# Patient Record
Sex: MALE | Race: WHITE | NOT HISPANIC OR LATINO
[De-identification: names, ages, dates, MRNs, and addresses within clinical notes are randomized per-mention and may not be internally consistent; named-entity substitution may affect disease eponyms.]

---

## 2018-12-27 ENCOUNTER — OTHER (OUTPATIENT)
Age: 68
End: 2018-12-27

## 2018-12-27 PROBLEM — Z00.00 ENCOUNTER FOR PREVENTIVE HEALTH EXAMINATION: Status: ACTIVE | Noted: 2018-12-27

## 2019-01-16 ENCOUNTER — APPOINTMENT (OUTPATIENT)
Dept: PULMONOLOGY | Facility: CLINIC | Age: 69
End: 2019-01-16
Payer: COMMERCIAL

## 2019-01-16 ENCOUNTER — LABORATORY RESULT (OUTPATIENT)
Age: 69
End: 2019-01-16

## 2019-01-16 VITALS
BODY MASS INDEX: 19.32 KG/M2 | HEIGHT: 71 IN | WEIGHT: 138 LBS | DIASTOLIC BLOOD PRESSURE: 90 MMHG | SYSTOLIC BLOOD PRESSURE: 140 MMHG | OXYGEN SATURATION: 96 % | HEART RATE: 94 BPM

## 2019-01-16 PROCEDURE — 99214 OFFICE O/P EST MOD 30 MIN: CPT | Mod: 25

## 2019-01-16 PROCEDURE — 94250: CPT

## 2019-01-16 PROCEDURE — 36415 COLL VENOUS BLD VENIPUNCTURE: CPT

## 2019-01-16 PROCEDURE — 88738 HGB QUANT TRANSCUTANEOUS: CPT

## 2019-01-16 PROCEDURE — 95012 NITRIC OXIDE EXP GAS DETER: CPT

## 2019-01-16 PROCEDURE — 94060 EVALUATION OF WHEEZING: CPT

## 2019-01-16 PROCEDURE — 99407 BEHAV CHNG SMOKING > 10 MIN: CPT | Mod: 25

## 2019-01-16 PROCEDURE — 94729 DIFFUSING CAPACITY: CPT

## 2019-01-16 PROCEDURE — 94727 GAS DIL/WSHOT DETER LNG VOL: CPT

## 2019-01-16 NOTE — PROCEDURE
[FreeTextEntry1] : Chest CT scan performed on January 7, 2019 showed a 2 cm x 1.5 cm x 1.1 cm irregular spiculated mass anteriorly within the left lower lobe. This is considered suspicious for neoplasia. Further evaluation with PET/CT scan is recommended. Severe emphysematous changes most extensive within the upper lung fields. Bullous changes at the lung apices. Atherosclerotic calcification within the thoracic aorta and coronary arteries.\par \par Pulmonary function test performed in my office today: Lung volume was within normal limits; spirometry showed severe obstructive airway disease with some improvement postbronchodilator; diffusion showed very severe impairment. SpO2 at rest on room air is 96%.\par \par Monoxide level was found to be elevated at 20 PPM.\par \par Exhaled nitric oxide level is 7 ppb.

## 2019-01-16 NOTE — PHYSICAL EXAM
[General Appearance - Well Developed] : well developed [Normal Appearance] : normal appearance [Well Groomed] : well groomed [General Appearance - Well Nourished] : well nourished [No Deformities] : no deformities [General Appearance - In No Acute Distress] : no acute distress [Heart Rate And Rhythm] : heart rate and rhythm were normal [Heart Sounds] : normal S1 and S2 [Murmurs] : no murmurs present [Respiration, Rhythm And Depth] : normal respiratory rhythm and effort [Exaggerated Use Of Accessory Muscles For Inspiration] : no accessory muscle use [Abdomen Soft] : soft [Abdomen Tenderness] : non-tender [Abdomen Mass (___ Cm)] : no abdominal mass palpated [Nail Clubbing] : no clubbing of the fingernails [Cyanosis, Localized] : no localized cyanosis [Petechial Hemorrhages (___cm)] : no petechial hemorrhages [] : no ischemic changes [FreeTextEntry1] : Decreased breath sounds bilaterally

## 2019-01-16 NOTE — DISCUSSION/SUMMARY
[FreeTextEntry1] : Chilo is a patient with a newly discovered left lower lobe spiculated lung nodule, concerning for lung cancer in this active smoker. Secondly, he is a patient with COPD from history of cigarette smoking. Thirdly, his vision when atherosclerotic calcifications in the coronary arteries, rule out CAD

## 2019-01-16 NOTE — REASON FOR VISIT
[Follow-Up] : a follow-up visit [Abnormal CT Scan] : abnormal CT Scan [COPD] : COPD [Shortness of Breath] : shortness of Breath

## 2019-01-16 NOTE — ASSESSMENT
[FreeTextEntry1] : 15 minutes were spent in smoking cessation counseling.  I advised Chilo to continue to use Trelegy for history of COPD. I discussed with him at length regarding the aforementioned chest CT scan findings, I will arrange for PET/CT scan for further evaluation. I collected serum Quantiferon, ACE, etc. for further evaluation. I advised him to seek cardiology evaluation for atherosclerotic changes in coronary arteries. I advised to return to my office for follow visit after the PET scan has been performed

## 2019-01-19 LAB
ACE BLD-CCNC: 62 U/L
ANA SER IF-ACNC: NEGATIVE
APTT BLD: 33.8 SEC
BASOPHILS # BLD AUTO: 0.03 K/UL
BASOPHILS NFR BLD AUTO: 0.4 %
CK SERPL-CCNC: 95 U/L
CRP SERPL-MCNC: 0.13 MG/DL
ENA JO1 AB SER IA-ACNC: <0.2 AL
ENA SCL70 IGG SER IA-ACNC: <0.2 AL
EOSINOPHIL # BLD AUTO: 0.11 K/UL
EOSINOPHIL NFR BLD AUTO: 1.5 %
ERYTHROCYTE [SEDIMENTATION RATE] IN BLOOD BY WESTERGREN METHOD: 27 MM/HR
HCT VFR BLD CALC: 42.9 %
HGB BLD-MCNC: 13.9 G/DL
HIV1+2 AB SPEC QL IA.RAPID: NONREACTIVE
IMM GRANULOCYTES NFR BLD AUTO: 0.1 %
INR PPP: 1.28 RATIO
LYMPHOCYTES # BLD AUTO: 1.96 K/UL
LYMPHOCYTES NFR BLD AUTO: 27.6 %
MAN DIFF?: NORMAL
MCHC RBC-ENTMCNC: 31.2 PG
MCHC RBC-ENTMCNC: 32.4 GM/DL
MCV RBC AUTO: 96.2 FL
MONOCYTES # BLD AUTO: 0.63 K/UL
MONOCYTES NFR BLD AUTO: 8.9 %
MPO AB + PR3 PNL SER: NORMAL
NEUTROPHILS # BLD AUTO: 4.37 K/UL
NEUTROPHILS NFR BLD AUTO: 61.5 %
PLATELET # BLD AUTO: 302 K/UL
PT BLD: 14.5 SEC
RBC # BLD: 4.46 M/UL
RBC # FLD: 13.3 %
RHEUMATOID FACT SER QL: 11 IU/ML
WBC # FLD AUTO: 7.11 K/UL

## 2019-01-21 LAB
M TB IFN-G BLD-IMP: NEGATIVE
QUANTIFERON TB PLUS MITOGEN MINUS NIL: 7.77 IU/ML
QUANTIFERON TB PLUS NIL: 0.02 IU/ML
QUANTIFERON TB PLUS TB1 MINUS NIL: 0 IU/ML
QUANTIFERON TB PLUS TB2 MINUS NIL: 0 IU/ML

## 2019-01-27 LAB — HP PNL SER: NORMAL

## 2019-01-30 ENCOUNTER — APPOINTMENT (OUTPATIENT)
Dept: PULMONOLOGY | Facility: CLINIC | Age: 69
End: 2019-01-30

## 2019-07-09 ENCOUNTER — APPOINTMENT (OUTPATIENT)
Dept: OTHER | Facility: CLINIC | Age: 69
End: 2019-07-09
Payer: COMMERCIAL

## 2019-07-09 VITALS
HEART RATE: 72 BPM | SYSTOLIC BLOOD PRESSURE: 143 MMHG | BODY MASS INDEX: 20.02 KG/M2 | HEIGHT: 71 IN | DIASTOLIC BLOOD PRESSURE: 91 MMHG | WEIGHT: 143 LBS | OXYGEN SATURATION: 97 % | RESPIRATION RATE: 18 BRPM

## 2019-07-09 DIAGNOSIS — Z03.89 ENCOUNTER FOR OBSERVATION FOR OTHER SUSPECTED DISEASES AND CONDITIONS RULED OUT: ICD-10-CM

## 2019-07-09 DIAGNOSIS — F17.200 NICOTINE DEPENDENCE, UNSPECIFIED, UNCOMPLICATED: ICD-10-CM

## 2019-07-09 PROCEDURE — 99386 PREV VISIT NEW AGE 40-64: CPT | Mod: 25

## 2019-07-09 PROCEDURE — 94060 EVALUATION OF WHEEZING: CPT

## 2019-07-10 LAB
ALBUMIN SERPL ELPH-MCNC: 4.2 G/DL
ALP BLD-CCNC: 86 U/L
ALT SERPL-CCNC: 8 U/L
ANION GAP SERPL CALC-SCNC: 10 MMOL/L
APPEARANCE: CLEAR
AST SERPL-CCNC: 15 U/L
BACTERIA: NEGATIVE
BASOPHILS # BLD AUTO: 0.05 K/UL
BASOPHILS NFR BLD AUTO: 0.7 %
BILIRUB SERPL-MCNC: 0.5 MG/DL
BILIRUBIN URINE: NEGATIVE
BLOOD URINE: NEGATIVE
BUN SERPL-MCNC: 13 MG/DL
CALCIUM SERPL-MCNC: 9.7 MG/DL
CHLORIDE SERPL-SCNC: 102 MMOL/L
CHOLEST SERPL-MCNC: 184 MG/DL
CHOLEST/HDLC SERPL: 4.8 RATIO
CO2 SERPL-SCNC: 25 MMOL/L
COLOR: YELLOW
CREAT SERPL-MCNC: 0.99 MG/DL
EOSINOPHIL # BLD AUTO: 0.06 K/UL
EOSINOPHIL NFR BLD AUTO: 0.8 %
GLUCOSE QUALITATIVE U: NEGATIVE
GLUCOSE SERPL-MCNC: 97 MG/DL
HCT VFR BLD CALC: 48.3 %
HDLC SERPL-MCNC: 38 MG/DL
HGB BLD-MCNC: 15 G/DL
HYALINE CASTS: 0 /LPF
IMM GRANULOCYTES NFR BLD AUTO: 0.7 %
KETONES URINE: NEGATIVE
LDLC SERPL CALC-MCNC: 124 MG/DL
LEUKOCYTE ESTERASE URINE: NEGATIVE
LYMPHOCYTES # BLD AUTO: 1.79 K/UL
LYMPHOCYTES NFR BLD AUTO: 24.7 %
MAN DIFF?: NORMAL
MCHC RBC-ENTMCNC: 30.1 PG
MCHC RBC-ENTMCNC: 31.1 GM/DL
MCV RBC AUTO: 96.8 FL
MICROSCOPIC-UA: NORMAL
MONOCYTES # BLD AUTO: 0.75 K/UL
MONOCYTES NFR BLD AUTO: 10.3 %
NEUTROPHILS # BLD AUTO: 4.56 K/UL
NEUTROPHILS NFR BLD AUTO: 62.8 %
NITRITE URINE: NEGATIVE
PH URINE: 7.5
PLATELET # BLD AUTO: 249 K/UL
POTASSIUM SERPL-SCNC: 5.7 MMOL/L
PROT SERPL-MCNC: 7 G/DL
PROTEIN URINE: NEGATIVE
RBC # BLD: 4.99 M/UL
RBC # FLD: 13.1 %
RED BLOOD CELLS URINE: 3 /HPF
SODIUM SERPL-SCNC: 137 MMOL/L
SPECIFIC GRAVITY URINE: 1.01
SQUAMOUS EPITHELIAL CELLS: 0 /HPF
TRIGL SERPL-MCNC: 108 MG/DL
UROBILINOGEN URINE: NORMAL
WBC # FLD AUTO: 7.26 K/UL
WHITE BLOOD CELLS URINE: 0 /HPF

## 2019-09-19 ENCOUNTER — APPOINTMENT (OUTPATIENT)
Dept: PULMONOLOGY | Facility: CLINIC | Age: 69
End: 2019-09-19

## 2019-09-24 ENCOUNTER — APPOINTMENT (OUTPATIENT)
Dept: PULMONOLOGY | Facility: CLINIC | Age: 69
End: 2019-09-24

## 2019-10-08 ENCOUNTER — APPOINTMENT (OUTPATIENT)
Dept: DERMATOLOGY | Facility: CLINIC | Age: 69
End: 2019-10-08

## 2019-12-13 ENCOUNTER — APPOINTMENT (OUTPATIENT)
Dept: GASTROENTEROLOGY | Facility: CLINIC | Age: 69
End: 2019-12-13

## 2020-02-21 ENCOUNTER — APPOINTMENT (OUTPATIENT)
Dept: PULMONOLOGY | Facility: CLINIC | Age: 70
End: 2020-02-21
Payer: COMMERCIAL

## 2020-02-21 ENCOUNTER — MED ADMIN CHARGE (OUTPATIENT)
Age: 70
End: 2020-02-21

## 2020-02-21 VITALS — BODY MASS INDEX: 19.61 KG/M2 | OXYGEN SATURATION: 96 % | HEIGHT: 70 IN | WEIGHT: 137 LBS | HEART RATE: 74 BPM

## 2020-02-21 VITALS
BODY MASS INDEX: 19.61 KG/M2 | HEART RATE: 66 BPM | WEIGHT: 137 LBS | RESPIRATION RATE: 15 BRPM | SYSTOLIC BLOOD PRESSURE: 150 MMHG | HEIGHT: 70 IN | TEMPERATURE: 97.9 F | DIASTOLIC BLOOD PRESSURE: 100 MMHG

## 2020-02-21 DIAGNOSIS — R06.2 WHEEZING: ICD-10-CM

## 2020-02-21 PROCEDURE — 90732 PPSV23 VACC 2 YRS+ SUBQ/IM: CPT

## 2020-02-21 PROCEDURE — 99204 OFFICE O/P NEW MOD 45 MIN: CPT | Mod: 25

## 2020-02-21 PROCEDURE — 94060 EVALUATION OF WHEEZING: CPT

## 2020-02-21 PROCEDURE — 94726 PLETHYSMOGRAPHY LUNG VOLUMES: CPT

## 2020-02-21 PROCEDURE — 94729 DIFFUSING CAPACITY: CPT

## 2020-02-21 PROCEDURE — ZZZZZ: CPT

## 2020-02-21 PROCEDURE — G0009: CPT

## 2020-02-21 PROCEDURE — G0296 VISIT TO DETERM LDCT ELIG: CPT

## 2020-02-21 NOTE — PHYSICAL EXAM
[Normal Oropharynx] : normal oropharynx [No Acute Distress] : no acute distress [Normal Appearance] : normal appearance [No Neck Mass] : no neck mass [Normal Rate/Rhythm] : normal rate/rhythm [No Murmurs] : no murmurs [No Resp Distress] : no resp distress [Normal S1, S2] : normal s1, s2 [Clear to Auscultation Bilaterally] : clear to auscultation bilaterally [Benign] : benign [No Abnormalities] : no abnormalities [Normal Gait] : normal gait [No Clubbing] : no clubbing [No Cyanosis] : no cyanosis [No Edema] : no edema [FROM] : FROM [Normal Color/ Pigmentation] : normal color/ pigmentation [No Focal Deficits] : no focal deficits [Oriented x3] : oriented x3 [Normal Affect] : normal affect

## 2020-02-27 NOTE — END OF VISIT
[FreeTextEntry3] : I directly supervised nurse practitioner Popeye Shafer and was present during key points of his history and physical. I agree with his history, physical and assessment.\par

## 2020-02-27 NOTE — ASSESSMENT
[FreeTextEntry1] : 1. COPD: Severe obstruction based upon PFT. Will start patient on Trelegy as maintenance medication for COPD. Patient educated and demonstration provided on administration. return to office in 6 months.\par \par 2. Lung mass: s/p resection, according to patient benign. Will repeat CT as it has been 1 year since surgery without further imaging. Will initiate lung cancer screening after discussion of risks/benefits with patient.\par \par 3. Former smoker: No longer smoking. Advised to contact office if any difficulty in maintaining nonsmoker status.\par \par 4. Health Maintenance: Discussed flu and pneumonia vaccinations with patient, patient has never had either. However, he agrees to have PNA vaccination today.

## 2020-02-27 NOTE — HISTORY OF PRESENT ILLNESS
[>= 30 pack years] : >= 30 pack years [Former] : former [Never] : never [TextBox_4] : 68yo M with PMH COPD/emphysema (dx soon after 9/11), lung mass (benign, s/p resection in 2/2019). \par \par Dyspnea, cough, wheeze started around 2008. Dyspnea on exertion after 1/2 block walk or going up stairs. Denies childhood asthma. Smoked about 45 years, 1.5 PPD maximum. Quit in February of 2019, using Chantix. Denies vaping. 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 [TextBox_11] : 1.5 [TextBox_13] : 47 [TextBox_15] : 2019

## 2020-03-11 ENCOUNTER — APPOINTMENT (OUTPATIENT)
Dept: DERMATOLOGY | Facility: CLINIC | Age: 70
End: 2020-03-11
Payer: COMMERCIAL

## 2020-03-11 ENCOUNTER — LABORATORY RESULT (OUTPATIENT)
Age: 70
End: 2020-03-11

## 2020-03-11 VITALS — HEIGHT: 70 IN | BODY MASS INDEX: 20.04 KG/M2 | WEIGHT: 140 LBS

## 2020-03-11 DIAGNOSIS — L57.0 ACTINIC KERATOSIS: ICD-10-CM

## 2020-03-11 DIAGNOSIS — D22.9 MELANOCYTIC NEVI, UNSPECIFIED: ICD-10-CM

## 2020-03-11 DIAGNOSIS — Z12.83 ENCOUNTER FOR SCREENING FOR MALIGNANT NEOPLASM OF SKIN: ICD-10-CM

## 2020-03-11 PROCEDURE — 17004 DESTROY PREMAL LESIONS 15/>: CPT | Mod: 59

## 2020-03-11 PROCEDURE — 99203 OFFICE O/P NEW LOW 30 MIN: CPT | Mod: 25

## 2020-03-11 PROCEDURE — 11102 TANGNTL BX SKIN SINGLE LES: CPT | Mod: 59

## 2020-04-24 ENCOUNTER — APPOINTMENT (OUTPATIENT)
Dept: GASTROENTEROLOGY | Facility: CLINIC | Age: 70
End: 2020-04-24

## 2020-05-13 VITALS — BODY MASS INDEX: 20.04 KG/M2 | HEIGHT: 70 IN | WEIGHT: 140 LBS

## 2020-05-13 DIAGNOSIS — Z12.2 ENCOUNTER FOR SCREENING FOR MALIGNANT NEOPLASM OF RESPIRATORY ORGANS: ICD-10-CM

## 2020-05-13 NOTE — REASON FOR VISIT
[Review of Eligibility] : review of eligibility [Virtual Visit] : virtual visit [Initial Evaluation] : an initial evaluation visit

## 2020-05-16 ENCOUNTER — OUTPATIENT (OUTPATIENT)
Dept: OUTPATIENT SERVICES | Facility: HOSPITAL | Age: 70
LOS: 1 days | End: 2020-05-16

## 2020-05-16 DIAGNOSIS — R91.1 SOLITARY PULMONARY NODULE: ICD-10-CM

## 2020-06-09 ENCOUNTER — APPOINTMENT (OUTPATIENT)
Dept: OTHER | Facility: CLINIC | Age: 70
End: 2020-06-09
Payer: COMMERCIAL

## 2020-06-09 ENCOUNTER — FORM ENCOUNTER (OUTPATIENT)
Age: 70
End: 2020-06-09

## 2020-06-09 PROCEDURE — 99442: CPT | Mod: 95

## 2020-06-09 PROCEDURE — 99396 PREV VISIT EST AGE 40-64: CPT | Mod: 95

## 2020-06-09 RX ORDER — FLUTICASONE FUROATE, UMECLIDINIUM BROMIDE AND VILANTEROL TRIFENATATE 100; 62.5; 25 UG/1; UG/1; UG/1
100-62.5-25 POWDER RESPIRATORY (INHALATION) DAILY
Qty: 3 | Refills: 2 | Status: COMPLETED | COMMUNITY
Start: 2019-01-16 | End: 2020-06-09

## 2020-08-12 NOTE — PLAN
[Age appropriate screenings] : Age appropriate screenings [Regular Exercise] : regular exercise [Regular follow-up with healthcare provider] : regular follow-up with healthcare provider [FreeTextEntry1] : His LDCT is scheduled for 8/15/20 at the [facility] location.  His eligibility is confirmed.

## 2020-08-12 NOTE — HISTORY OF PRESENT ILLNESS
[Former] : former smoker [_____ pack-years] : [unfilled] pack-years [TextBox_13] : He denies hemoptysis, denies new cough, denies unexplained weight loss.\par He is a former smoker with a 102 pack year smoking history (2PPD x 51 years), he quit 1.5 years ago after having lung surgery for what turned out to be a benign nodule.  He has no family h/o lung cancer.  Worked at the [employer] on [employer] for approx 4.5 months. He is referred by Dr. Suleman Pritchard. \par

## 2020-08-15 ENCOUNTER — APPOINTMENT (OUTPATIENT)
Dept: CT IMAGING | Facility: IMAGING CENTER | Age: 70
End: 2020-08-15
Payer: COMMERCIAL

## 2020-08-15 ENCOUNTER — OUTPATIENT (OUTPATIENT)
Dept: OUTPATIENT SERVICES | Facility: HOSPITAL | Age: 70
LOS: 1 days | End: 2020-08-15
Payer: COMMERCIAL

## 2020-08-15 DIAGNOSIS — R91.1 SOLITARY PULMONARY NODULE: ICD-10-CM

## 2020-08-15 DIAGNOSIS — Z00.8 ENCOUNTER FOR OTHER GENERAL EXAMINATION: ICD-10-CM

## 2020-08-15 PROCEDURE — G0297: CPT

## 2020-08-15 PROCEDURE — G0297: CPT | Mod: 26

## 2020-08-18 ENCOUNTER — APPOINTMENT (OUTPATIENT)
Dept: PULMONOLOGY | Facility: CLINIC | Age: 70
End: 2020-08-18
Payer: COMMERCIAL

## 2020-08-18 VITALS
SYSTOLIC BLOOD PRESSURE: 154 MMHG | WEIGHT: 140 LBS | DIASTOLIC BLOOD PRESSURE: 107 MMHG | HEIGHT: 70 IN | BODY MASS INDEX: 20.04 KG/M2 | TEMPERATURE: 97.8 F | HEART RATE: 88 BPM | RESPIRATION RATE: 17 BRPM | OXYGEN SATURATION: 96 %

## 2020-08-18 PROCEDURE — 99214 OFFICE O/P EST MOD 30 MIN: CPT

## 2020-08-18 NOTE — PHYSICAL EXAM
[No Acute Distress] : no acute distress [Normal Oropharynx] : normal oropharynx [Normal Appearance] : normal appearance [No Neck Mass] : no neck mass [Normal Rate/Rhythm] : normal rate/rhythm [Normal S1, S2] : normal s1, s2 [No Resp Distress] : no resp distress [No Murmurs] : no murmurs [Clear to Auscultation Bilaterally] : clear to auscultation bilaterally [No Abnormalities] : no abnormalities [Normal Gait] : normal gait [Benign] : benign [No Cyanosis] : no cyanosis [No Clubbing] : no clubbing [No Edema] : no edema [FROM] : FROM [Normal Color/ Pigmentation] : normal color/ pigmentation [No Focal Deficits] : no focal deficits [Oriented x3] : oriented x3 [Normal Affect] : normal affect

## 2020-08-18 NOTE — HISTORY OF PRESENT ILLNESS
[Former] : former [>= 30 pack years] : >= 30 pack years [Never] : never [TextBox_4] : 68yo M with PMH COPD/emphysema (dx soon after 9/11), lung mass (benign, s/p resection in 2/2019). \par \par ~~~~~\par CT Chest 8/15/2020 IMPRESSION: Right upper lobe 3.7 x 3.8 cm mass with interval increase in size since January 7, 2019 worrisome for neoplasm possibly a primary lung neoplasm. \par \par Multiple bilateral smaller nodular opacities are new since January 7, 2019 may represent metastatic disease. \par \par Enlarged anterior mediastinal lymph node worrisome for metastatic disease. \par \par Lung-RADS 4X. \par ~~~~~\par \par Dyspnea, cough, wheeze started around 2008. Dyspnea on exertion after 1/2 block walk or going up stairs. Denies childhood asthma. Smoked about 45 years, 1.5 PPD maximum. Quit in February of 2019, using Chantix. Denies vaping. 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 Continues to work as a .\par \par Today presents with stable symptoms. Uses Trelegy sporadically, feels it helps when he uses it. Still not smoking. No worsening of symptoms, denies recent weight loss and has in fact gained 2 pounds since 1/2019. [TextBox_11] : 1.5 [TextBox_13] : 45

## 2020-08-18 NOTE — REASON FOR VISIT
[Follow-Up] : a follow-up visit [Abnormal CXR/ Chest CT] : an abnormal CXR/ chest CT [COPD] : COPD [Pulmonary Nodules] : pulmonary nodules

## 2020-08-18 NOTE — ASSESSMENT
[FreeTextEntry1] : 1. Multiple pulmonary nodules: Will arrange for PET/CT ASAP, referral to Dr. Bárbara Valera for FNA following PET results. F/u in office in 2 weeks to discuss results and next steps.\par \par 2. COPD: Continue Trelegy, encouraged daily use.\par \par I, Popeye Shafer NP, am scribing for and in the presence of Dr. Miguel A Pritchard, the following sections HISTORY OF PRESENT ILLNESS, PAST MEDICAL/FAMILY/SOCIAL HISTORY; REVIEW OF SYSTEMS; VITAL SIGNS; PHYSICAL EXAM; DISPOSITION.

## 2020-08-19 ENCOUNTER — FORM ENCOUNTER (OUTPATIENT)
Age: 70
End: 2020-08-19

## 2020-08-21 ENCOUNTER — APPOINTMENT (OUTPATIENT)
Dept: GASTROENTEROLOGY | Facility: CLINIC | Age: 70
End: 2020-08-21

## 2020-08-24 ENCOUNTER — APPOINTMENT (OUTPATIENT)
Dept: PULMONOLOGY | Facility: CLINIC | Age: 70
End: 2020-08-24

## 2020-08-25 ENCOUNTER — RESULT REVIEW (OUTPATIENT)
Age: 70
End: 2020-08-25

## 2020-08-25 ENCOUNTER — APPOINTMENT (OUTPATIENT)
Dept: NUCLEAR MEDICINE | Facility: IMAGING CENTER | Age: 70
End: 2020-08-25

## 2020-08-25 ENCOUNTER — OUTPATIENT (OUTPATIENT)
Dept: OUTPATIENT SERVICES | Facility: HOSPITAL | Age: 70
LOS: 1 days | End: 2020-08-25
Payer: COMMERCIAL

## 2020-08-25 DIAGNOSIS — R91.1 SOLITARY PULMONARY NODULE: ICD-10-CM

## 2020-08-25 PROCEDURE — A9552: CPT

## 2020-08-25 PROCEDURE — 78815 PET IMAGE W/CT SKULL-THIGH: CPT | Mod: 26,PI

## 2020-08-25 PROCEDURE — 78815 PET IMAGE W/CT SKULL-THIGH: CPT

## 2020-09-02 ENCOUNTER — APPOINTMENT (OUTPATIENT)
Dept: PULMONOLOGY | Facility: CLINIC | Age: 70
End: 2020-09-02
Payer: COMMERCIAL

## 2020-09-02 VITALS
SYSTOLIC BLOOD PRESSURE: 160 MMHG | OXYGEN SATURATION: 98 % | TEMPERATURE: 97.8 F | HEART RATE: 82 BPM | WEIGHT: 138 LBS | BODY MASS INDEX: 19.76 KG/M2 | HEIGHT: 70 IN | DIASTOLIC BLOOD PRESSURE: 91 MMHG | RESPIRATION RATE: 16 BRPM

## 2020-09-02 PROCEDURE — 99214 OFFICE O/P EST MOD 30 MIN: CPT | Mod: 25

## 2020-09-02 PROCEDURE — 36415 COLL VENOUS BLD VENIPUNCTURE: CPT

## 2020-09-02 NOTE — HISTORY OF PRESENT ILLNESS
[Former] : former [>= 30 pack years] : >= 30 pack years [Never] : never [TextBox_4] : 70yo M with PMH COPD/emphysema (dx soon after 9/11), lung mass (benign, s/p resection in 2/2019). \par \par ~~~~~\par PET/CT 8/25/2020 IMPRESSION: \par Since outside PET/CT 1/30/2019: \par 1. Considerable interval increased size and FDG avidity of a now spiculated right upper lobe mass with central necrosis suspicious for primary lung malignancy. \par 2. New FDG avid enlarged mediastinal lymph nodes and bilateral pulmonary nodules probably metastatic disease. \par 3. Indeterminate FDG avid left intraparotid focus, not well evaluated secondary to misregistration artifact, however appears new since prior PET/CT, possibly metastatic. Consider further evaluation with ultrasound.\par ~~~~~\par \par Patient with biopsy pending with Dr. Barraza at [facility]. He is still doing well from a symptomatic standpoint. Denies fevers, chills, dyspnea, chest pain/tightness, cough, wheeze. Continues to work on a fulltime basis as a . [TextBox_11] : 1.5 [TextBox_13] : 47

## 2020-09-02 NOTE — ASSESSMENT
[FreeTextEntry1] : 1. Multiple pulmonary nodules: Will f/u and ensure biopsy scheduled asap. Referred to Dr. Ceja from oncology.\par \par 2. COPD: Continue Trelegy, daily use recommended.\par \par 3. HTN: Will start on Losartan. Check CMP for kidney function, potassium level.\par \par 4. Health Maintenance: Recommended influenza vaccine. Patient has never had and declines at this time.\par \par I, Popeye Shafer NP, am scribing for and in the presence of Dr. Miguel A Pritchard, the following sections HISTORY OF PRESENT ILLNESS, PAST MEDICAL/FAMILY/SOCIAL HISTORY; REVIEW OF SYSTEMS; VITAL SIGNS; PHYSICAL EXAM; DISPOSITION.

## 2020-09-02 NOTE — PHYSICAL EXAM
[No Acute Distress] : no acute distress [Normal Oropharynx] : normal oropharynx [Normal Appearance] : normal appearance [No Neck Mass] : no neck mass [Normal Rate/Rhythm] : normal rate/rhythm [Normal S1, S2] : normal s1, s2 [No Murmurs] : no murmurs [No Resp Distress] : no resp distress [Clear to Auscultation Bilaterally] : clear to auscultation bilaterally [No Abnormalities] : no abnormalities [Benign] : benign [Normal Gait] : normal gait [No Clubbing] : no clubbing [No Cyanosis] : no cyanosis [No Edema] : no edema [FROM] : FROM [No Focal Deficits] : no focal deficits [Normal Color/ Pigmentation] : normal color/ pigmentation [Oriented x3] : oriented x3 [Normal Affect] : normal affect

## 2020-09-03 LAB
BASOPHILS # BLD AUTO: 0.06 K/UL
BASOPHILS NFR BLD AUTO: 0.9 %
EOSINOPHIL # BLD AUTO: 0.16 K/UL
EOSINOPHIL NFR BLD AUTO: 2.5 %
HCT VFR BLD CALC: 48.8 %
HGB BLD-MCNC: 14.6 G/DL
IMM GRANULOCYTES NFR BLD AUTO: 0.2 %
LYMPHOCYTES # BLD AUTO: 1.46 K/UL
LYMPHOCYTES NFR BLD AUTO: 23 %
MAN DIFF?: NORMAL
MCHC RBC-ENTMCNC: 29.9 GM/DL
MCHC RBC-ENTMCNC: 30.6 PG
MCV RBC AUTO: 102.3 FL
MONOCYTES # BLD AUTO: 0.48 K/UL
MONOCYTES NFR BLD AUTO: 7.6 %
NEUTROPHILS # BLD AUTO: 4.18 K/UL
NEUTROPHILS NFR BLD AUTO: 65.8 %
PLATELET # BLD AUTO: 271 K/UL
RBC # BLD: 4.77 M/UL
RBC # FLD: 14.2 %
WBC # FLD AUTO: 6.35 K/UL

## 2020-09-04 LAB
ALBUMIN SERPL ELPH-MCNC: 4.2 G/DL
ALP BLD-CCNC: 82 U/L
ALT SERPL-CCNC: 7 U/L
ANION GAP SERPL CALC-SCNC: 11 MMOL/L
AST SERPL-CCNC: 17 U/L
BILIRUB SERPL-MCNC: 0.4 MG/DL
BUN SERPL-MCNC: 14 MG/DL
CALCIUM SERPL-MCNC: 9.7 MG/DL
CHLORIDE SERPL-SCNC: 104 MMOL/L
CO2 SERPL-SCNC: 24 MMOL/L
CREAT SERPL-MCNC: 0.96 MG/DL
GLUCOSE SERPL-MCNC: 90 MG/DL
INR PPP: 1.11 RATIO
POTASSIUM SERPL-SCNC: 5.2 MMOL/L
PROT SERPL-MCNC: 7 G/DL
PT BLD: 13.1 SEC
SODIUM SERPL-SCNC: 139 MMOL/L

## 2020-09-21 ENCOUNTER — OUTPATIENT (OUTPATIENT)
Dept: OUTPATIENT SERVICES | Facility: HOSPITAL | Age: 70
LOS: 1 days | End: 2020-09-21
Payer: COMMERCIAL

## 2020-09-21 DIAGNOSIS — Z11.59 ENCOUNTER FOR SCREENING FOR OTHER VIRAL DISEASES: ICD-10-CM

## 2020-09-21 LAB — SARS-COV-2 RNA SPEC QL NAA+PROBE: SIGNIFICANT CHANGE UP

## 2020-09-21 PROCEDURE — U0003: CPT

## 2020-09-24 ENCOUNTER — RESULT REVIEW (OUTPATIENT)
Age: 70
End: 2020-09-24

## 2020-09-24 ENCOUNTER — APPOINTMENT (OUTPATIENT)
Dept: CT IMAGING | Facility: HOSPITAL | Age: 70
End: 2020-09-24

## 2020-09-24 ENCOUNTER — OUTPATIENT (OUTPATIENT)
Dept: OUTPATIENT SERVICES | Facility: HOSPITAL | Age: 70
LOS: 1 days | End: 2020-09-24
Payer: COMMERCIAL

## 2020-09-24 DIAGNOSIS — R91.1 SOLITARY PULMONARY NODULE: ICD-10-CM

## 2020-09-24 PROCEDURE — 88305 TISSUE EXAM BY PATHOLOGIST: CPT | Mod: 26

## 2020-09-24 PROCEDURE — 88172 CYTP DX EVAL FNA 1ST EA SITE: CPT

## 2020-09-24 PROCEDURE — 88173 CYTOPATH EVAL FNA REPORT: CPT

## 2020-09-24 PROCEDURE — 77012 CT SCAN FOR NEEDLE BIOPSY: CPT | Mod: 26

## 2020-09-24 PROCEDURE — 32405: CPT

## 2020-09-24 PROCEDURE — 88173 CYTOPATH EVAL FNA REPORT: CPT | Mod: 26

## 2020-09-24 PROCEDURE — 77012 CT SCAN FOR NEEDLE BIOPSY: CPT

## 2020-09-24 PROCEDURE — 88305 TISSUE EXAM BY PATHOLOGIST: CPT

## 2020-09-30 ENCOUNTER — FORM ENCOUNTER (OUTPATIENT)
Age: 70
End: 2020-09-30

## 2020-10-03 DIAGNOSIS — Z01.818 ENCOUNTER FOR OTHER PREPROCEDURAL EXAMINATION: ICD-10-CM

## 2020-10-04 ENCOUNTER — APPOINTMENT (OUTPATIENT)
Dept: DISASTER EMERGENCY | Facility: CLINIC | Age: 70
End: 2020-10-04

## 2020-10-04 LAB — SARS-COV-2 N GENE NPH QL NAA+PROBE: NOT DETECTED

## 2020-10-05 ENCOUNTER — OUTPATIENT (OUTPATIENT)
Dept: OUTPATIENT SERVICES | Facility: HOSPITAL | Age: 70
LOS: 1 days | End: 2020-10-05
Payer: COMMERCIAL

## 2020-10-05 VITALS
OXYGEN SATURATION: 96 % | DIASTOLIC BLOOD PRESSURE: 92 MMHG | HEART RATE: 98 BPM | HEIGHT: 69 IN | TEMPERATURE: 98 F | RESPIRATION RATE: 15 BRPM | WEIGHT: 139.99 LBS | SYSTOLIC BLOOD PRESSURE: 148 MMHG

## 2020-10-05 DIAGNOSIS — Z98.890 OTHER SPECIFIED POSTPROCEDURAL STATES: Chronic | ICD-10-CM

## 2020-10-05 DIAGNOSIS — R91.1 SOLITARY PULMONARY NODULE: ICD-10-CM

## 2020-10-05 PROCEDURE — 93010 ELECTROCARDIOGRAM REPORT: CPT

## 2020-10-05 RX ORDER — SODIUM CHLORIDE 9 MG/ML
3 INJECTION INTRAMUSCULAR; INTRAVENOUS; SUBCUTANEOUS EVERY 8 HOURS
Refills: 0 | Status: DISCONTINUED | OUTPATIENT
Start: 2020-10-07 | End: 2020-10-21

## 2020-10-05 RX ORDER — SODIUM CHLORIDE 9 MG/ML
1000 INJECTION, SOLUTION INTRAVENOUS
Refills: 0 | Status: DISCONTINUED | OUTPATIENT
Start: 2020-10-07 | End: 2020-10-21

## 2020-10-05 NOTE — H&P PST ADULT - NSICDXPROBLEM_GEN_ALL_CORE_FT
PROBLEM DIAGNOSES  Problem: Solitary pulmonary nodule  Assessment and Plan: Scheduled for Endobronchial Ulktrasound bronchoscopy with cytology on 10/02/2020. Pre op instructions, famotidine given and explained. Pt verbalized unbderstanding.   Pt had Covid-19 swab done on 10/04/2020

## 2020-10-05 NOTE — H&P PST ADULT - HISTORY OF PRESENT ILLNESS
71 y/o  male with PMHx of HTN presents to PST 69 y/o  male with PMHx of HTN presents to PST for pre op evaluation with hx of solitary pulmonary nodule. Now scheduled for endobronchial ultrasound bronchoscopy with cytology on 10/07/2020

## 2020-10-05 NOTE — H&P PST ADULT - NEGATIVE CARDIOVASCULAR SYMPTOMS
no peripheral edema/no claudication/no orthopnea/no palpitations/no dyspnea on exertion/no paroxysmal nocturnal dyspnea/no chest pain

## 2020-10-05 NOTE — H&P PST ADULT - NEGATIVE OPHTHALMOLOGIC SYMPTOMS
General
no lacrimation L/no lacrimation R/no blurred vision L/no discharge L/no pain R/no irritation L/no irritation R/no diplopia/no photophobia/no blurred vision R/no discharge R/no loss of vision L/no loss of vision R/no pain L

## 2020-10-07 ENCOUNTER — RESULT REVIEW (OUTPATIENT)
Age: 70
End: 2020-10-07

## 2020-10-07 ENCOUNTER — OUTPATIENT (OUTPATIENT)
Dept: OUTPATIENT SERVICES | Facility: HOSPITAL | Age: 70
LOS: 1 days | Discharge: HOME | End: 2020-10-07
Payer: COMMERCIAL

## 2020-10-07 ENCOUNTER — APPOINTMENT (OUTPATIENT)
Dept: PULMONOLOGY | Facility: HOSPITAL | Age: 70
End: 2020-10-07

## 2020-10-07 VITALS
WEIGHT: 139.99 LBS | DIASTOLIC BLOOD PRESSURE: 79 MMHG | RESPIRATION RATE: 16 BRPM | HEART RATE: 74 BPM | SYSTOLIC BLOOD PRESSURE: 128 MMHG | HEIGHT: 69 IN | TEMPERATURE: 98 F | OXYGEN SATURATION: 97 %

## 2020-10-07 VITALS
SYSTOLIC BLOOD PRESSURE: 142 MMHG | HEART RATE: 69 BPM | RESPIRATION RATE: 16 BRPM | OXYGEN SATURATION: 95 % | DIASTOLIC BLOOD PRESSURE: 73 MMHG

## 2020-10-07 DIAGNOSIS — Z98.890 OTHER SPECIFIED POSTPROCEDURAL STATES: Chronic | ICD-10-CM

## 2020-10-07 DIAGNOSIS — R91.1 SOLITARY PULMONARY NODULE: ICD-10-CM

## 2020-10-07 PROCEDURE — 88342 IMHCHEM/IMCYTCHM 1ST ANTB: CPT | Mod: 26,59

## 2020-10-07 PROCEDURE — 88305 TISSUE EXAM BY PATHOLOGIST: CPT | Mod: 26

## 2020-10-07 PROCEDURE — 88342 IMHCHEM/IMCYTCHM 1ST ANTB: CPT | Mod: 26

## 2020-10-07 PROCEDURE — 31653 BRONCH EBUS SAMPLNG 3/> NODE: CPT | Mod: GC

## 2020-10-07 PROCEDURE — 88341 IMHCHEM/IMCYTCHM EA ADD ANTB: CPT | Mod: 26

## 2020-10-07 PROCEDURE — 88173 CYTOPATH EVAL FNA REPORT: CPT | Mod: 26

## 2020-10-07 RX ORDER — IPRATROPIUM/ALBUTEROL SULFATE 18-103MCG
3 AEROSOL WITH ADAPTER (GRAM) INHALATION ONCE
Refills: 0 | Status: COMPLETED | OUTPATIENT
Start: 2020-10-07 | End: 2020-10-07

## 2020-10-07 RX ORDER — ACETAMINOPHEN 500 MG
1000 TABLET ORAL ONCE
Refills: 0 | Status: DISCONTINUED | OUTPATIENT
Start: 2020-10-07 | End: 2020-10-21

## 2020-10-07 RX ORDER — FENTANYL CITRATE 50 UG/ML
25 INJECTION INTRAVENOUS
Refills: 0 | Status: DISCONTINUED | OUTPATIENT
Start: 2020-10-07 | End: 2020-10-07

## 2020-10-07 RX ORDER — ONDANSETRON 8 MG/1
4 TABLET, FILM COATED ORAL ONCE
Refills: 0 | Status: DISCONTINUED | OUTPATIENT
Start: 2020-10-07 | End: 2020-10-21

## 2020-10-07 RX ADMIN — SODIUM CHLORIDE 30 MILLILITER(S): 9 INJECTION, SOLUTION INTRAVENOUS at 07:02

## 2020-10-07 RX ADMIN — Medication 3 MILLILITER(S): at 09:52

## 2020-10-07 NOTE — ASU DISCHARGE PLAN (ADULT/PEDIATRIC) - CALL YOUR DOCTOR IF YOU HAVE ANY OF THE FOLLOWING:
Nausea and vomiting that does not stop/Pain not relieved by Medications/Fever greater than (need to indicate Fahrenheit or Celsius)/Bleeding that does not stop Inability to tolerate liquids or foods/Fever greater than (need to indicate Fahrenheit or Celsius)/Nausea and vomiting that does not stop/Pain not relieved by Medications/Bleeding that does not stop/Increased irritability or sluggishness

## 2020-10-07 NOTE — ASU DISCHARGE PLAN (ADULT/PEDIATRIC) - CARE PROVIDER_API CALL
Miguel A Pritchard  CRITICAL CARE MEDICINE  [address] [place] [address], [facility] [address]  [place], [place] [id number]  Phone: [phone]  Fax: [phone]  Follow Up Time:

## 2020-10-07 NOTE — ASU DISCHARGE PLAN (ADULT/PEDIATRIC) - NURSING INSTRUCTIONS
anesthesia recovery & what to do for pain, nausea, bleeding & any sign of infection @ home Cool and warm liquids that are not irritating to the throat should be given for the first day or two. Avoid hot liquids. Advance at your own pace starting with soft foods and advancing to a regular diet as tolerated

## 2020-10-09 LAB — NON-GYNECOLOGICAL CYTOLOGY STUDY: SIGNIFICANT CHANGE UP

## 2020-10-11 ENCOUNTER — FORM ENCOUNTER (OUTPATIENT)
Age: 70
End: 2020-10-11

## 2020-10-12 PROBLEM — R91.1 SOLITARY PULMONARY NODULE: Chronic | Status: ACTIVE | Noted: 2020-10-05

## 2020-10-12 PROBLEM — I10 ESSENTIAL (PRIMARY) HYPERTENSION: Chronic | Status: ACTIVE | Noted: 2020-10-05

## 2020-10-13 ENCOUNTER — OUTPATIENT (OUTPATIENT)
Dept: OUTPATIENT SERVICES | Facility: HOSPITAL | Age: 70
LOS: 1 days | Discharge: HOME | End: 2020-10-13

## 2020-10-13 DIAGNOSIS — Z98.890 OTHER SPECIFIED POSTPROCEDURAL STATES: Chronic | ICD-10-CM

## 2020-10-13 DIAGNOSIS — C46.50 KAPOSI'S SARCOMA OF UNSPECIFIED LUNG: ICD-10-CM

## 2020-10-15 ENCOUNTER — RESULT REVIEW (OUTPATIENT)
Age: 70
End: 2020-10-15

## 2020-10-15 ENCOUNTER — APPOINTMENT (OUTPATIENT)
Dept: HEMATOLOGY ONCOLOGY | Facility: CLINIC | Age: 70
End: 2020-10-15
Payer: COMMERCIAL

## 2020-10-15 ENCOUNTER — NON-APPOINTMENT (OUTPATIENT)
Age: 70
End: 2020-10-15

## 2020-10-15 VITALS
HEIGHT: 70 IN | DIASTOLIC BLOOD PRESSURE: 89 MMHG | OXYGEN SATURATION: 99 % | RESPIRATION RATE: 17 BRPM | TEMPERATURE: 97.7 F | BODY MASS INDEX: 19.69 KG/M2 | WEIGHT: 137.57 LBS | SYSTOLIC BLOOD PRESSURE: 145 MMHG | HEART RATE: 84 BPM

## 2020-10-15 DIAGNOSIS — Z82.5 FAMILY HISTORY OF ASTHMA AND OTHER CHRONIC LOWER RESPIRATORY DISEASES: ICD-10-CM

## 2020-10-15 DIAGNOSIS — Z87.891 PERSONAL HISTORY OF NICOTINE DEPENDENCE: ICD-10-CM

## 2020-10-15 LAB
ALBUMIN SERPL ELPH-MCNC: 4.4 G/DL
ALP BLD-CCNC: 104 U/L
ALT SERPL-CCNC: 9 U/L
ANION GAP SERPL CALC-SCNC: 14 MMOL/L
AST SERPL-CCNC: 18 U/L
BASOPHILS # BLD AUTO: 0.05 K/UL — SIGNIFICANT CHANGE UP (ref 0–0.2)
BASOPHILS NFR BLD AUTO: 0.6 % — SIGNIFICANT CHANGE UP (ref 0–2)
BILIRUB SERPL-MCNC: 0.5 MG/DL
BUN SERPL-MCNC: 16 MG/DL
CALCIUM SERPL-MCNC: 9.3 MG/DL
CEA SERPL-MCNC: 2.3 NG/ML
CHLORIDE SERPL-SCNC: 100 MMOL/L
CO2 SERPL-SCNC: 22 MMOL/L
CREAT SERPL-MCNC: 0.77 MG/DL
EOSINOPHIL # BLD AUTO: 0.12 K/UL — SIGNIFICANT CHANGE UP (ref 0–0.5)
EOSINOPHIL NFR BLD AUTO: 1.5 % — SIGNIFICANT CHANGE UP (ref 0–6)
GLUCOSE SERPL-MCNC: 89 MG/DL
HCT VFR BLD CALC: 45.2 % — SIGNIFICANT CHANGE UP (ref 39–50)
HGB BLD-MCNC: 14.7 G/DL — SIGNIFICANT CHANGE UP (ref 13–17)
IMM GRANULOCYTES NFR BLD AUTO: 0.5 % — SIGNIFICANT CHANGE UP (ref 0–1.5)
LYMPHOCYTES # BLD AUTO: 1.78 K/UL — SIGNIFICANT CHANGE UP (ref 1–3.3)
LYMPHOCYTES # BLD AUTO: 21.8 % — SIGNIFICANT CHANGE UP (ref 13–44)
MAGNESIUM SERPL-MCNC: 2.1 MG/DL
MCHC RBC-ENTMCNC: 30.8 PG — SIGNIFICANT CHANGE UP (ref 27–34)
MCHC RBC-ENTMCNC: 32.5 G/DL — SIGNIFICANT CHANGE UP (ref 32–36)
MCV RBC AUTO: 94.8 FL — SIGNIFICANT CHANGE UP (ref 80–100)
MONOCYTES # BLD AUTO: 0.78 K/UL — SIGNIFICANT CHANGE UP (ref 0–0.9)
MONOCYTES NFR BLD AUTO: 9.5 % — SIGNIFICANT CHANGE UP (ref 2–14)
NEUTROPHILS # BLD AUTO: 5.41 K/UL — SIGNIFICANT CHANGE UP (ref 1.8–7.4)
NEUTROPHILS NFR BLD AUTO: 66.1 % — SIGNIFICANT CHANGE UP (ref 43–77)
NRBC # BLD: 0 /100 WBCS — SIGNIFICANT CHANGE UP (ref 0–0)
PLATELET # BLD AUTO: 294 K/UL — SIGNIFICANT CHANGE UP (ref 150–400)
POTASSIUM SERPL-SCNC: 5.1 MMOL/L
PROT SERPL-MCNC: 7.4 G/DL
RBC # BLD: 4.77 M/UL — SIGNIFICANT CHANGE UP (ref 4.2–5.8)
RBC # FLD: 12.5 % — SIGNIFICANT CHANGE UP (ref 10.3–14.5)
SODIUM SERPL-SCNC: 135 MMOL/L
TSH SERPL-ACNC: 1.66 UIU/ML
WBC # BLD: 8.18 K/UL — SIGNIFICANT CHANGE UP (ref 3.8–10.5)
WBC # FLD AUTO: 8.18 K/UL — SIGNIFICANT CHANGE UP (ref 3.8–10.5)

## 2020-10-15 PROCEDURE — 99205 OFFICE O/P NEW HI 60 MIN: CPT

## 2020-10-15 NOTE — ASSESSMENT
[FreeTextEntry1] : 69 yo with newly diagnosed NSCLC, at least stage IIIB, maybe stage IV if contralateral nodules are malignant\par Will present at TB\par Brain MRI\par Will get new scans\par Will get labs including Guardant liquid NGS\par Will await PDL1 and NGS on tumor tissue\par Reviewed and discussed all images and bx results with pt and his wife\par Will refer for IR guided bx of LLL nodule- if this is pos, stage IV would be confirmed and tx would be systemic and palliative\par Discussed overview of tx\par Pt may seek additional opinions. \par Will see him on tele-med visit in 10 days.\par \par

## 2020-10-15 NOTE — CONSULT LETTER
[Dear  ___] : Dear  [unfilled], [Please see my note below.] : Please see my note below. [Consult Letter:] : I had the pleasure of evaluating your patient, [unfilled]. [Consult Closing:] : Thank you very much for allowing me to participate in the care of this patient.  If you have any questions, please do not hesitate to contact me. [Sincerely,] : Sincerely, [FreeTextEntry2] : Dr. Pritchard [FreeTextEntry3] : Dayne Bell MD\par \par

## 2020-10-15 NOTE — PHYSICAL EXAM
[Restricted in physically strenuous activity but ambulatory and able to carry out work of a light or sedentary nature] : Status 1- Restricted in physically strenuous activity but ambulatory and able to carry out work of a light or sedentary nature, e.g., light house work, office work [Thin] : thin [Normal] : grossly intact [de-identified] : distant BS

## 2020-10-15 NOTE — PHYSICAL EXAM
[Restricted in physically strenuous activity but ambulatory and able to carry out work of a light or sedentary nature] : Status 1- Restricted in physically strenuous activity but ambulatory and able to carry out work of a light or sedentary nature, e.g., light house work, office work [Thin] : thin [Normal] : grossly intact [de-identified] : distant BS

## 2020-10-15 NOTE — HISTORY OF PRESENT ILLNESS
[Disease: _____________________] : Disease: [unfilled] [de-identified] : adeno ca [de-identified] : 70 yo M with tobacco smoking hx and 911 exposure ( being followed at [facility],  was diagnosed with COPD/emphysema (dx soon after 9/11), had screening CT in 2019 which showed a lung mass. Work up at [employer]- solitary mass which was resected (benign-organizing PNA). He was referred to pulm grant a year ago by [facility] program but he made an appointment this year with Dr. Pritchard. He referred him for screening CT in Feb which was done in August and showed right upper lobe 3.7 x 3.8 cm mass with interval increase in size since January 7, 2019 worrisome for neoplasm possibly a primary lung neoplasm. Multiple bilateral smaller nodular opacities are new since January 7, 2019 may represent metastatic disease. Enlarged anterior mediastinal lymph node worrisome for metastatic disease. PET/CT 8/25/2020 IMPRESSION: Since outside PET/CT 1/30/2019: Considerable interval increased size and FDG avidity of a now spiculated right upper lobe mass with central necrosis suspicious for primary lung malignancy. New FDG avid enlarged mediastinal lymph nodes and bilateral pulmonary nodules probably metastatic disease. Indeterminate FDG avid left intraparotid focus, not well evaluated secondary to misregistration artifact, however appears new since prior PET/CT, possibly metastatic. He underwent CT guided bx by Dr. Barraza at [facility] which was suggestive of malignancy. He subsequently underwent bronch and EBUS/bx of level 4 LN which was c/w adeno ca, lung primary. He is still doing well from a symptomatic standpoint. He has lost some weight which he usually does every Summer. He denies fevers, chills, dyspnea, chest pain/tightness, cough, wheeze. Continues to work on a full-time basis as a . \par

## 2020-10-15 NOTE — HISTORY OF PRESENT ILLNESS
[Disease: _____________________] : Disease: [unfilled] [de-identified] : 70 yo M with tobacco smoking hx and 911 exposure ( being [facility],  was diagnosed with COPD/emphysema (dx soon after 9/11), had screening CT in 2019 which showed a lung mass. Work up at [employer] [facility]- solitary mass which was resected (benign-organizing PNA). He was referred to pulm grant a year ago by [facility] program but he made an appointment this year with Dr. Pritchard. He referred him for screening CT in Feb which was done in August and showed right upper lobe 3.7 x 3.8 cm mass with interval increase in size since January 7, 2019 worrisome for neoplasm possibly a primary lung neoplasm. Multiple bilateral smaller nodular opacities are new since January 7, 2019 may represent metastatic disease. Enlarged anterior mediastinal lymph node worrisome for metastatic disease. PET/CT 8/25/2020 IMPRESSION: Since outside PET/CT 1/30/2019: Considerable interval increased size and FDG avidity of a now spiculated right upper lobe mass with central necrosis suspicious for primary lung malignancy. New FDG avid enlarged mediastinal lymph nodes and bilateral pulmonary nodules probably metastatic disease. Indeterminate FDG avid left intraparotid focus, not well evaluated secondary to misregistration artifact, however appears new since prior PET/CT, possibly metastatic. He underwent CT guided bx by Dr. Barraza at [facility] which was suggestive of malignancy. He subsequently underwent bronch and EBUS/bx of level 4 LN which was c/w adeno ca, lung primary. He is still doing well from a symptomatic standpoint. He has lost some weight which he usually does every Summer. He denies fevers, chills, dyspnea, chest pain/tightness, cough, wheeze. Continues to work on a full-time basis as a . \par  [de-identified] : adeno ca

## 2020-10-15 NOTE — REVIEW OF SYSTEMS
[Fatigue] : fatigue [Recent Change In Weight] : ~T recent weight change [SOB on Exertion] : shortness of breath during exertion [Negative] : Heme/Lymph

## 2020-10-15 NOTE — CONSULT LETTER
[Dear  ___] : Dear  [unfilled], [Please see my note below.] : Please see my note below. [Consult Letter:] : I had the pleasure of evaluating your patient, [unfilled]. [Sincerely,] : Sincerely, [Consult Closing:] : Thank you very much for allowing me to participate in the care of this patient.  If you have any questions, please do not hesitate to contact me. [FreeTextEntry2] : Dr. Pritchard [FreeTextEntry3] : Dayne Bell MD\par \par

## 2020-10-15 NOTE — REVIEW OF SYSTEMS
[Fatigue] : fatigue [Recent Change In Weight] : ~T recent weight change [SOB on Exertion] : shortness of breath during exertion [Negative] : Allergic/Immunologic

## 2020-10-20 ENCOUNTER — FORM ENCOUNTER (OUTPATIENT)
Age: 70
End: 2020-10-20

## 2020-10-21 LAB
HAV IGM SER QL: NONREACTIVE
HBV CORE IGM SER QL: NONREACTIVE
HBV SURFACE AG SER QL: NONREACTIVE
HCV AB SER QL: NONREACTIVE
HCV S/CO RATIO: 0.19 S/CO

## 2020-10-24 ENCOUNTER — APPOINTMENT (OUTPATIENT)
Dept: CT IMAGING | Facility: CLINIC | Age: 70
End: 2020-10-24
Payer: COMMERCIAL

## 2020-10-24 ENCOUNTER — OUTPATIENT (OUTPATIENT)
Dept: OUTPATIENT SERVICES | Facility: HOSPITAL | Age: 70
LOS: 1 days | End: 2020-10-24
Payer: COMMERCIAL

## 2020-10-24 ENCOUNTER — APPOINTMENT (OUTPATIENT)
Dept: DISASTER EMERGENCY | Facility: CLINIC | Age: 70
End: 2020-10-24

## 2020-10-24 DIAGNOSIS — C34.90 MALIGNANT NEOPLASM OF UNSPECIFIED PART OF UNSPECIFIED BRONCHUS OR LUNG: ICD-10-CM

## 2020-10-24 DIAGNOSIS — Z98.890 OTHER SPECIFIED POSTPROCEDURAL STATES: Chronic | ICD-10-CM

## 2020-10-24 PROCEDURE — 74160 CT ABDOMEN W/CONTRAST: CPT | Mod: 26

## 2020-10-24 PROCEDURE — 74160 CT ABDOMEN W/CONTRAST: CPT

## 2020-10-24 PROCEDURE — 71260 CT THORAX DX C+: CPT | Mod: 26

## 2020-10-24 PROCEDURE — 71260 CT THORAX DX C+: CPT

## 2020-10-25 ENCOUNTER — OUTPATIENT (OUTPATIENT)
Dept: OUTPATIENT SERVICES | Facility: HOSPITAL | Age: 70
LOS: 1 days | End: 2020-10-25
Payer: COMMERCIAL

## 2020-10-25 ENCOUNTER — APPOINTMENT (OUTPATIENT)
Dept: MRI IMAGING | Facility: CLINIC | Age: 70
End: 2020-10-25
Payer: COMMERCIAL

## 2020-10-25 DIAGNOSIS — Z00.8 ENCOUNTER FOR OTHER GENERAL EXAMINATION: ICD-10-CM

## 2020-10-25 DIAGNOSIS — Z98.890 OTHER SPECIFIED POSTPROCEDURAL STATES: Chronic | ICD-10-CM

## 2020-10-25 PROCEDURE — A9585: CPT

## 2020-10-25 PROCEDURE — 70553 MRI BRAIN STEM W/O & W/DYE: CPT

## 2020-10-25 PROCEDURE — 70553 MRI BRAIN STEM W/O & W/DYE: CPT | Mod: 26

## 2020-10-26 ENCOUNTER — NON-APPOINTMENT (OUTPATIENT)
Age: 70
End: 2020-10-26

## 2020-10-27 ENCOUNTER — APPOINTMENT (OUTPATIENT)
Dept: INTERVENTIONAL RADIOLOGY/VASCULAR | Facility: CLINIC | Age: 70
End: 2020-10-27

## 2020-10-27 ENCOUNTER — APPOINTMENT (OUTPATIENT)
Dept: HEMATOLOGY ONCOLOGY | Facility: CLINIC | Age: 70
End: 2020-10-27

## 2020-10-27 ENCOUNTER — APPOINTMENT (OUTPATIENT)
Dept: HEMATOLOGY ONCOLOGY | Facility: CLINIC | Age: 70
End: 2020-10-27
Payer: COMMERCIAL

## 2020-10-27 PROCEDURE — 99215 OFFICE O/P EST HI 40 MIN: CPT | Mod: 95

## 2020-10-27 RX ORDER — METOCLOPRAMIDE 10 MG/1
10 TABLET ORAL EVERY 6 HOURS
Qty: 40 | Refills: 2 | Status: ACTIVE | COMMUNITY
Start: 2020-10-27 | End: 1900-01-01

## 2020-10-30 DIAGNOSIS — Z01.818 ENCOUNTER FOR OTHER PREPROCEDURAL EXAMINATION: ICD-10-CM

## 2020-10-30 NOTE — ASSESSMENT
[FreeTextEntry1] : 69 yo with newly diagnosed NSCLC, at least stage IIIB, maybe stage IV if contralateral nodules are malignant\par Reviewed brain MRI and CT scans- overall POD with clear progression of contralateral nodules confirming stage IV disease. MRI brain and CT abd reveal no mets\par Discussed at TB- no role for bx of contralateral nodule- PET/CT and recent CT scans are convincing for metastatic involvement of left lung. \par PDL1 was 80% but tumor NGS could not be performed because of QNS\par Blood NGS through [facility] revealed- HRAS and TP53 mutations. \par Reviewed and discussed all images and NGS results with pt and his wife\par recommended starting with carbo/alimta/pembro as front line option. \par Start folic acid \par B12 with cycle 1\par regimen, potential AEs discussed in detail. \par Will see pt prior to infusion cycle 1\par  [Palliative] : Goals of care discussed with patient: Palliative

## 2020-10-30 NOTE — PHYSICAL EXAM
[Restricted in physically strenuous activity but ambulatory and able to carry out work of a light or sedentary nature] : Status 1- Restricted in physically strenuous activity but ambulatory and able to carry out work of a light or sedentary nature, e.g., light house work, office work [Thin] : thin [Normal] : affect appropriate [de-identified] : breathing comfortably

## 2020-10-30 NOTE — CONSULT LETTER
[Dear  ___] : Dear  [unfilled], [Consult Letter:] : I had the pleasure of evaluating your patient, [unfilled]. [Please see my note below.] : Please see my note below. [Consult Closing:] : Thank you very much for allowing me to participate in the care of this patient.  If you have any questions, please do not hesitate to contact me. [Sincerely,] : Sincerely, [FreeTextEntry2] : Dr. Pritchard [FreeTextEntry3] : Dayne Bell MD\par \par

## 2020-10-30 NOTE — HISTORY OF PRESENT ILLNESS
[Disease: _____________________] : Disease: [unfilled] [Home] : at home, [unfilled] , at the time of the visit. [Spouse] : spouse [Verbal consent obtained from patient] : the patient, [unfilled] [FreeTextEntry4] : Nisreen Españawright [M: ___] : M[unfilled] [AJCC Stage: ____] : AJCC Stage: [unfilled] [de-identified] : 71 yo M with tobacco smoking hx and 911 exposure ( being followed [facility],  was diagnosed with COPD/emphysema (dx soon after 9/11), had screening CT in 2019 which showed a lung mass. Work up at [facility]- solitary mass which was resected (benign-organizing PNA). He was referred to pulm grant a year ago by [facility] program but he made an appointment this year with Dr. Pritchard. He referred him for screening CT in Feb which was done in August and showed right upper lobe 3.7 x 3.8 cm mass with interval increase in size since January 7, 2019 worrisome for neoplasm possibly a primary lung neoplasm. Multiple bilateral smaller nodular opacities are new since January 7, 2019 may represent metastatic disease. Enlarged anterior mediastinal lymph node worrisome for metastatic disease. PET/CT 8/25/2020 IMPRESSION: Since outside PET/CT 1/30/2019: Considerable interval increased size and FDG avidity of a now spiculated right upper lobe mass with central necrosis suspicious for primary lung malignancy. New FDG avid enlarged mediastinal lymph nodes and bilateral pulmonary nodules probably metastatic disease. Indeterminate FDG avid left intraparotid focus, not well evaluated secondary to misregistration artifact, however appears new since prior PET/CT, possibly metastatic. He underwent CT guided bx by Dr. Barraza at [facility] which was suggestive of malignancy. He subsequently underwent bronch and EBUS/bx of level 4 LN which was c/w adeno ca, lung primary. He is still doing well from a symptomatic standpoint. He has lost some weight which he usually does every Summer. He denies fevers, chills, dyspnea, chest pain/tightness, cough, wheeze. Continues to work on a full-time basis as a . \par 10/27/20: pt has no new complaints. He had Ct scans and MRi of brain interval enlargement of multiple bilateral pulmonary masses and mediastinal adenopathy consistent with progressive disease. CT abdomen and MRI brain revealed no mets.  [de-identified] : adeno ca [de-identified] : PDL1 80%

## 2020-11-01 ENCOUNTER — APPOINTMENT (OUTPATIENT)
Dept: DISASTER EMERGENCY | Facility: CLINIC | Age: 70
End: 2020-11-01

## 2020-11-01 LAB — SARS-COV-2 N GENE NPH QL NAA+PROBE: NOT DETECTED

## 2020-11-03 ENCOUNTER — FORM ENCOUNTER (OUTPATIENT)
Age: 70
End: 2020-11-03

## 2020-11-06 ENCOUNTER — APPOINTMENT (OUTPATIENT)
Dept: INFUSION THERAPY | Facility: HOSPITAL | Age: 70
End: 2020-11-06

## 2020-11-06 ENCOUNTER — LABORATORY RESULT (OUTPATIENT)
Age: 70
End: 2020-11-06

## 2020-11-06 ENCOUNTER — APPOINTMENT (OUTPATIENT)
Dept: HEMATOLOGY ONCOLOGY | Facility: CLINIC | Age: 70
End: 2020-11-06
Payer: COMMERCIAL

## 2020-11-06 ENCOUNTER — RESULT REVIEW (OUTPATIENT)
Age: 70
End: 2020-11-06

## 2020-11-06 VITALS
WEIGHT: 139.77 LBS | RESPIRATION RATE: 16 BRPM | HEART RATE: 86 BPM | DIASTOLIC BLOOD PRESSURE: 82 MMHG | OXYGEN SATURATION: 98 % | BODY MASS INDEX: 20.06 KG/M2 | SYSTOLIC BLOOD PRESSURE: 142 MMHG | TEMPERATURE: 97.3 F

## 2020-11-06 PROBLEM — D22.9 MELANOCYTIC NEVI, UNSPECIFIED: Chronic | Status: ACTIVE | Noted: 2020-11-02

## 2020-11-06 PROBLEM — L57.0 ACTINIC KERATOSIS: Chronic | Status: ACTIVE | Noted: 2020-11-02

## 2020-11-06 PROBLEM — C34.90 MALIGNANT NEOPLASM OF UNSPECIFIED PART OF UNSPECIFIED BRONCHUS OR LUNG: Chronic | Status: ACTIVE | Noted: 2020-11-02

## 2020-11-06 PROBLEM — C80.1 MALIGNANT (PRIMARY) NEOPLASM, UNSPECIFIED: Chronic | Status: ACTIVE | Noted: 2020-11-02

## 2020-11-06 PROBLEM — J44.9 CHRONIC OBSTRUCTIVE PULMONARY DISEASE, UNSPECIFIED: Chronic | Status: ACTIVE | Noted: 2020-11-02

## 2020-11-06 PROBLEM — L81.4 OTHER MELANIN HYPERPIGMENTATION: Chronic | Status: ACTIVE | Noted: 2020-11-02

## 2020-11-06 LAB
BASOPHILS # BLD AUTO: 0.06 K/UL — SIGNIFICANT CHANGE UP (ref 0–0.2)
BASOPHILS NFR BLD AUTO: 0.7 % — SIGNIFICANT CHANGE UP (ref 0–2)
EOSINOPHIL # BLD AUTO: 0.16 K/UL — SIGNIFICANT CHANGE UP (ref 0–0.5)
EOSINOPHIL NFR BLD AUTO: 1.9 % — SIGNIFICANT CHANGE UP (ref 0–6)
HCT VFR BLD CALC: 42 % — SIGNIFICANT CHANGE UP (ref 39–50)
HGB BLD-MCNC: 14.2 G/DL — SIGNIFICANT CHANGE UP (ref 13–17)
IMM GRANULOCYTES NFR BLD AUTO: 1.2 % — SIGNIFICANT CHANGE UP (ref 0–1.5)
LYMPHOCYTES # BLD AUTO: 1.77 K/UL — SIGNIFICANT CHANGE UP (ref 1–3.3)
LYMPHOCYTES # BLD AUTO: 20.8 % — SIGNIFICANT CHANGE UP (ref 13–44)
MCHC RBC-ENTMCNC: 30.7 PG — SIGNIFICANT CHANGE UP (ref 27–34)
MCHC RBC-ENTMCNC: 33.8 G/DL — SIGNIFICANT CHANGE UP (ref 32–36)
MCV RBC AUTO: 90.7 FL — SIGNIFICANT CHANGE UP (ref 80–100)
MONOCYTES # BLD AUTO: 0.67 K/UL — SIGNIFICANT CHANGE UP (ref 0–0.9)
MONOCYTES NFR BLD AUTO: 7.9 % — SIGNIFICANT CHANGE UP (ref 2–14)
NEUTROPHILS # BLD AUTO: 5.75 K/UL — SIGNIFICANT CHANGE UP (ref 1.8–7.4)
NEUTROPHILS NFR BLD AUTO: 67.5 % — SIGNIFICANT CHANGE UP (ref 43–77)
NRBC # BLD: 0 /100 WBCS — SIGNIFICANT CHANGE UP (ref 0–0)
PLATELET # BLD AUTO: 303 K/UL — SIGNIFICANT CHANGE UP (ref 150–400)
RBC # BLD: 4.63 M/UL — SIGNIFICANT CHANGE UP (ref 4.2–5.8)
RBC # FLD: 12.1 % — SIGNIFICANT CHANGE UP (ref 10.3–14.5)
WBC # BLD: 8.51 K/UL — SIGNIFICANT CHANGE UP (ref 3.8–10.5)
WBC # FLD AUTO: 8.51 K/UL — SIGNIFICANT CHANGE UP (ref 3.8–10.5)

## 2020-11-06 PROCEDURE — 99214 OFFICE O/P EST MOD 30 MIN: CPT

## 2020-11-06 RX ORDER — PROCHLORPERAZINE MALEATE 10 MG/1
10 TABLET ORAL
Qty: 90 | Refills: 3 | Status: ACTIVE | COMMUNITY
Start: 2020-11-06 | End: 1900-01-01

## 2020-11-06 NOTE — ASSESSMENT
[Palliative] : Goals of care discussed with patient: Palliative [FreeTextEntry1] : 71 yo with newly diagnosed NSCLC, at least stage IIIB, maybe stage IV if contralateral nodules are malignant\par Reviewed brain MRI and CT scans- overall POD with clear progression of contralateral nodules confirming stage IV disease. MRI brain and CT abd reveal no mets\par Discussed at TB- no role for bx of contralateral nodule- PET/CT and recent CT scans are convincing for metastatic involvement of left lung. \par PDL1 was 80% but tumor NGS could not be performed because of QNS\par Blood NGS through [facility] revealed- HRAS and TP53 mutations. \par Pt starting with carbo/alimta/pembro as front line option today.\par Start folic acid. Pt states unaware about rx sent. Will start tonight\par B12 today and with every other treatment \par regimen, potential AEs discussed in detail. \par Pt in agreement and consent was signed\par Compazine also sent to pharmacy\par OV 3 weeks on same day as C#2\par

## 2020-11-06 NOTE — PHYSICAL EXAM
[Restricted in physically strenuous activity but ambulatory and able to carry out work of a light or sedentary nature] : Status 1- Restricted in physically strenuous activity but ambulatory and able to carry out work of a light or sedentary nature, e.g., light house work, office work [Thin] : thin [Normal] : affect appropriate [de-identified] : breathing comfortably

## 2020-11-06 NOTE — HISTORY OF PRESENT ILLNESS
[Disease: _____________________] : Disease: [unfilled] [M: ___] : M[unfilled] [AJCC Stage: ____] : AJCC Stage: [unfilled] [de-identified] : 69 yo M with tobacco smoking hx and 911 exposure ( being [facility],  was diagnosed with COPD/emphysema (dx soon after 9/11), had screening CT in 2019 which showed a lung mass. Work up at [facility]- solitary mass which was resected (benign-organizing PNA). He was referred to pulm grant a year ago by [facility] program but he made an appointment this year with Dr. Pritchard. He referred him for screening CT in Feb which was done in August and showed right upper lobe 3.7 x 3.8 cm mass with interval increase in size since January 7, 2019 worrisome for neoplasm possibly a primary lung neoplasm. Multiple bilateral smaller nodular opacities are new since January 7, 2019 may represent metastatic disease. Enlarged anterior mediastinal lymph node worrisome for metastatic disease. PET/CT 8/25/2020 IMPRESSION: Since outside PET/CT 1/30/2019: Considerable interval increased size and FDG avidity of a now spiculated right upper lobe mass with central necrosis suspicious for primary lung malignancy. New FDG avid enlarged mediastinal lymph nodes and bilateral pulmonary nodules probably metastatic disease. Indeterminate FDG avid left intraparotid focus, not well evaluated secondary to misregistration artifact, however appears new since prior PET/CT, possibly metastatic. He underwent CT guided bx by Dr. Barraza at [facility] which was suggestive of malignancy. He subsequently underwent bronch and EBUS/bx of level 4 LN which was c/w adeno ca, lung primary. He is still doing well from a symptomatic standpoint. He has lost some weight which he usually does every Summer. He denies fevers, chills, dyspnea, chest pain/tightness, cough, wheeze. Continues to work on a full-time basis as a . \par 10/27/20: pt has no new complaints. He had Ct scans and MRi of brain interval enlargement of multiple bilateral pulmonary masses and mediastinal adenopathy consistent with progressive disease. CT abdomen and MRI brain revealed no mets. \par \par 11/6/2020: Pt presents prior to starting treatment today to review regimen, address any concerns and provide written consent. He offers no new complaints.  [de-identified] : adeno ca [de-identified] : PDL1 80%

## 2020-11-07 ENCOUNTER — TRANSCRIPTION ENCOUNTER (OUTPATIENT)
Age: 70
End: 2020-11-07

## 2020-11-07 DIAGNOSIS — R11.2 NAUSEA WITH VOMITING, UNSPECIFIED: ICD-10-CM

## 2020-11-07 DIAGNOSIS — Z51.11 ENCOUNTER FOR ANTINEOPLASTIC CHEMOTHERAPY: ICD-10-CM

## 2020-11-09 ENCOUNTER — NON-APPOINTMENT (OUTPATIENT)
Age: 70
End: 2020-11-09

## 2020-11-10 ENCOUNTER — NON-APPOINTMENT (OUTPATIENT)
Age: 70
End: 2020-11-10

## 2020-11-10 ENCOUNTER — FORM ENCOUNTER (OUTPATIENT)
Age: 70
End: 2020-11-10

## 2020-11-11 ENCOUNTER — NON-APPOINTMENT (OUTPATIENT)
Age: 70
End: 2020-11-11

## 2020-11-20 ENCOUNTER — OUTPATIENT (OUTPATIENT)
Dept: OUTPATIENT SERVICES | Facility: HOSPITAL | Age: 70
LOS: 1 days | Discharge: HOME | End: 2020-11-20

## 2020-11-20 DIAGNOSIS — Z98.890 OTHER SPECIFIED POSTPROCEDURAL STATES: Chronic | ICD-10-CM

## 2020-11-20 DIAGNOSIS — C46.51 KAPOSI'S SARCOMA OF RIGHT LUNG: ICD-10-CM

## 2020-11-27 ENCOUNTER — APPOINTMENT (OUTPATIENT)
Dept: HEMATOLOGY ONCOLOGY | Facility: CLINIC | Age: 70
End: 2020-11-27
Payer: COMMERCIAL

## 2020-11-27 ENCOUNTER — RESULT REVIEW (OUTPATIENT)
Age: 70
End: 2020-11-27

## 2020-11-27 ENCOUNTER — LABORATORY RESULT (OUTPATIENT)
Age: 70
End: 2020-11-27

## 2020-11-27 ENCOUNTER — APPOINTMENT (OUTPATIENT)
Dept: INFUSION THERAPY | Facility: HOSPITAL | Age: 70
End: 2020-11-27

## 2020-11-27 VITALS
HEIGHT: 70 IN | TEMPERATURE: 97.2 F | HEART RATE: 100 BPM | RESPIRATION RATE: 16 BRPM | WEIGHT: 136.69 LBS | SYSTOLIC BLOOD PRESSURE: 133 MMHG | OXYGEN SATURATION: 96 % | BODY MASS INDEX: 19.57 KG/M2 | DIASTOLIC BLOOD PRESSURE: 80 MMHG

## 2020-11-27 LAB
BASOPHILS # BLD AUTO: 0.04 K/UL — SIGNIFICANT CHANGE UP (ref 0–0.2)
BASOPHILS NFR BLD AUTO: 0.6 % — SIGNIFICANT CHANGE UP (ref 0–2)
EOSINOPHIL # BLD AUTO: 0.03 K/UL — SIGNIFICANT CHANGE UP (ref 0–0.5)
EOSINOPHIL NFR BLD AUTO: 0.4 % — SIGNIFICANT CHANGE UP (ref 0–6)
HCT VFR BLD CALC: 37 % — LOW (ref 39–50)
HGB BLD-MCNC: 12.4 G/DL — LOW (ref 13–17)
IMM GRANULOCYTES NFR BLD AUTO: 1.4 % — SIGNIFICANT CHANGE UP (ref 0–1.5)
LYMPHOCYTES # BLD AUTO: 1.5 K/UL — SIGNIFICANT CHANGE UP (ref 1–3.3)
LYMPHOCYTES # BLD AUTO: 20.7 % — SIGNIFICANT CHANGE UP (ref 13–44)
MCHC RBC-ENTMCNC: 30.6 PG — SIGNIFICANT CHANGE UP (ref 27–34)
MCHC RBC-ENTMCNC: 33.5 G/DL — SIGNIFICANT CHANGE UP (ref 32–36)
MCV RBC AUTO: 91.4 FL — SIGNIFICANT CHANGE UP (ref 80–100)
MONOCYTES # BLD AUTO: 0.92 K/UL — HIGH (ref 0–0.9)
MONOCYTES NFR BLD AUTO: 12.7 % — SIGNIFICANT CHANGE UP (ref 2–14)
NEUTROPHILS # BLD AUTO: 4.65 K/UL — SIGNIFICANT CHANGE UP (ref 1.8–7.4)
NEUTROPHILS NFR BLD AUTO: 64.2 % — SIGNIFICANT CHANGE UP (ref 43–77)
NRBC # BLD: 0 /100 WBCS — SIGNIFICANT CHANGE UP (ref 0–0)
PLATELET # BLD AUTO: 535 K/UL — HIGH (ref 150–400)
RBC # BLD: 4.05 M/UL — LOW (ref 4.2–5.8)
RBC # FLD: 12.5 % — SIGNIFICANT CHANGE UP (ref 10.3–14.5)
WBC # BLD: 7.24 K/UL — SIGNIFICANT CHANGE UP (ref 3.8–10.5)
WBC # FLD AUTO: 7.24 K/UL — SIGNIFICANT CHANGE UP (ref 3.8–10.5)

## 2020-11-27 PROCEDURE — 99214 OFFICE O/P EST MOD 30 MIN: CPT

## 2020-11-27 NOTE — PHYSICAL EXAM
[Restricted in physically strenuous activity but ambulatory and able to carry out work of a light or sedentary nature] : Status 1- Restricted in physically strenuous activity but ambulatory and able to carry out work of a light or sedentary nature, e.g., light house work, office work [Thin] : thin [Normal] : affect appropriate [de-identified] : breathing comfortably

## 2020-11-27 NOTE — HISTORY OF PRESENT ILLNESS
[Disease: _____________________] : Disease: [unfilled] [M: ___] : M[unfilled] [AJCC Stage: ____] : AJCC Stage: [unfilled] [de-identified] : 71 yo M with tobacco smoking hx and 911 exposure ( being followed at [facility],  was diagnosed with COPD/emphysema (dx soon after 9/11), had screening CT in 2019 which showed a lung mass. Work up at [facility]- solitary mass which was resected (benign-organizing PNA). He was referred to pulm grant a year ago by [facility] program but he made an appointment this year with Dr. Pritchard. He referred him for screening CT in Feb which was done in August and showed right upper lobe 3.7 x 3.8 cm mass with interval increase in size since January 7, 2019 worrisome for neoplasm possibly a primary lung neoplasm. Multiple bilateral smaller nodular opacities are new since January 7, 2019 may represent metastatic disease. Enlarged anterior mediastinal lymph node worrisome for metastatic disease. PET/CT 8/25/2020 IMPRESSION: Since outside PET/CT 1/30/2019: Considerable interval increased size and FDG avidity of a now spiculated right upper lobe mass with central necrosis suspicious for primary lung malignancy. New FDG avid enlarged mediastinal lymph nodes and bilateral pulmonary nodules probably metastatic disease. Indeterminate FDG avid left intraparotid focus, not well evaluated secondary to misregistration artifact, however appears new since prior PET/CT, possibly metastatic. He underwent CT guided bx by Dr. Barraza at [facility] which was suggestive of malignancy. He subsequently underwent bronch and EBUS/bx of level 4 LN which was c/w adeno ca, lung primary. He is still doing well from a symptomatic standpoint. He has lost some weight which he usually does every Summer. He denies fevers, chills, dyspnea, chest pain/tightness, cough, wheeze. Continues to work on a full-time basis as a . \par 10/27/20: pt has no new complaints. He had Ct scans and MRi of brain interval enlargement of multiple bilateral pulmonary masses and mediastinal adenopathy consistent with progressive disease. CT abdomen and MRI brain revealed no mets. \par \par 11/6/2020: Pt presents prior to starting treatment today to review regimen, address any concerns and provide written consent. He offers no new complaints. \par \par 11/27/2020: Pt returns for follow up. S/p first cycle. Tolerating well . NOT SMOKING!!!!! Doing well. Had one episode of near syncope (vaso-vagal). Pt states he was very dehydrated. No further episodes since. Pt states he "feels great" [de-identified] : adeno ca [de-identified] : PDL1 80%

## 2020-11-27 NOTE — REASON FOR VISIT
Next appt 10-  Last appt 08-    Refill request for  LANTUS SOLOSTAR 5X3 ML SOLUTION          Will file in chart as: LANTUS SOLOSTAR 100 UNIT/ML pen-injector         Sig: INJECT SUBCUTANEOUSLY 44 Â UNITS TWO TIMES DAILY       Last refilled info;  08-, #75ml, 1 Refill  Refill unable to be completed per standing protocol due to; This was just refilled on 08- and sent to [facility]. Please confirm pharmacy as this refill request was sent from [facility]  Orders pended, and routed to provider for approval.   Please route any notes back to your nursing pool via patient call NOT Rx Auth.   Thank you, Refill Center Staff [Follow-Up Visit] : a follow-up [Spouse] : spouse [FreeTextEntry2] : lung cancer

## 2020-11-27 NOTE — ASSESSMENT
[Palliative] : Goals of care discussed with patient: Palliative [FreeTextEntry1] : 71 yo with newly diagnosed NSCLC, at least stage IIIB, maybe stage IV if contralateral nodules are malignant\par Reviewed brain MRI and CT scans- overall POD with clear progression of contralateral nodules confirming stage IV disease. MRI brain and CT abd reveal no mets\par Discussed at TB- no role for bx of contralateral nodule- PET/CT and recent CT scans are convincing for metastatic involvement of left lung. \par PDL1 was 80% but tumor NGS could not be performed because of QNS\par Blood NGS through [facility] revealed- HRAS and TP53 mutations. \par Pt starting with carbo/alimta/pembro as front line option. S/P C1. C#2 today\par Continue folic acid. B12 every other treatment \par Reimage after C#3. Will enter orders next cycle.\par Reiterated necessity of adequate hydration. F/U bw today\par \par OV 3 weeks on same day as C#3\par

## 2020-11-30 DIAGNOSIS — R11.2 NAUSEA WITH VOMITING, UNSPECIFIED: ICD-10-CM

## 2020-11-30 DIAGNOSIS — E86.0 DEHYDRATION: ICD-10-CM

## 2020-11-30 DIAGNOSIS — Z51.11 ENCOUNTER FOR ANTINEOPLASTIC CHEMOTHERAPY: ICD-10-CM

## 2020-12-15 ENCOUNTER — LABORATORY RESULT (OUTPATIENT)
Age: 70
End: 2020-12-15

## 2020-12-15 ENCOUNTER — APPOINTMENT (OUTPATIENT)
Dept: DISASTER EMERGENCY | Facility: CLINIC | Age: 70
End: 2020-12-15

## 2020-12-18 ENCOUNTER — RESULT REVIEW (OUTPATIENT)
Age: 70
End: 2020-12-18

## 2020-12-18 ENCOUNTER — LABORATORY RESULT (OUTPATIENT)
Age: 70
End: 2020-12-18

## 2020-12-18 ENCOUNTER — APPOINTMENT (OUTPATIENT)
Dept: HEMATOLOGY ONCOLOGY | Facility: CLINIC | Age: 70
End: 2020-12-18
Payer: COMMERCIAL

## 2020-12-18 ENCOUNTER — APPOINTMENT (OUTPATIENT)
Dept: INFUSION THERAPY | Facility: HOSPITAL | Age: 70
End: 2020-12-18

## 2020-12-18 VITALS
BODY MASS INDEX: 19.57 KG/M2 | TEMPERATURE: 97.3 F | HEIGHT: 70.08 IN | WEIGHT: 136.66 LBS | RESPIRATION RATE: 16 BRPM | DIASTOLIC BLOOD PRESSURE: 79 MMHG | SYSTOLIC BLOOD PRESSURE: 133 MMHG | OXYGEN SATURATION: 98 % | HEART RATE: 92 BPM

## 2020-12-18 LAB
BASOPHILS # BLD AUTO: 0.03 K/UL — SIGNIFICANT CHANGE UP (ref 0–0.2)
BASOPHILS NFR BLD AUTO: 0.5 % — SIGNIFICANT CHANGE UP (ref 0–2)
EOSINOPHIL # BLD AUTO: 0.08 K/UL — SIGNIFICANT CHANGE UP (ref 0–0.5)
EOSINOPHIL NFR BLD AUTO: 1.3 % — SIGNIFICANT CHANGE UP (ref 0–6)
HCT VFR BLD CALC: 31.9 % — LOW (ref 39–50)
HGB BLD-MCNC: 10.7 G/DL — LOW (ref 13–17)
IMM GRANULOCYTES NFR BLD AUTO: 0.8 % — SIGNIFICANT CHANGE UP (ref 0–1.5)
LYMPHOCYTES # BLD AUTO: 1.45 K/UL — SIGNIFICANT CHANGE UP (ref 1–3.3)
LYMPHOCYTES # BLD AUTO: 23 % — SIGNIFICANT CHANGE UP (ref 13–44)
MCHC RBC-ENTMCNC: 31.3 PG — SIGNIFICANT CHANGE UP (ref 27–34)
MCHC RBC-ENTMCNC: 33.5 G/DL — SIGNIFICANT CHANGE UP (ref 32–36)
MCV RBC AUTO: 93.3 FL — SIGNIFICANT CHANGE UP (ref 80–100)
MONOCYTES # BLD AUTO: 0.85 K/UL — SIGNIFICANT CHANGE UP (ref 0–0.9)
MONOCYTES NFR BLD AUTO: 13.5 % — SIGNIFICANT CHANGE UP (ref 2–14)
NEUTROPHILS # BLD AUTO: 3.84 K/UL — SIGNIFICANT CHANGE UP (ref 1.8–7.4)
NEUTROPHILS NFR BLD AUTO: 60.9 % — SIGNIFICANT CHANGE UP (ref 43–77)
NRBC # BLD: 0 /100 WBCS — SIGNIFICANT CHANGE UP (ref 0–0)
PLATELET # BLD AUTO: 366 K/UL — SIGNIFICANT CHANGE UP (ref 150–400)
RBC # BLD: 3.42 M/UL — LOW (ref 4.2–5.8)
RBC # FLD: 14.6 % — HIGH (ref 10.3–14.5)
WBC # BLD: 6.3 K/UL — SIGNIFICANT CHANGE UP (ref 3.8–10.5)
WBC # FLD AUTO: 6.3 K/UL — SIGNIFICANT CHANGE UP (ref 3.8–10.5)

## 2020-12-18 PROCEDURE — 99214 OFFICE O/P EST MOD 30 MIN: CPT

## 2020-12-18 NOTE — REVIEW OF SYSTEMS
[Fatigue] : fatigue [Recent Change In Weight] : ~T no recent weight change [SOB on Exertion] : shortness of breath during exertion [Negative] : Allergic/Immunologic

## 2020-12-18 NOTE — ASSESSMENT
[FreeTextEntry1] : 71 yo with newly diagnosed NSCLC, at least stage IIIB, maybe stage IV if contralateral nodules are malignant\par Reviewed brain MRI and CT scans- overall POD with clear progression of contralateral nodules confirming stage IV disease. MRI brain and CT abd reveal no mets\par Discussed at TB- no role for bx of contralateral nodule- PET/CT and recent CT scans are convincing for metastatic involvement of left lung. \par PDL1 was 80% but tumor NGS could not be performed because of QNS\par Blood NGS through [facility] revealed- HRAS and TP53 mutations. \par Pt starting with carbo/alimta/pembro as front line option. Completed 2 cycles wo AE\par Continue folic acid. B12 every other treatment \par Reimage after C#3. Orders entered\par f/u BW \par \par OV 3 weeks on same day as C#4 for review of imaging\par  [Palliative] : Goals of care discussed with patient: Palliative

## 2020-12-18 NOTE — HISTORY OF PRESENT ILLNESS
[Disease: _____________________] : Disease: [unfilled] [M: ___] : M[unfilled] [AJCC Stage: ____] : AJCC Stage: [unfilled] [de-identified] : 69 yo M with tobacco smoking hx and 911 exposure ( being followed at [facility],  was diagnosed with COPD/emphysema (dx soon after 9/11), had screening CT in 2019 which showed a lung mass. Work up at [facility]- solitary mass which was resected (benign-organizing PNA). He was referred to pulm grant a year ago by [facility] program but he made an appointment this year with Dr. Pritchard. He referred him for screening CT in Feb which was done in August and showed right upper lobe 3.7 x 3.8 cm mass with interval increase in size since January 7, 2019 worrisome for neoplasm possibly a primary lung neoplasm. Multiple bilateral smaller nodular opacities are new since January 7, 2019 may represent metastatic disease. Enlarged anterior mediastinal lymph node worrisome for metastatic disease. PET/CT 8/25/2020 IMPRESSION: Since outside PET/CT 1/30/2019: Considerable interval increased size and FDG avidity of a now spiculated right upper lobe mass with central necrosis suspicious for primary lung malignancy. New FDG avid enlarged mediastinal lymph nodes and bilateral pulmonary nodules probably metastatic disease. Indeterminate FDG avid left intraparotid focus, not well evaluated secondary to misregistration artifact, however appears new since prior PET/CT, possibly metastatic. He underwent CT guided bx by Dr. Barraza at [facility] which was suggestive of malignancy. He subsequently underwent bronch and EBUS/bx of level 4 LN which was c/w adeno ca, lung primary. He is still doing well from a symptomatic standpoint. He has lost some weight which he usually does every Summer. He denies fevers, chills, dyspnea, chest pain/tightness, cough, wheeze. Continues to work on a full-time basis as a . \par 10/27/20: pt has no new complaints. He had Ct scans and MRi of brain interval enlargement of multiple bilateral pulmonary masses and mediastinal adenopathy consistent with progressive disease. CT abdomen and MRI brain revealed no mets. \par \par 11/6/2020: Pt presents prior to starting treatment today to review regimen, address any concerns and provide written consent. He offers no new complaints. \par \par 11/27/2020: Pt returns for follow up. S/p first cycle. Tolerating well . NOT SMOKING!!!!! Doing well. Had one episode of near syncope (vaso-vagal). Pt states he was very dehydrated. No further episodes since. Pt states he "feels great"\par \par 12/18/2020: Pt returns for follow up. Tolerating treatment wo any AE. Only reports mild occasional fatigue. Scheduled for C#3 today [de-identified] : adeno ca [de-identified] : PDL1 80%

## 2020-12-21 ENCOUNTER — NON-APPOINTMENT (OUTPATIENT)
Age: 70
End: 2020-12-21

## 2020-12-29 ENCOUNTER — FORM ENCOUNTER (OUTPATIENT)
Age: 70
End: 2020-12-29

## 2021-01-03 ENCOUNTER — FORM ENCOUNTER (OUTPATIENT)
Age: 71
End: 2021-01-03

## 2021-01-05 ENCOUNTER — OUTPATIENT (OUTPATIENT)
Dept: OUTPATIENT SERVICES | Facility: HOSPITAL | Age: 71
LOS: 1 days | End: 2021-01-05
Payer: COMMERCIAL

## 2021-01-05 ENCOUNTER — RESULT REVIEW (OUTPATIENT)
Age: 71
End: 2021-01-05

## 2021-01-05 ENCOUNTER — OUTPATIENT (OUTPATIENT)
Dept: OUTPATIENT SERVICES | Facility: HOSPITAL | Age: 71
LOS: 1 days | Discharge: HOME | End: 2021-01-05

## 2021-01-05 ENCOUNTER — APPOINTMENT (OUTPATIENT)
Dept: CT IMAGING | Facility: CLINIC | Age: 71
End: 2021-01-05
Payer: COMMERCIAL

## 2021-01-05 DIAGNOSIS — Z98.890 OTHER SPECIFIED POSTPROCEDURAL STATES: Chronic | ICD-10-CM

## 2021-01-05 DIAGNOSIS — C46.50 KAPOSI'S SARCOMA OF UNSPECIFIED LUNG: ICD-10-CM

## 2021-01-05 DIAGNOSIS — C34.90 MALIGNANT NEOPLASM OF UNSPECIFIED PART OF UNSPECIFIED BRONCHUS OR LUNG: ICD-10-CM

## 2021-01-05 DIAGNOSIS — I70.0 ATHEROSCLEROSIS OF AORTA: ICD-10-CM

## 2021-01-05 DIAGNOSIS — Z00.8 ENCOUNTER FOR OTHER GENERAL EXAMINATION: ICD-10-CM

## 2021-01-05 DIAGNOSIS — R91.8 OTHER NONSPECIFIC ABNORMAL FINDING OF LUNG FIELD: ICD-10-CM

## 2021-01-05 PROCEDURE — 71260 CT THORAX DX C+: CPT | Mod: 26

## 2021-01-05 PROCEDURE — 74160 CT ABDOMEN W/CONTRAST: CPT | Mod: 26

## 2021-01-08 ENCOUNTER — APPOINTMENT (OUTPATIENT)
Dept: HEMATOLOGY ONCOLOGY | Facility: CLINIC | Age: 71
End: 2021-01-08
Payer: COMMERCIAL

## 2021-01-08 ENCOUNTER — LABORATORY RESULT (OUTPATIENT)
Age: 71
End: 2021-01-08

## 2021-01-08 ENCOUNTER — RESULT REVIEW (OUTPATIENT)
Age: 71
End: 2021-01-08

## 2021-01-08 ENCOUNTER — APPOINTMENT (OUTPATIENT)
Dept: INFUSION THERAPY | Facility: HOSPITAL | Age: 71
End: 2021-01-08

## 2021-01-08 VITALS
DIASTOLIC BLOOD PRESSURE: 84 MMHG | BODY MASS INDEX: 19.41 KG/M2 | RESPIRATION RATE: 14 BRPM | OXYGEN SATURATION: 99 % | TEMPERATURE: 98 F | HEART RATE: 102 BPM | WEIGHT: 135.58 LBS | SYSTOLIC BLOOD PRESSURE: 151 MMHG

## 2021-01-08 LAB
BASOPHILS # BLD AUTO: 0.03 K/UL — SIGNIFICANT CHANGE UP (ref 0–0.2)
BASOPHILS NFR BLD AUTO: 0.6 % — SIGNIFICANT CHANGE UP (ref 0–2)
EOSINOPHIL # BLD AUTO: 0.07 K/UL — SIGNIFICANT CHANGE UP (ref 0–0.5)
EOSINOPHIL NFR BLD AUTO: 1.4 % — SIGNIFICANT CHANGE UP (ref 0–6)
HCT VFR BLD CALC: 31.6 % — LOW (ref 39–50)
HGB BLD-MCNC: 11 G/DL — LOW (ref 13–17)
IMM GRANULOCYTES NFR BLD AUTO: 1 % — SIGNIFICANT CHANGE UP (ref 0–1.5)
LYMPHOCYTES # BLD AUTO: 1.05 K/UL — SIGNIFICANT CHANGE UP (ref 1–3.3)
LYMPHOCYTES # BLD AUTO: 20.8 % — SIGNIFICANT CHANGE UP (ref 13–44)
MCHC RBC-ENTMCNC: 32.8 PG — SIGNIFICANT CHANGE UP (ref 27–34)
MCHC RBC-ENTMCNC: 34.8 G/DL — SIGNIFICANT CHANGE UP (ref 32–36)
MCV RBC AUTO: 94.3 FL — SIGNIFICANT CHANGE UP (ref 80–100)
MONOCYTES # BLD AUTO: 0.65 K/UL — SIGNIFICANT CHANGE UP (ref 0–0.9)
MONOCYTES NFR BLD AUTO: 12.9 % — SIGNIFICANT CHANGE UP (ref 2–14)
NEUTROPHILS # BLD AUTO: 3.2 K/UL — SIGNIFICANT CHANGE UP (ref 1.8–7.4)
NEUTROPHILS NFR BLD AUTO: 63.3 % — SIGNIFICANT CHANGE UP (ref 43–77)
NRBC # BLD: 0 /100 WBCS — SIGNIFICANT CHANGE UP (ref 0–0)
PLATELET # BLD AUTO: 313 K/UL — SIGNIFICANT CHANGE UP (ref 150–400)
RBC # BLD: 3.35 M/UL — LOW (ref 4.2–5.8)
RBC # FLD: 16.9 % — HIGH (ref 10.3–14.5)
WBC # BLD: 5.05 K/UL — SIGNIFICANT CHANGE UP (ref 3.8–10.5)
WBC # FLD AUTO: 5.05 K/UL — SIGNIFICANT CHANGE UP (ref 3.8–10.5)

## 2021-01-08 PROCEDURE — 99214 OFFICE O/P EST MOD 30 MIN: CPT

## 2021-01-08 NOTE — HISTORY OF PRESENT ILLNESS
[Disease: _____________________] : Disease: [unfilled] [M: ___] : M[unfilled] [AJCC Stage: ____] : AJCC Stage: [unfilled] [de-identified] : 71 yo M with tobacco smoking hx and 911 exposure ( being followed [facility],  was diagnosed with COPD/emphysema (dx soon after 9/11), had screening CT in 2019 which showed a lung mass. Work up at [facility]- solitary mass which was resected (benign-organizing PNA). He was referred to pulm grant a year ago by [facility] program but he made an appointment this year with Dr. Pritchard. He referred him for screening CT in Feb which was done in August and showed right upper lobe 3.7 x 3.8 cm mass with interval increase in size since January 7, 2019 worrisome for neoplasm possibly a primary lung neoplasm. Multiple bilateral smaller nodular opacities are new since January 7, 2019 may represent metastatic disease. Enlarged anterior mediastinal lymph node worrisome for metastatic disease. PET/CT 8/25/2020 IMPRESSION: Since outside PET/CT 1/30/2019: Considerable interval increased size and FDG avidity of a now spiculated right upper lobe mass with central necrosis suspicious for primary lung malignancy. New FDG avid enlarged mediastinal lymph nodes and bilateral pulmonary nodules probably metastatic disease. Indeterminate FDG avid left intraparotid focus, not well evaluated secondary to misregistration artifact, however appears new since prior PET/CT, possibly metastatic. He underwent CT guided bx by Dr. Barraza at [facility] which was suggestive of malignancy. He subsequently underwent bronch and EBUS/bx of level 4 LN which was c/w adeno ca, lung primary. He is still doing well from a symptomatic standpoint. He has lost some weight which he usually does every Summer. He denies fevers, chills, dyspnea, chest pain/tightness, cough, wheeze. Continues to work on a full-time basis as a . \par 10/27/20: pt has no new complaints. He had Ct scans and MRi of brain interval enlargement of multiple bilateral pulmonary masses and mediastinal adenopathy consistent with progressive disease. CT abdomen and MRI brain revealed no mets. \par \par 11/6/2020: Pt presents prior to starting treatment today to review regimen, address any concerns and provide written consent. He offers no new complaints. \par \par 11/27/2020: Pt returns for follow up. S/p first cycle. Tolerating well . NOT SMOKING!!!!! Doing well. Had one episode of near syncope (vaso-vagal). Pt states he was very dehydrated. No further episodes since. Pt states he "feels great"\par \par 12/18/2020: Pt returns for follow up. Tolerating treatment wo any AE. Only reports mild occasional fatigue. Scheduled for C#3 today\par \par 1/8/2021: Pt returns for follow up and discussion of findings on recent imaging. He is feeling well. Appetite good. Occasional fatigue. States he is losing his hair and is very unhappy about it. \par  [de-identified] : adeno ca [de-identified] : PDL1 80%

## 2021-01-08 NOTE — ASSESSMENT
[FreeTextEntry1] : 69 yo with newly diagnosed NSCLC, at least stage IIIB, maybe stage IV if contralateral nodules are malignant\par Reviewed brain MRI and CT scans- overall POD with clear progression of contralateral nodules confirming stage IV disease. MRI brain and CT abd reveal no mets\par Discussed at TB- no role for bx of contralateral nodule- PET/CT and recent CT scans are convincing for metastatic involvement of left lung. \par PDL1 was 80% but tumor NGS could not be performed because of QNS\par Blood NGS through [facility] revealed- HRAS and TP53 mutations. \par Pt starting with carbo/alimta/pembro as front line option. Completed 3 cycles wo AE. C#4 today\par Continue folic acid. B12 every other treatment \par Imaging personally reviewed with patient. Nice response to treatment with improvement in all areas of disease. Will continue current regimen for 6 cycles and then drop carbo and continue with alimta/keytruda maintenance.\par \par OV with next treatment [Palliative] : Goals of care discussed with patient: Palliative

## 2021-01-10 DIAGNOSIS — Z51.11 ENCOUNTER FOR ANTINEOPLASTIC CHEMOTHERAPY: ICD-10-CM

## 2021-01-10 DIAGNOSIS — R11.2 NAUSEA WITH VOMITING, UNSPECIFIED: ICD-10-CM

## 2021-01-13 ENCOUNTER — RESULT CHARGE (OUTPATIENT)
Age: 71
End: 2021-01-13

## 2021-01-14 ENCOUNTER — RESULT REVIEW (OUTPATIENT)
Age: 71
End: 2021-01-14

## 2021-01-14 ENCOUNTER — APPOINTMENT (OUTPATIENT)
Dept: HEMATOLOGY ONCOLOGY | Facility: CLINIC | Age: 71
End: 2021-01-14
Payer: COMMERCIAL

## 2021-01-14 ENCOUNTER — EMERGENCY (EMERGENCY)
Facility: HOSPITAL | Age: 71
LOS: 1 days | End: 2021-01-14
Attending: EMERGENCY MEDICINE
Payer: COMMERCIAL

## 2021-01-14 VITALS
BODY MASS INDEX: 19.41 KG/M2 | SYSTOLIC BLOOD PRESSURE: 109 MMHG | WEIGHT: 135.58 LBS | RESPIRATION RATE: 14 BRPM | DIASTOLIC BLOOD PRESSURE: 76 MMHG | TEMPERATURE: 98.2 F | HEART RATE: 16 BPM | OXYGEN SATURATION: 95 %

## 2021-01-14 VITALS
WEIGHT: 136.03 LBS | TEMPERATURE: 98 F | SYSTOLIC BLOOD PRESSURE: 133 MMHG | OXYGEN SATURATION: 98 % | HEIGHT: 69 IN | DIASTOLIC BLOOD PRESSURE: 87 MMHG | HEART RATE: 88 BPM | RESPIRATION RATE: 18 BRPM

## 2021-01-14 DIAGNOSIS — R94.31 ABNORMAL ELECTROCARDIOGRAM [ECG] [EKG]: ICD-10-CM

## 2021-01-14 DIAGNOSIS — Z98.890 OTHER SPECIFIED POSTPROCEDURAL STATES: Chronic | ICD-10-CM

## 2021-01-14 LAB
ALBUMIN SERPL ELPH-MCNC: 3.6 G/DL — SIGNIFICANT CHANGE UP (ref 3.3–5)
ALP SERPL-CCNC: 88 U/L — SIGNIFICANT CHANGE UP (ref 40–120)
ALT FLD-CCNC: 20 U/L — SIGNIFICANT CHANGE UP (ref 10–45)
ANION GAP SERPL CALC-SCNC: 17 MMOL/L — SIGNIFICANT CHANGE UP (ref 5–17)
AST SERPL-CCNC: 27 U/L — SIGNIFICANT CHANGE UP (ref 10–40)
BASOPHILS # BLD AUTO: 0 K/UL — SIGNIFICANT CHANGE UP (ref 0–0.2)
BASOPHILS # BLD AUTO: 0 K/UL — SIGNIFICANT CHANGE UP (ref 0–0.2)
BASOPHILS NFR BLD AUTO: 0 % — SIGNIFICANT CHANGE UP (ref 0–2)
BASOPHILS NFR BLD AUTO: 0 % — SIGNIFICANT CHANGE UP (ref 0–2)
BILIRUB SERPL-MCNC: 0.8 MG/DL — SIGNIFICANT CHANGE UP (ref 0.2–1.2)
BUN SERPL-MCNC: 42 MG/DL — HIGH (ref 7–23)
CALCIUM SERPL-MCNC: 8.9 MG/DL — SIGNIFICANT CHANGE UP (ref 8.4–10.5)
CHLORIDE SERPL-SCNC: 86 MMOL/L — LOW (ref 96–108)
CO2 SERPL-SCNC: 20 MMOL/L — LOW (ref 22–31)
CREAT SERPL-MCNC: 1.49 MG/DL — HIGH (ref 0.5–1.3)
EOSINOPHIL # BLD AUTO: 0 K/UL — SIGNIFICANT CHANGE UP (ref 0–0.5)
EOSINOPHIL # BLD AUTO: 0.03 K/UL — SIGNIFICANT CHANGE UP (ref 0–0.5)
EOSINOPHIL NFR BLD AUTO: 0 % — SIGNIFICANT CHANGE UP (ref 0–6)
EOSINOPHIL NFR BLD AUTO: 0.9 % — SIGNIFICANT CHANGE UP (ref 0–6)
GLUCOSE SERPL-MCNC: 106 MG/DL — HIGH (ref 70–99)
HCT VFR BLD CALC: 33.9 % — LOW (ref 39–50)
HCT VFR BLD CALC: 34.2 % — LOW (ref 39–50)
HGB BLD-MCNC: 11.8 G/DL — LOW (ref 13–17)
HGB BLD-MCNC: 11.9 G/DL — LOW (ref 13–17)
LYMPHOCYTES # BLD AUTO: 0.63 K/UL — LOW (ref 1–3.3)
LYMPHOCYTES # BLD AUTO: 1.07 K/UL — SIGNIFICANT CHANGE UP (ref 1–3.3)
LYMPHOCYTES # BLD AUTO: 17 % — SIGNIFICANT CHANGE UP (ref 13–44)
LYMPHOCYTES # BLD AUTO: 35.6 % — SIGNIFICANT CHANGE UP (ref 13–44)
MCHC RBC-ENTMCNC: 32.6 PG — SIGNIFICANT CHANGE UP (ref 27–34)
MCHC RBC-ENTMCNC: 32.7 PG — SIGNIFICANT CHANGE UP (ref 27–34)
MCHC RBC-ENTMCNC: 34.5 G/DL — SIGNIFICANT CHANGE UP (ref 32–36)
MCHC RBC-ENTMCNC: 35.1 GM/DL — SIGNIFICANT CHANGE UP (ref 32–36)
MCV RBC AUTO: 92.9 FL — SIGNIFICANT CHANGE UP (ref 80–100)
MCV RBC AUTO: 94.7 FL — SIGNIFICANT CHANGE UP (ref 80–100)
MONOCYTES # BLD AUTO: 0.18 K/UL — SIGNIFICANT CHANGE UP (ref 0–0.9)
MONOCYTES # BLD AUTO: 0.18 K/UL — SIGNIFICANT CHANGE UP (ref 0–0.9)
MONOCYTES NFR BLD AUTO: 5 % — SIGNIFICANT CHANGE UP (ref 2–14)
MONOCYTES NFR BLD AUTO: 6.1 % — SIGNIFICANT CHANGE UP (ref 2–14)
NEUTROPHILS # BLD AUTO: 1.72 K/UL — LOW (ref 1.8–7.4)
NEUTROPHILS # BLD AUTO: 2.88 K/UL — SIGNIFICANT CHANGE UP (ref 1.8–7.4)
NEUTROPHILS NFR BLD AUTO: 57.4 % — SIGNIFICANT CHANGE UP (ref 43–77)
NEUTROPHILS NFR BLD AUTO: 78 % — HIGH (ref 43–77)
NRBC # BLD: 1 /100 — HIGH (ref 0–0)
NRBC # BLD: SIGNIFICANT CHANGE UP /100 WBCS (ref 0–0)
PLAT MORPH BLD: NORMAL — SIGNIFICANT CHANGE UP
PLATELET # BLD AUTO: 311 K/UL — SIGNIFICANT CHANGE UP (ref 150–400)
PLATELET # BLD AUTO: 359 K/UL — SIGNIFICANT CHANGE UP (ref 150–400)
POTASSIUM SERPL-MCNC: 4.6 MMOL/L — SIGNIFICANT CHANGE UP (ref 3.5–5.3)
POTASSIUM SERPL-SCNC: 4.6 MMOL/L — SIGNIFICANT CHANGE UP (ref 3.5–5.3)
PROT SERPL-MCNC: 7.1 G/DL — SIGNIFICANT CHANGE UP (ref 6–8.3)
RBC # BLD: 3.61 M/UL — LOW (ref 4.2–5.8)
RBC # BLD: 3.65 M/UL — LOW (ref 4.2–5.8)
RBC # FLD: 15.9 % — HIGH (ref 10.3–14.5)
RBC # FLD: 16 % — HIGH (ref 10.3–14.5)
RBC BLD AUTO: SIGNIFICANT CHANGE UP
SARS-COV-2 RNA SPEC QL NAA+PROBE: SIGNIFICANT CHANGE UP
SODIUM SERPL-SCNC: 123 MMOL/L — LOW (ref 135–145)
TROPONIN T, HIGH SENSITIVITY RESULT: 21 NG/L — SIGNIFICANT CHANGE UP (ref 0–51)
TROPONIN T, HIGH SENSITIVITY RESULT: 25 NG/L — SIGNIFICANT CHANGE UP (ref 0–51)
WBC # BLD: 3 K/UL — LOW (ref 3.8–10.5)
WBC # BLD: 3.69 K/UL — LOW (ref 3.8–10.5)
WBC # FLD AUTO: 3 K/UL — LOW (ref 3.8–10.5)
WBC # FLD AUTO: 3.69 K/UL — LOW (ref 3.8–10.5)

## 2021-01-14 PROCEDURE — 71045 X-RAY EXAM CHEST 1 VIEW: CPT | Mod: 26

## 2021-01-14 PROCEDURE — 99215 OFFICE O/P EST HI 40 MIN: CPT

## 2021-01-14 PROCEDURE — 99218: CPT

## 2021-01-14 PROCEDURE — 93010 ELECTROCARDIOGRAM REPORT: CPT

## 2021-01-14 RX ORDER — SODIUM CHLORIDE 9 MG/ML
1000 INJECTION INTRAMUSCULAR; INTRAVENOUS; SUBCUTANEOUS
Refills: 0 | Status: DISCONTINUED | OUTPATIENT
Start: 2021-01-14 | End: 2021-01-17

## 2021-01-14 RX ORDER — CALCIUM CARBONATE 500(1250)
1 TABLET ORAL ONCE
Refills: 0 | Status: COMPLETED | OUTPATIENT
Start: 2021-01-14 | End: 2021-01-14

## 2021-01-14 RX ADMIN — SODIUM CHLORIDE 200 MILLILITER(S): 9 INJECTION INTRAMUSCULAR; INTRAVENOUS; SUBCUTANEOUS at 17:03

## 2021-01-14 RX ADMIN — SODIUM CHLORIDE 1000 MILLILITER(S): 9 INJECTION INTRAMUSCULAR; INTRAVENOUS; SUBCUTANEOUS at 22:05

## 2021-01-14 RX ADMIN — SODIUM CHLORIDE 200 MILLILITER(S): 9 INJECTION INTRAMUSCULAR; INTRAVENOUS; SUBCUTANEOUS at 22:06

## 2021-01-14 RX ADMIN — Medication 1 TABLET(S): at 20:30

## 2021-01-14 NOTE — ED PROVIDER NOTE - PHYSICAL EXAMINATION
Gen: AAOx3, non-toxic  Head: NCAT  HEENT: EOMI, oral mucosa moist, normal conjunctiva  Lung: CTAB, no respiratory distress, no wheezes/rhonchi/rales B/L, speaking in full sentences  CV: RRR, no murmurs, rubs or gallops  Abd: soft, NTND, no guarding, no CVA tenderness  MSK: no visible deformities  Neuro: No focal sensory or motor deficits  Skin: Warm, well perfused, no rash  Psych: normal affect.     Tanmay Tavares PGY3

## 2021-01-14 NOTE — ED CDU PROVIDER INITIAL DAY NOTE - PROGRESS NOTE DETAILS
Spoke with Dr. Rosales who is on for unattached cards today, will come see pt today. Pt resting comfortably. NAD. No complaints. VSS. no events on tele, nsr, will cont to monitor. Patient seen at bedside. VSS. No acute events on telemetry. Patient resting comfortably, although states that he has been burping. States that he has taken Tums in the past with relief. Per patient the medications that he has been taking include Amlodipine 10mg and Folic Acid 1mg, in the morning. Will reevaluate. - Philomena Raya PA-C Patient signed out to myself, per EMA Hemphill patient had outpatient EKG changes, T wave flatten in the anterior leads. Patient seen at bedside. VSS. No acute events on telemetry. Patient resting comfortably, although states that he has been burping. States that he has taken Tums in the past with relief. Per patient the medications that he has been taking include Amlodipine 10mg and Folic Acid 1mg, in the morning. Will reevaluate. - Philomena Raya PA-C

## 2021-01-14 NOTE — ED CDU PROVIDER INITIAL DAY NOTE - NS ED ROS FT
ROS:  GENERAL: No fever, no chills  EYES: no change in vision  HEENT: no trouble swallowing, no trouble speaking  CARDIAC: no chest pain  PULMONARY: no cough, no shortness of breath  GI: +abdominal pain, N/V, throat pain. no diarrhea  : No dysuria, no frequency, no change in appearance, or odor of urine  SKIN: no rashes  NEURO: no headache, no weakness  MSK: No joint pain

## 2021-01-14 NOTE — ED ADULT NURSE REASSESSMENT NOTE - NS ED NURSE REASSESS COMMENT FT1
Pt report received from Josh TOSCANO RN. Pt transferred to cdu Bed 7 for stress test due to epigastric pain. Pt a/ox3 denies respiratory distress, sob, dyspnea, C/P, palpitations, n/v/d at this time. Pt states symptoms have improved.  VSS, afebrile, IV clean/dry/intact. Pt aware of plan of care, call bell within reach ,safety maintained. Will monitor and assess.
Received pt from KEESHA Huntley, received pt alert and responsive, oriented x4, denies any respiratory distress, SOB, or difficulty breathing. Pt transferred to CDU for GERD sensation when swallowing. Pt has no complaints of chest pain, pressure, tightness, SOB, diaphoresis, dizziness, lightheadedness, N/V, F/C, heart palpitations. On tele pt is SR hr: 80's. pending stress in AM.  IV in place, patent and free of signs of infiltration, V/S stable, pt afebrile. Pt educated on unit and unit rules, instructed patient to notify RN of any needed assistance, Pt verbalizes understanding, Call bell placed within reach. Safety maintained. Will continue to monitor.

## 2021-01-14 NOTE — ED PROVIDER NOTE - OBJECTIVE STATEMENT
70M with PMH including HTN, HLD, COPD, and lung cancer on Keytruda p/w epigastric pain, throat pain, and hiccups for 3 days. Associated with nausea and an episode of vomiting. States he usually gets similar symptoms after his Keytruda infusions, which he received 2 days before onset of his current symptoms. Denies any other symptoms including fever, cough, chest pain, SOB, diarrhea. Spoke to his oncologist who recommended he come in for evaluation.

## 2021-01-14 NOTE — ED CDU PROVIDER INITIAL DAY NOTE - DETAILS
CHEST PAIN  -[facility]  -JUNITOMI  -[facility] EVAL  -STRESS TEST  -CASE D/W ATTENDING

## 2021-01-14 NOTE — ED PROVIDER NOTE - CARE PLAN
Principal Discharge DX:	EKG abnormalities  Secondary Diagnosis:	Malignant neoplasm of lung, unspecified laterality, unspecified part of lung

## 2021-01-14 NOTE — ED ADULT NURSE NOTE - OBJECTIVE STATEMENT
70y Male A&OX3 came by EMS c/o chest discomfort resolved. PMH of Actinic keratosis, Adenocarcinoma, COPD, HTN, Lentigines, Lung cancer. As per Pt, Pt had epigastric pain, throat pain and hiccups since Monday, last Keytruda infusion on Friday. Pt went to checkup in [facility] today, came by EMS. Pt presents S1 and S2 heart sounds present, +2 pulses in all extremities with full ROM. Denies chest pain, sob, ha, n/v/d, abdominal pain, f/c, urinary symptoms, hematuria. Upon examination, full facial symmetry, no JVD or trach deviation, lung sounds clear and adequate chest rise, S1 and S2 heart sounds present, +2 pulses in all extremities with full ROM and equal strength, cap refill <2 seconds, ABD soft, non distended and non tender, pelvis intact.

## 2021-01-14 NOTE — ED CDU PROVIDER INITIAL DAY NOTE - MEDICAL DECISION MAKING DETAILS
jessika lung ca on chemo with throat pain and nausea post infusion w ith ekg changes in pcp office - no cp no sob ekg in ed nonischemic  delta trop, tele , and stress -

## 2021-01-14 NOTE — ED PROVIDER NOTE - CLINICAL SUMMARY MEDICAL DECISION MAKING FREE TEXT BOX
Epigastric pain, throat pain, hiccups, and N/V in pt on Keytruda for lung ca. States current symptoms are typical s/p his infusions. No chest pain, fever, cough. Pt well appearing, HD stable, abd soft/non-tender. Will assess for ACS and pancreatitis. Low suspicion for acute surgical abdomen based on exam and history. Epigastric pain, throat pain, hiccups, and N/V in pt on Keytruda for lung ca. States current symptoms are typical s/p his infusions. No chest pain, fever, cough. Pt well appearing, HD stable, abd soft/non-tender. Will assess for ACS and pancreatitis. Low suspicion for acute surgical abdomen based on exam and history.  jessika -70 m with lung ca on chemo with some nausea and throat pain similar to previous infusion side effects x several days seen in office today with st dep t flat in v2/v3 - no cp no sob - ekg in ed non -ischemic no st depression currently -- will send trop - cxr and reeval- if neg consider outpt cards w/u will discuss w hem onc

## 2021-01-14 NOTE — ED PROVIDER NOTE - NS ED ROS FT
ROS:  GENERAL: No fever, no chills  EYES: no change in vision  HEENT: no trouble swallowing, no trouble speaking  CARDIAC: no chest pain  PULMONARY: no cough, no shortness of breath  GI: +abdominal pain, N/V, throat pain. no diarrhea  : No dysuria, no frequency, no change in appearance, or odor of urine  SKIN: no rashes  NEURO: no headache, no weakness  MSK: No joint pain    Tanamy Tavares PGY3

## 2021-01-14 NOTE — ED PROVIDER NOTE - PMH
Actinic keratosis    Adenocarcinoma    COPD (chronic obstructive pulmonary disease)    HTN (hypertension)    Lentigines    Lung cancer    Multiple benign nevi    Solitary pulmonary nodule

## 2021-01-15 VITALS
DIASTOLIC BLOOD PRESSURE: 78 MMHG | SYSTOLIC BLOOD PRESSURE: 130 MMHG | TEMPERATURE: 99 F | HEART RATE: 89 BPM | OXYGEN SATURATION: 93 % | RESPIRATION RATE: 16 BRPM

## 2021-01-15 DIAGNOSIS — C34.90 MALIGNANT NEOPLASM OF UNSPECIFIED PART OF UNSPECIFIED BRONCHUS OR LUNG: ICD-10-CM

## 2021-01-15 DIAGNOSIS — I26.99 OTHER PULMONARY EMBOLISM WITHOUT ACUTE COR PULMONALE: ICD-10-CM

## 2021-01-15 DIAGNOSIS — R07.9 CHEST PAIN, UNSPECIFIED: ICD-10-CM

## 2021-01-15 LAB
A1C WITH ESTIMATED AVERAGE GLUCOSE RESULT: 5.2 % — SIGNIFICANT CHANGE UP (ref 4–5.6)
ANION GAP SERPL CALC-SCNC: 12 MMOL/L — SIGNIFICANT CHANGE UP (ref 5–17)
ANION GAP SERPL CALC-SCNC: 12 MMOL/L — SIGNIFICANT CHANGE UP (ref 5–17)
APPEARANCE UR: CLEAR — SIGNIFICANT CHANGE UP
BACTERIA # UR AUTO: 0 — SIGNIFICANT CHANGE UP
BILIRUB UR-MCNC: NEGATIVE — SIGNIFICANT CHANGE UP
BUN SERPL-MCNC: 32 MG/DL — HIGH (ref 7–23)
BUN SERPL-MCNC: 37 MG/DL — HIGH (ref 7–23)
CALCIUM SERPL-MCNC: 8.1 MG/DL — LOW (ref 8.4–10.5)
CALCIUM SERPL-MCNC: 8.6 MG/DL — SIGNIFICANT CHANGE UP (ref 8.4–10.5)
CHLORIDE SERPL-SCNC: 93 MMOL/L — LOW (ref 96–108)
CHLORIDE SERPL-SCNC: 96 MMOL/L — SIGNIFICANT CHANGE UP (ref 96–108)
CHOLEST SERPL-MCNC: 135 MG/DL — SIGNIFICANT CHANGE UP
CO2 SERPL-SCNC: 22 MMOL/L — SIGNIFICANT CHANGE UP (ref 22–31)
CO2 SERPL-SCNC: 22 MMOL/L — SIGNIFICANT CHANGE UP (ref 22–31)
COLOR SPEC: COLORLESS — SIGNIFICANT CHANGE UP
CREAT SERPL-MCNC: 1.04 MG/DL — SIGNIFICANT CHANGE UP (ref 0.5–1.3)
CREAT SERPL-MCNC: 1.07 MG/DL — SIGNIFICANT CHANGE UP (ref 0.5–1.3)
DIFF PNL FLD: NEGATIVE — SIGNIFICANT CHANGE UP
EPI CELLS # UR: 0 /HPF — SIGNIFICANT CHANGE UP
ESTIMATED AVERAGE GLUCOSE: 103 MG/DL — SIGNIFICANT CHANGE UP (ref 68–114)
GLUCOSE SERPL-MCNC: 112 MG/DL — HIGH (ref 70–99)
GLUCOSE SERPL-MCNC: 93 MG/DL — SIGNIFICANT CHANGE UP (ref 70–99)
GLUCOSE UR QL: ABNORMAL
HDLC SERPL-MCNC: 45 MG/DL — SIGNIFICANT CHANGE UP
HYALINE CASTS # UR AUTO: 0 /LPF — SIGNIFICANT CHANGE UP (ref 0–2)
KETONES UR-MCNC: NEGATIVE — SIGNIFICANT CHANGE UP
LEUKOCYTE ESTERASE UR-ACNC: NEGATIVE — SIGNIFICANT CHANGE UP
LIPID PNL WITH DIRECT LDL SERPL: 71 MG/DL — SIGNIFICANT CHANGE UP
NITRITE UR-MCNC: NEGATIVE — SIGNIFICANT CHANGE UP
NON HDL CHOLESTEROL: 89 MG/DL — SIGNIFICANT CHANGE UP
PH UR: 6 — SIGNIFICANT CHANGE UP (ref 5–8)
POTASSIUM SERPL-MCNC: 3.7 MMOL/L — SIGNIFICANT CHANGE UP (ref 3.5–5.3)
POTASSIUM SERPL-MCNC: 3.9 MMOL/L — SIGNIFICANT CHANGE UP (ref 3.5–5.3)
POTASSIUM SERPL-SCNC: 3.7 MMOL/L — SIGNIFICANT CHANGE UP (ref 3.5–5.3)
POTASSIUM SERPL-SCNC: 3.9 MMOL/L — SIGNIFICANT CHANGE UP (ref 3.5–5.3)
PROT UR-MCNC: NEGATIVE — SIGNIFICANT CHANGE UP
RBC CASTS # UR COMP ASSIST: 2 /HPF — SIGNIFICANT CHANGE UP (ref 0–4)
SODIUM SERPL-SCNC: 127 MMOL/L — LOW (ref 135–145)
SODIUM SERPL-SCNC: 130 MMOL/L — LOW (ref 135–145)
SP GR SPEC: 1.03 — HIGH (ref 1.01–1.02)
TRIGL SERPL-MCNC: 92 MG/DL — SIGNIFICANT CHANGE UP
UROBILINOGEN FLD QL: NEGATIVE — SIGNIFICANT CHANGE UP
WBC UR QL: 1 /HPF — SIGNIFICANT CHANGE UP (ref 0–5)

## 2021-01-15 PROCEDURE — G0378: CPT

## 2021-01-15 PROCEDURE — 96372 THER/PROPH/DIAG INJ SC/IM: CPT | Mod: XU

## 2021-01-15 PROCEDURE — 71275 CT ANGIOGRAPHY CHEST: CPT | Mod: 26

## 2021-01-15 PROCEDURE — 71275 CT ANGIOGRAPHY CHEST: CPT

## 2021-01-15 PROCEDURE — 99284 EMERGENCY DEPT VISIT MOD MDM: CPT | Mod: 25

## 2021-01-15 PROCEDURE — 71045 X-RAY EXAM CHEST 1 VIEW: CPT

## 2021-01-15 PROCEDURE — 83036 HEMOGLOBIN GLYCOSYLATED A1C: CPT

## 2021-01-15 PROCEDURE — 81001 URINALYSIS AUTO W/SCOPE: CPT

## 2021-01-15 PROCEDURE — 93005 ELECTROCARDIOGRAM TRACING: CPT

## 2021-01-15 PROCEDURE — 96374 THER/PROPH/DIAG INJ IV PUSH: CPT | Mod: XU

## 2021-01-15 PROCEDURE — 99217: CPT

## 2021-01-15 PROCEDURE — 83690 ASSAY OF LIPASE: CPT

## 2021-01-15 PROCEDURE — 80053 COMPREHEN METABOLIC PANEL: CPT

## 2021-01-15 PROCEDURE — 80048 BASIC METABOLIC PNL TOTAL CA: CPT

## 2021-01-15 PROCEDURE — U0005: CPT

## 2021-01-15 PROCEDURE — 85025 COMPLETE CBC W/AUTO DIFF WBC: CPT

## 2021-01-15 PROCEDURE — 84484 ASSAY OF TROPONIN QUANT: CPT

## 2021-01-15 PROCEDURE — 96361 HYDRATE IV INFUSION ADD-ON: CPT | Mod: XU

## 2021-01-15 PROCEDURE — U0003: CPT

## 2021-01-15 PROCEDURE — 80061 LIPID PANEL: CPT

## 2021-01-15 RX ORDER — CIPROFLOXACIN LACTATE 400MG/40ML
400 VIAL (ML) INTRAVENOUS ONCE
Refills: 0 | Status: COMPLETED | OUTPATIENT
Start: 2021-01-15 | End: 2021-01-15

## 2021-01-15 RX ORDER — FAMOTIDINE 10 MG/ML
20 INJECTION INTRAVENOUS ONCE
Refills: 0 | Status: COMPLETED | OUTPATIENT
Start: 2021-01-15 | End: 2021-01-15

## 2021-01-15 RX ORDER — MOXIFLOXACIN HYDROCHLORIDE TABLETS, 400 MG 400 MG/1
1 TABLET, FILM COATED ORAL
Qty: 14 | Refills: 0
Start: 2021-01-15 | End: 2021-01-21

## 2021-01-15 RX ORDER — APIXABAN 2.5 MG/1
2 TABLET, FILM COATED ORAL
Qty: 28 | Refills: 0
Start: 2021-01-15 | End: 2021-01-21

## 2021-01-15 RX ORDER — ENOXAPARIN SODIUM 100 MG/ML
60 INJECTION SUBCUTANEOUS
Refills: 0 | Status: DISCONTINUED | OUTPATIENT
Start: 2021-01-15 | End: 2021-01-15

## 2021-01-15 RX ORDER — METRONIDAZOLE 500 MG
1 TABLET ORAL
Qty: 21 | Refills: 0
Start: 2021-01-15 | End: 2021-01-21

## 2021-01-15 RX ORDER — METRONIDAZOLE 500 MG
500 TABLET ORAL ONCE
Refills: 0 | Status: DISCONTINUED | OUTPATIENT
Start: 2021-01-15 | End: 2021-01-17

## 2021-01-15 RX ORDER — APIXABAN 2.5 MG/1
10 TABLET, FILM COATED ORAL
Refills: 0 | Status: DISCONTINUED | OUTPATIENT
Start: 2021-01-15 | End: 2021-01-17

## 2021-01-15 RX ADMIN — FAMOTIDINE 20 MILLIGRAM(S): 10 INJECTION INTRAVENOUS at 04:12

## 2021-01-15 RX ADMIN — SODIUM CHLORIDE 200 MILLILITER(S): 9 INJECTION INTRAMUSCULAR; INTRAVENOUS; SUBCUTANEOUS at 02:53

## 2021-01-15 RX ADMIN — SODIUM CHLORIDE 1000 MILLILITER(S): 9 INJECTION INTRAMUSCULAR; INTRAVENOUS; SUBCUTANEOUS at 02:52

## 2021-01-15 RX ADMIN — Medication 200 MILLIGRAM(S): at 12:06

## 2021-01-15 RX ADMIN — ENOXAPARIN SODIUM 60 MILLIGRAM(S): 100 INJECTION SUBCUTANEOUS at 11:38

## 2021-01-15 NOTE — CONSULT NOTE ADULT - SUBJECTIVE AND OBJECTIVE BOX
CHIEF COMPLAINT:      HISTORY OF PRESENT ILLNESS:  70M with PMH including HTN, HLD, COPD, and lung cancer on Keytruda p/w epigastric pain, throat pain, and hiccups for 3 days. Associated with nausea and an episode of vomiting. States he usually gets similar symptoms after his Keytruda infusions, which he received 2 days before onset of his current symptoms. Denies any other symptoms including fever, cough, chest pain, SOB, diarrhea. Spoke to his oncologist who recommended he come in for evaluation.  Found to have a small PE.   was given antibioics for enteritis which resolved his chest pain symptoms.       PAST MEDICAL & SURGICAL HISTORY:  Multiple benign nevi    Lentigines    COPD (chronic obstructive pulmonary disease)    Actinic keratosis    Lung cancer    Adenocarcinoma    Solitary pulmonary nodule    HTN (hypertension)    History of lung surgery  02/1998            MEDICATIONS:  apixaban 10 milliGRAM(s) Oral two times a day    metroNIDAZOLE  IVPB 500 milliGRAM(s) IV Intermittent once            sodium chloride 0.9%. 1000 milliLiter(s) IV Continuous <Continuous>      FAMILY HISTORY:  No pertinent family history in first degree relatives        SOCIAL HISTORY:    [ ] Non-smoker  [ ] Smoker  [ ] Alcohol    Allergies    No Known Allergies    Intolerances    	    REVIEW OF SYSTEMS:  CONSTITUTIONAL: No fever, weight loss, or fatigue  EYES: No eye pain, visual disturbances, or discharge  ENMT:  No difficulty hearing, tinnitus, vertigo; No sinus or throat pain  NECK: No pain or stiffness  RESPIRATORY: No cough, wheezing, chills or hemoptysis; + Shortness of Breath  CARDIOVASCULAR: No chest pain, palpitations, passing out, dizziness, or leg swelling  GASTROINTESTINAL: + abdominal or epigastric pain. No nausea, vomiting, or hematemesis; No diarrhea or constipation. No melena or hematochezia.  GENITOURINARY: No dysuria, frequency, hematuria, or incontinence  NEUROLOGICAL: No headaches, memory loss, loss of strength, numbness, or tremors  SKIN: No itching, burning, rashes, or lesions   LYMPH Nodes: No enlarged glands  ENDOCRINE: No heat or cold intolerance; No hair loss  MUSCULOSKELETAL: No joint pain or swelling; No muscle, back, or extremity pain  PSYCHIATRIC: No depression, anxiety, mood swings, or difficulty sleeping  HEME/LYMPH: No easy bruising, or bleeding gums  ALLERY AND IMMUNOLOGIC: No hives or eczema	    [ ] All others negative	  [ ] Unable to obtain    PHYSICAL EXAM:  T(C): 37 (01-15-21 @ 12:02), Max: 37.1 (01-14-21 @ 14:20)  HR: 89 (01-15-21 @ 12:02) (87 - 98)  BP: 130/78 (01-15-21 @ 12:02) (121/77 - 162/98)  RR: 16 (01-15-21 @ 12:02) (16 - 18)  SpO2: 93% (01-15-21 @ 12:02) (93% - 97%)  Wt(kg): --  I&O's Summary      Appearance: NAD  HEENT:   Normal oral mucosa, PERRL, EOMI	  Lymphatic: No lymphadenopathy  Cardiovascular: Normal S1 S2, No JVD, No murmurs, No edema  Respiratory: Lungs clear to auscultation	  Psychiatry: A & O x 3, Mood & affect appropriate  Gastrointestinal:  Soft, Non-tender, + BS	  Skin: No rashes, No ecchymoses, No cyanosis	  Neurologic: Non-focal  Extremities: Normal range of motion, No clubbing, cyanosis or edema  Vascular: Peripheral pulses palpable 2+ bilaterally    TELEMETRY: 	 SR ST   ECG: NSR, RBBB  	  RADIOLOGY:  < from: CT Angio Chest w/ IV Cont (01.15.21 @ 06:40) >    EXAM:  CT ANGIO CHEST (W)AW IC                            PROCEDURE DATE:  01/15/2021            INTERPRETATION:  CLINICAL INFORMATION: Chest pain    COMPARISON: CT chest 1/5/2021    PROCEDURE:  CT Angiography of the Chest.  90 ml of Omnipaque 350 was injected intravenously. 10 ml were discarded.  Sagittal and coronal reformats were performed as well as 3D (MIP) reconstructions.    FINDINGS:    LUNGS AND AIRWAYS: Small amount of secretions within the trachea and right mainstem bronchus.  The patient is status post left lower lobe wedge resection.  There is severe centrilobular emphysema.  A right upper lobe mass measuring 3.6 x 1.6 cm (2:30) is grossly stable from 3.4 x 1.5 cm and 01/05/2021.  Multiple additional lung nodules bilaterally are stable from 01/05/2021, for example measuring 9 x 8 mm in the left upper lobe (2:33), 5 x 4 mm in the left upper lobe (2:38), 4 x 3 mm in the left lower lobe (2:47), and 10 x 5 mm in the right lower lobe (2:86).  PLEURA: Small pleural effusions bilaterally.  MEDIASTINUM AND RAJEEV: Approximately 1.3 x 1.2 cm right hilar lymph node (2:50-51) is unchanged from 01/05/2021.  An 8 x 5 mm lymph node adjacent to the brachiocephalic artery origin (2:42) is unchanged.  Subcarinal lymph nodes measuring up to 6 mm short access are unchanged.  VESSELS: There is a small pulmonary embolism within the right upper lobe pulmonary artery extending into a segmental branch supplying the apical segment of the right upper lobe.  HEART: Heart size is normal. No pericardial effusion. Coronary artery calcifications.  CHEST WALL AND LOWER NECK: Within normal limits.  VISUALIZED UPPER ABDOMEN: Small hiatal hernia.  Partially visualized, there is wall thickening thickening of the proximal duodenum and within multipleleft upper quadrant small bowel loops.  There is extensive upper abdominal free fluid seen between the duodenum and pancreas and within the left upper quadrant, inferior to the pancreas, new from 01/05/2021.  There is also fluid in the right perinephric space, posterior-medial the right kidney, which is new from 01/05/2021.  BONES: Within normal limits.    IMPRESSION:  1.  Small right upper lobe pulmonary embolism.  No evidence of pulmonary infarct or right heart strain.  2.  Right upper lobe pulmonary mass and bilateral pulmonary nodules are unchanged from 01/05/2021.  3.  There is extensive wall thickening in the proximal duodenum and within left upper quadrant small bowel loops, compatible with enteritis.  Upper abdominal free fluid seen between the duodenum and pancreas and within the left upper quadrant, inferior to the pancreas, may be related to enteritis.  Superimposed pancreatitis should be excluded clinically.  4.  Free fluid in the right perinephric space, posterior-medial to the right kidney is new from 01/05/2021.  Underlying genitourinary infection/pyelonephritis should be excluded clinically.    These findings were discussed with EMA Diane at 1/15/2021 7:45 AM by Dr. Robert.        < end of copied text >  < from: 12 Lead ECG (01.14.21 @ 10:19) >  NORMAL SINUS RHYTHM  RIGHT BUNDLE BRANCH BLOCK    < end of copied text >    OTHER: 	  	  LABS:	 	    CARDIAC MARKERS:        Troponin T, High Sensitivity Result: 21: Specimen not hemolyzed                           11.9   3.00  )-----------( 311      ( 14 Jan 2021 12:21 )             33.9     01-15    130<L>  |  96  |  32<H>  ----------------------------<  93  3.9   |  22  |  1.04    Ca    8.6      15 Marc 2021 11:53    TPro  7.1  /  Alb  3.6  /  TBili  0.8  /  DBili  x   /  AST  27  /  ALT  20  /  AlkPhos  88  01-14    proBNP:   Lipid Profile:   HgA1c:   TSH:

## 2021-01-15 NOTE — ED CDU PROVIDER DISPOSITION NOTE - CARE PROVIDER_API CALL
César Rosales (DO)  Cardiology; Internal Medicine  [address] [place] [address], [address]  [place] [address]  Phone: [phone]  Fax: [phone]  Follow Up Time: 4-6 Days

## 2021-01-15 NOTE — ED CDU PROVIDER SUBSEQUENT DAY NOTE - PROGRESS NOTE DETAILS
Patient seen at bedside. VSS. No acute events on telemetry.  Patient resting comfortably, no complaints.  Will reevaluate. - Philomena Raya PA-C Patient while resting HR in the high 80s-mid 90s. When patient stands patient rapidly increases to 100-110bpm. While patient is walking HR in the 120s, as high as 130s. Patient denies chest pain, denies SOB. Rpt EKG taken after patient ambulated and appears similar to EKG taken upon initial ED evaluation, although tachycardic. Discussed case and EKGs with Dr. Lemons, given patient's medical history and tachycardia recommending CTA to r/o PE. Patient agreeable to CTA. - Philomena Raya PA-C Patient while resting HR in the high 80s-mid 90s. When patient stands patient rapidly increases to 100-110bpm. While patient is walking HR in the 120-130s, as high as 137bpm. Sinus tachycardia. Patient denies chest pain, denies SOB. Rpt EKG taken after patient ambulated and appears similar to EKG taken upon initial ED evaluation, although tachycardic. Discussed case and EKGs with Dr. Lemons, given patient's medical history and tachycardia recommending CTA to r/o PE. Patient agreeable to CTA. - Philomena Raya PA-C pt feeling better.  CTA showing small Right upper lobe PE and enteritis with free fluid.  pt has no SOB or abdo pain.  Will discuss anticoag with his onc.  pt is stable for d/c if Onc agrees. Pt seen at bedside. Pt in NAD, comfortable. VS stable from last reading. No events on tele. HR in the 90s at rest, tachycardic up to 130s with ambulation. Pt feels mild reflux sxs otherwise feels well. CTA shows R upper lobe small PE- will start on lovenox after obtaining bodyweight. Additionally showing enteritis with free fluid, fluid surrounds pancreas (denies n/v, tolerating PO, lipase wnl). Free fluid R perinephric space, UA sent to eval for UTI, though pt denies urinary complaints, no CVAT on exam. Will continue to monitor pt. d/w onc fellow who is aware of result, onc fellow will attempt to reach out to patient's outpt onc attending to discuss case. d/w onc fellow, still has not been able to reach patient's outpt provider. Fellow expressed concern about enteritis, inflammatory vs infectious vs Keytruda related. Will obtain CT abd/pelvis to determine extent of colitis. Disposition as per onc is guarded, onc fellow will continue to attempt to reach patient's outpt provider.  Pt seen at bedside. Pt in NAD, comfortable. VS stable from last reading. No events on tele. Pt has no complaints at this time. Pt updated on current plan, understands and agrees. d/w onc fellow who discussed the case with patient's oncologist. Recommending Eliquis instead of lovenox for PE. Additionally recommend cipro+flagyl for enteritis. Pt pending cardiology consult, oncology agreeable to DC if cardiology clears for DC.  Pt seen at bedside. Pt in NAD, comfortable. VS stable from last reading. No events on tele. Pt has no complaints at this time. Pt updated on current plan, understands and agrees. Pt seen at bedside. Pt in NAD, comfortable. VS stable from last reading. No events on tele. Pt has no complaints at this time. States he feels much better as compared to time of presentation. Hyponatremia improving. No evidence of respiratory distress, not hypoxic. Pt evaluated at bedside by cardiology Dr. Rosales, who clears pt for DC from cardiology perspective. d/w oncology fellow who states pt is clear for DC from onc perspective. Discussed plan with pt, pt ready and stable for DC.

## 2021-01-15 NOTE — ED CDU PROVIDER DISPOSITION NOTE - NSFOLLOWUPINSTRUCTIONS_ED_ALL_ED_FT
1. Rest. Stay hydrated.   2. Continue your current home medications.  3. Follow-up with your medical doctor in 2-3 days.  Bring your results with you for follow-up.  4. Return to the ER if you have any new or worsening symptoms, chest pain, difficulty breathing, fevers, chills, weakness, or any other concerns.    ********CARDS RECCS 1. Rest. Stay hydrated.     2. Continue your current home medications. Additionally please take Eliquis as prescribed. You were prescribed 1 week, your oncologist or PCP will need to prescribe you additional pills.    Please take ciprofloxacin and metronidazole (antibiotics) for enteritis. Finish the full course. DO NOT drink alcohol while taking these medications.    3. Follow-up with your medical doctor in 2-3 days.  Bring your results with you for follow-up.     Follow-up with your oncologist in 2-3 days.     Follow-up with cardiology, Dr. Rosales, in the office within 1 week.    César Rosales (DO)  Cardiology; Internal Medicine  [address] [place] [address], [address]  [place] [address]  Phone: [phone]  Fax: [phone]  Follow Up Time: 4-6 Days    4. Return to the ER if you have any new or worsening symptoms, chest pain, difficulty breathing, fevers, chills, weakness, or any other concerns.    ********CARDS RECCS

## 2021-01-15 NOTE — ED CDU PROVIDER SUBSEQUENT DAY NOTE - NS ED ROS FT
Constitutional: No fever or chills  Eyes: No visual changes,  HEENT: + throat pain, +hiccups   CV: No chest pain or lower extremity edema  Resp: No SOB no cough  GI: +abd pain. No nausea or vomiting. No diarrhea.  : No complaints   MSK: No musculoskeletal pain  Skin: No rash  Psych: No complaints   Neuro: No numbness or tingling.

## 2021-01-15 NOTE — ED CDU PROVIDER DISPOSITION NOTE - PATIENT PORTAL LINK FT
You can access the FollowMyHealth Patient Portal offered by [facility] by registering at the following website: [email or web address]. By joining [employer]s FollowMyHealth portal, you will also be able to view your health information using other applications (apps) compatible with our system.

## 2021-01-15 NOTE — ED CDU PROVIDER DISPOSITION NOTE - CLINICAL COURSE
70M with PMH including HTN, HLD, COPD, and lung cancer on Keytruda p/w epigastric pain, throat pain, and hiccups for 3 days. Associated with nausea and an episode of vomiting. States he usually gets similar symptoms after his Keytruda infusions, which he received 2 days before onset of his current symptoms. Denies any other symptoms including fever, cough, chest pain, SOB, diarrhea. Spoke to his oncologist who recommended he come in for evaluation. ED course: Trop 25-21. Na 123. COVID negative. CXR "Small, scattered lung masses redemonstrated. No acute consolidations." Concern for cardiac etiology. Plan for stress test, tele monitoring, and cards consult in CDU. 70M with PMH including HTN, HLD, COPD, and lung cancer on Keytruda p/w epigastric pain, throat pain, and hiccups for 3 days. Associated with nausea and an episode of vomiting. States he usually gets similar symptoms after his Keytruda infusions, which he received 2 days before onset of his current symptoms. Denies any other symptoms including fever, cough, chest pain, SOB, diarrhea. Spoke to his oncologist who recommended he come in for evaluation. ED course: Trop 25-21. Na 123. COVID negative. CXR "Small, scattered lung masses redemonstrated. No acute consolidations." Concern for cardiac etiology. Plan for stress test, tele monitoring, and cards consult in CDU.  In the CDU: HR in the 90s at rest, tachycardic up to 130s with ambulation. CTA chest performed which showed R upper lobe small PE- stress test cancelled. Additionally showing enteritis with free fluid, fluid surrounds pancreas (denies n/v, tolerating PO, lipase wnl). Free fluid R perinephric space (pt denies urinary complaints, no CVAT on exam, UA negative). d/w onc fellow who was in contact with patient's oncologist. Pt was started on Eliquis for PE, cipro+Flagyl for enteritis. pt reported improvement of reflux sxs after starting abx. Pt not hypoxic and not tachypneic. Pt evaluated by cardiologist Dr Rosales in the CDU who recommended DC with outpt TTE. Pt cleared for DC by onc. Pt ready and stable at time of DC.

## 2021-01-15 NOTE — ED CDU PROVIDER SUBSEQUENT DAY NOTE - MEDICAL DECISION MAKING DETAILS
Pt with lung CA and cp - CT Pulm Angio, reassess.  Stress Test no longer indicated as etiology identified.

## 2021-01-15 NOTE — CONSULT NOTE ADULT - ASSESSMENT
70M with PMH including HTN, HLD, COPD, and lung cancer on Keytruda p/w epigastric pain, throat pain, and hiccups for 3 days. Associated with nausea and an episode of vomiting. States he usually gets similar symptoms after his Keytruda infusions, which he received 2 days before onset of his current symptoms. Denies any other symptoms including fever, cough, chest pain, SOB, diarrhea. Spoke to his oncologist who recommended he come in for evaluation.  Found to have a small PE.   was given antibioics for enteritis which resolved his chest pain symptoms.   
I will START or STAY ON the medications listed below when I get home from the hospital:    Aspir 81 81 mg oral delayed release tablet  -- 1 tab(s) by mouth once a day MDD:1  -- Swallow whole.  Do not crush.  Take with food or milk.    -- Indication: For Home med     acetaminophen 325 mg oral tablet  -- 2 tab(s) by mouth every 6 hours, As needed, Mild Pain (1 - 3)  -- Indication: For prn pain    oxyCODONE 5 mg oral tablet  -- 1 tab(s) by mouth every 4 hours, As needed, Moderate Pain (4 - 6) MDD:6 tabs  -- Indication: For prn pain    lisinopril 10 mg oral tablet  -- 1 tab(s) by mouth once a day  -- Indication: For HTN    glipiZIDE 5 mg oral tablet  -- 1 tab(s) by mouth 2 times a day  -- Indication: For DM    metFORMIN 1000 mg oral tablet  -- 1 tab(s) by mouth 2 times a day  -- Indication: For DM    Tresiba FlexTouch 100 units/mL subcutaneous solution  -- 100 unit(s) subcutaneous once a day  -- Indication: For DM    atorvastatin 80 mg oral tablet  -- 1 tab(s) by mouth once a day  -- Indication: For Home med     carvedilol 6.25 mg oral tablet  -- 1 tab(s) by mouth every 12 hours  -- Indication: For Home med     docusate sodium 100 mg oral capsule  -- 1 cap(s) by mouth 3 times a day  -- Indication: For Home med

## 2021-01-19 NOTE — ED POST DISCHARGE NOTE - ADDITIONAL DOCUMENTATION
Pt called to clarify which medication prescription needed to be renewed, I reminded pt that the Eliquis will need to be prescribed by his oncologist and the dose will change after the first week. Pt asking about antacid use for GERD he has after Keytruda, advised pt he was given calcium carbonate and pepcid n the ED which can be purchased OTC, also advised discussing with his oncologist since pt related GERD to his Keytruda. Pt reports being seen by cardiology outpt as directed. All questions answered. -Axel Diane PA-C

## 2021-01-20 ENCOUNTER — FORM ENCOUNTER (OUTPATIENT)
Age: 71
End: 2021-01-20

## 2021-01-22 ENCOUNTER — RESULT REVIEW (OUTPATIENT)
Age: 71
End: 2021-01-22

## 2021-01-22 ENCOUNTER — APPOINTMENT (OUTPATIENT)
Dept: CARDIOLOGY | Facility: CLINIC | Age: 71
End: 2021-01-22
Payer: COMMERCIAL

## 2021-01-22 VITALS
HEART RATE: 102 BPM | TEMPERATURE: 97.3 F | WEIGHT: 131.17 LBS | RESPIRATION RATE: 16 BRPM | SYSTOLIC BLOOD PRESSURE: 115 MMHG | BODY MASS INDEX: 18.78 KG/M2 | OXYGEN SATURATION: 100 % | DIASTOLIC BLOOD PRESSURE: 76 MMHG | HEIGHT: 70 IN

## 2021-01-22 DIAGNOSIS — D48.9 NEOPLASM OF UNCERTAIN BEHAVIOR, UNSPECIFIED: ICD-10-CM

## 2021-01-22 LAB
BASOPHILS # BLD AUTO: 0 K/UL — SIGNIFICANT CHANGE UP (ref 0–0.2)
BASOPHILS NFR BLD AUTO: 0 % — SIGNIFICANT CHANGE UP (ref 0–2)
EOSINOPHIL # BLD AUTO: 0 K/UL — SIGNIFICANT CHANGE UP (ref 0–0.5)
EOSINOPHIL NFR BLD AUTO: 0 % — SIGNIFICANT CHANGE UP (ref 0–6)
HCT VFR BLD CALC: 28.4 % — LOW (ref 39–50)
HGB BLD-MCNC: 9.5 G/DL — LOW (ref 13–17)
LYMPHOCYTES # BLD AUTO: 0.45 K/UL — LOW (ref 1–3.3)
LYMPHOCYTES # BLD AUTO: 7 % — LOW (ref 13–44)
MCHC RBC-ENTMCNC: 33 PG — SIGNIFICANT CHANGE UP (ref 27–34)
MCHC RBC-ENTMCNC: 33.5 G/DL — SIGNIFICANT CHANGE UP (ref 32–36)
MCV RBC AUTO: 98.6 FL — SIGNIFICANT CHANGE UP (ref 80–100)
MONOCYTES # BLD AUTO: 1.15 K/UL — HIGH (ref 0–0.9)
MONOCYTES NFR BLD AUTO: 18 % — HIGH (ref 2–14)
NEUTROPHILS # BLD AUTO: 4.8 K/UL — SIGNIFICANT CHANGE UP (ref 1.8–7.4)
NEUTROPHILS NFR BLD AUTO: 75 % — SIGNIFICANT CHANGE UP (ref 43–77)
NRBC # BLD: 0 /100 — SIGNIFICANT CHANGE UP (ref 0–0)
NRBC # BLD: SIGNIFICANT CHANGE UP /100 WBCS (ref 0–0)
PLAT MORPH BLD: NORMAL — SIGNIFICANT CHANGE UP
PLATELET # BLD AUTO: 169 K/UL — SIGNIFICANT CHANGE UP (ref 150–400)
RBC # BLD: 2.88 M/UL — LOW (ref 4.2–5.8)
RBC # FLD: 17.2 % — HIGH (ref 10.3–14.5)
RBC BLD AUTO: SIGNIFICANT CHANGE UP
WBC # BLD: 6.4 K/UL — SIGNIFICANT CHANGE UP (ref 3.8–10.5)
WBC # FLD AUTO: 6.4 K/UL — SIGNIFICANT CHANGE UP (ref 3.8–10.5)

## 2021-01-22 PROCEDURE — 93000 ELECTROCARDIOGRAM COMPLETE: CPT

## 2021-01-22 PROCEDURE — 99204 OFFICE O/P NEW MOD 45 MIN: CPT

## 2021-01-22 NOTE — PHYSICAL EXAM
[General Appearance - Well Developed] : well developed [General Appearance - In No Acute Distress] : no acute distress [Normal Conjunctiva] : the conjunctiva exhibited no abnormalities [Eyelids - No Xanthelasma] : the eyelids demonstrated no xanthelasmas [Normal Oropharynx] : normal oropharynx [Heart Sounds] : normal S1 and S2 [Heart Rate And Rhythm] : heart rate and rhythm were normal [Murmurs] : no murmurs present [Arterial Pulses Normal] : the arterial pulses were normal [Respiration, Rhythm And Depth] : normal respiratory rhythm and effort [Exaggerated Use Of Accessory Muscles For Inspiration] : no accessory muscle use [Auscultation Breath Sounds / Voice Sounds] : lungs were clear to auscultation bilaterally [Abdomen Tenderness] : non-tender [Abnormal Walk] : normal gait [Gait - Sufficient For Exercise Testing] : the gait was sufficient for exercise testing [Cyanosis, Localized] : no localized cyanosis [Skin Color & Pigmentation] : normal skin color and pigmentation [Skin Turgor] : normal skin turgor [Oriented To Time, Place, And Person] : oriented to person, place, and time [Affect] : the affect was normal [Mood] : the mood was normal [FreeTextEntry1] : small ecchymotic lesion on right arm

## 2021-01-22 NOTE — HISTORY OF PRESENT ILLNESS
[FreeTextEntry1] : 70 year old man with 911 exposure (), former smoker, hypertension, metastatic lung adenoCA being treated with carbo/pemetrexed and pembrolizumab x4 (last dose 1/8/21) who is seen for cardio-oncology evaluation. At last oncology visit 1/14 reported chest discomfort and labile blood pressure with hypertension at home, noted to have new TWI on his ECG and sent to ER for workup. In ER Hs Annika was 27-25-21 ng/L (negative), BNP 5716 (elevated). ECG RBBB, chronic. CT-Angiogram showed new segmental PE, started on anticoagulation with enoxaparin and transitioned to apixaban. An echocardiogram was not performed.\par \par Also noted on the CT were coronary artery calcification and signs of colitis. He was prescribed a course of cipro/flagyl for this by the ER, which he feels did nothing. He denies any chest pain or new shortness of breath. He denies any dizziness, palpitations, syncope, or near syncope. He does complain of difficulty swallowing liquids , worsening following each administration of the PD-1 inhibitor. Has appointment with GI in a few weeks.\par \par He denies any history of heart disease but has right bundle branch block on the ECG, which is chronic. Work as a  causes him moderate stress, and he smoked until cancer diagnosis.\par \par He tells me he takes amlodipine for HTN, but is no longer taking HCTZ or losartan. No recent stress test.\par \par Compliant with apixaban, notes some ecchymoses. \par \par \par \par \par

## 2021-01-22 NOTE — ASSESSMENT
[FreeTextEntry1] : 70 year old man with lung cancer on carbo/pemetrexed with pembrolizumab x4 (last 1/8), CAD risk factors including smoking, 9-11 exposure, immunotherapy, hypertension with coronary calcifications on CT scan.\par \par Dysphagia, colitis on CT, likely low grade reaction to immunotherapy. Transient ECG changes and detectable troponin and elevated pro-BNP may be explained by hypertension and/or acute PE, but require close monitoring and further evaluation for possible low grade myocarditis or other treatment related cardiac disease.\par \par Will obtain expedited echocardiogram with GLS.\par Check troponin and BNP today\par Needs aggressive CAD risk factor modification given CACs and risk factor profile, particularly given recent data demonstrating atherosclerosis progression following immunotherapy. I discussed this at length with the patient today. Will check lipids, A1c, CMP. To refer for stress test pending risk stratification above. Patient on antithrombotic therapy.\par \par Blood pressure is normal on assessment today. Continue amlodipine 10 mg daily. At some point he should be referred for AAA screening.\par \par For recent acute PE, will continue apixaban. He is running out of medication, so I provided a new prescription for apixaban 5 mg BID today. Will check echo given and cardiac biomarkers as above.\par \par Needs close follow up to ensure CVD risk factors are addressed.\par \par

## 2021-01-22 NOTE — REASON FOR VISIT
[Follow-Up - From Hospitalization] : follow-up of a recent hospitalization for [FreeTextEntry2] : pulmonary embolism

## 2021-01-24 LAB
ALBUMIN SERPL ELPH-MCNC: 3.7 G/DL
ALP BLD-CCNC: 71 U/L
ALT SERPL-CCNC: 10 U/L
ANION GAP SERPL CALC-SCNC: 21 MMOL/L
AST SERPL-CCNC: 20 U/L
BILIRUB SERPL-MCNC: 0.3 MG/DL
BUN SERPL-MCNC: 16 MG/DL
CALCIUM SERPL-MCNC: 8.7 MG/DL
CHLORIDE SERPL-SCNC: 101 MMOL/L
CHOLEST SERPL-MCNC: 136 MG/DL
CO2 SERPL-SCNC: 18 MMOL/L
CREAT SERPL-MCNC: 1.25 MG/DL
GLUCOSE SERPL-MCNC: 112 MG/DL
HDLC SERPL-MCNC: 47 MG/DL
LDLC SERPL CALC-MCNC: 71 MG/DL
NONHDLC SERPL-MCNC: 89 MG/DL
NT-PROBNP SERPL-MCNC: 1006 PG/ML
POTASSIUM SERPL-SCNC: 3.6 MMOL/L
PROT SERPL-MCNC: 6.3 G/DL
SODIUM SERPL-SCNC: 140 MMOL/L
TRIGL SERPL-MCNC: 88 MG/DL

## 2021-01-24 RX ORDER — HYDROCHLOROTHIAZIDE 25 MG/1
25 TABLET ORAL DAILY
Qty: 30 | Refills: 0 | Status: DISCONTINUED | COMMUNITY
Start: 2021-01-13 | End: 2021-01-24

## 2021-01-24 RX ORDER — LOSARTAN POTASSIUM 25 MG/1
25 TABLET, FILM COATED ORAL DAILY
Qty: 1 | Refills: 5 | Status: DISCONTINUED | COMMUNITY
Start: 2020-09-02 | End: 2021-01-24

## 2021-01-26 ENCOUNTER — FORM ENCOUNTER (OUTPATIENT)
Age: 71
End: 2021-01-26

## 2021-01-29 ENCOUNTER — RESULT REVIEW (OUTPATIENT)
Age: 71
End: 2021-01-29

## 2021-01-29 ENCOUNTER — LABORATORY RESULT (OUTPATIENT)
Age: 71
End: 2021-01-29

## 2021-01-29 ENCOUNTER — APPOINTMENT (OUTPATIENT)
Dept: INFUSION THERAPY | Facility: HOSPITAL | Age: 71
End: 2021-01-29

## 2021-01-29 ENCOUNTER — APPOINTMENT (OUTPATIENT)
Dept: HEMATOLOGY ONCOLOGY | Facility: CLINIC | Age: 71
End: 2021-01-29
Payer: COMMERCIAL

## 2021-01-29 ENCOUNTER — APPOINTMENT (OUTPATIENT)
Dept: HEMATOLOGY ONCOLOGY | Facility: CLINIC | Age: 71
End: 2021-01-29

## 2021-01-29 LAB
BASOPHILS # BLD AUTO: 0.02 K/UL — SIGNIFICANT CHANGE UP (ref 0–0.2)
BASOPHILS NFR BLD AUTO: 0.4 % — SIGNIFICANT CHANGE UP (ref 0–2)
EOSINOPHIL # BLD AUTO: 0.03 K/UL — SIGNIFICANT CHANGE UP (ref 0–0.5)
EOSINOPHIL NFR BLD AUTO: 0.6 % — SIGNIFICANT CHANGE UP (ref 0–6)
HCT VFR BLD CALC: 26.5 % — LOW (ref 39–50)
HGB BLD-MCNC: 8.9 G/DL — LOW (ref 13–17)
IMM GRANULOCYTES NFR BLD AUTO: 1.1 % — SIGNIFICANT CHANGE UP (ref 0–1.5)
LYMPHOCYTES # BLD AUTO: 1.01 K/UL — SIGNIFICANT CHANGE UP (ref 1–3.3)
LYMPHOCYTES # BLD AUTO: 18.6 % — SIGNIFICANT CHANGE UP (ref 13–44)
MCHC RBC-ENTMCNC: 33.3 PG — SIGNIFICANT CHANGE UP (ref 27–34)
MCHC RBC-ENTMCNC: 33.6 G/DL — SIGNIFICANT CHANGE UP (ref 32–36)
MCV RBC AUTO: 99.3 FL — SIGNIFICANT CHANGE UP (ref 80–100)
MONOCYTES # BLD AUTO: 0.75 K/UL — SIGNIFICANT CHANGE UP (ref 0–0.9)
MONOCYTES NFR BLD AUTO: 13.8 % — SIGNIFICANT CHANGE UP (ref 2–14)
NEUTROPHILS # BLD AUTO: 3.57 K/UL — SIGNIFICANT CHANGE UP (ref 1.8–7.4)
NEUTROPHILS NFR BLD AUTO: 65.5 % — SIGNIFICANT CHANGE UP (ref 43–77)
NRBC # BLD: 0 /100 WBCS — SIGNIFICANT CHANGE UP (ref 0–0)
PLATELET # BLD AUTO: 444 K/UL — HIGH (ref 150–400)
RBC # BLD: 2.67 M/UL — LOW (ref 4.2–5.8)
RBC # FLD: 17.2 % — HIGH (ref 10.3–14.5)
WBC # BLD: 5.44 K/UL — SIGNIFICANT CHANGE UP (ref 3.8–10.5)
WBC # FLD AUTO: 5.44 K/UL — SIGNIFICANT CHANGE UP (ref 3.8–10.5)

## 2021-01-29 PROCEDURE — 99215 OFFICE O/P EST HI 40 MIN: CPT

## 2021-01-29 NOTE — HISTORY OF PRESENT ILLNESS
[Disease: _____________________] : Disease: [unfilled] [M: ___] : M[unfilled] [AJCC Stage: ____] : AJCC Stage: [unfilled] [de-identified] : 71 yo M with tobacco smoking hx and 911 exposure ( being followed at [facility],  was diagnosed with COPD/emphysema (dx soon after 9/11), had screening CT in 2019 which showed a lung mass. Work up at [facility]- solitary mass which was resected (benign-organizing PNA). He was referred to pulm grant a year ago by [facility] program but he made an appointment this year with Dr. Pritchard. He referred him for screening CT in Feb which was done in August and showed right upper lobe 3.7 x 3.8 cm mass with interval increase in size since January 7, 2019 worrisome for neoplasm possibly a primary lung neoplasm. Multiple bilateral smaller nodular opacities are new since January 7, 2019 may represent metastatic disease. Enlarged anterior mediastinal lymph node worrisome for metastatic disease. PET/CT 8/25/2020 IMPRESSION: Since outside PET/CT 1/30/2019: Considerable interval increased size and FDG avidity of a now spiculated right upper lobe mass with central necrosis suspicious for primary lung malignancy. New FDG avid enlarged mediastinal lymph nodes and bilateral pulmonary nodules probably metastatic disease. Indeterminate FDG avid left intraparotid focus, not well evaluated secondary to misregistration artifact, however appears new since prior PET/CT, possibly metastatic. He underwent CT guided bx by Dr. Barraza at [facility] which was suggestive of malignancy. He subsequently underwent bronch and EBUS/bx of level 4 LN which was c/w adeno ca, lung primary. He is still doing well from a symptomatic standpoint. He has lost some weight which he usually does every Summer. He denies fevers, chills, dyspnea, chest pain/tightness, cough, wheeze. Continues to work on a full-time basis as a . \par 10/27/20: pt has no new complaints. He had Ct scans and MRi of brain interval enlargement of multiple bilateral pulmonary masses and mediastinal adenopathy consistent with progressive disease. CT abdomen and MRI brain revealed no mets. \par \par 11/6/2020: Pt presents prior to starting treatment today to review regimen, address any concerns and provide written consent. He offers no new complaints. \par \par 11/27/2020: Pt returns for follow up. S/p first cycle. Tolerating well . NOT SMOKING!!!!! Doing well. Had one episode of near syncope (vaso-vagal). Pt states he was very dehydrated. No further episodes since. Pt states he "feels great"\par \par 12/18/2020: Pt returns for follow up. Tolerating treatment wo any AE. Only reports mild occasional fatigue. Scheduled for C#3 today\par \par 1/8/2021: Pt returns for follow up and discussion of findings on recent imaging. He is feeling well. Appetite good. Occasional fatigue. States he is losing his hair and is very unhappy about it. \par \par 1/14/21: Patient came to clinic for follow up today, he is s/p C4 Carbo/Alimta, Keytruda on 1/8/21, he reports worsening fatigue, poor appetite but stable weight, also reports  for past 2-3 days and  today, reports burning sensation feeling in chest that radiates to his throat and  nausea. Denies sob and skin rash. He did not take any BP meds today as his BP was in 100s He also experiences constipation. ECG was done in the office and showed some flip T waves in anterior leads. Plan to send him to [facility] to evaluate.\par \par 1/29/21: Pt had a prolonged recovery period after last treatment. Nausea, dysphagia, weight loss, and chest pain. As noted above, pt was hospitalized. No acute etiology was found and he was discharged home., He feels better now. Started eating again. CT scan in the hospital on 1/14 revealed  \par 1. Small right upper lobe pulmonary embolism. No evidence of pulmonary infarct or right heart strain.\par 2. Right upper lobe pulmonary mass and bilateral pulmonary nodules are unchanged from 01/05/2021.\par 3. There is extensive wall thickening in the proximal duodenum and within left upper quadrant small bowel loops, compatible with enteritis. Upper abdominal free fluid seen between the duodenum and pancreas and within the left upper quadrant, inferior to the pancreas, may be related to enteritis. Superimposed pancreatitis should be excluded clinically.\par 4. Free fluid in the right perinephric space, posterior-medial to the right kidney is new from 01/05/2021. Underlying genitourinary infection/pyelonephritis should be excluded clinically.\par \par He has lost a lot of weight since last visit. \par  [de-identified] : adeno ca [de-identified] : PDL1 80%

## 2021-01-29 NOTE — ASSESSMENT
[FreeTextEntry1] : 71 yo with newly diagnosed NSCLC, at least stage IIIB, maybe stage IV if contralateral nodules are malignant\par Reviewed brain MRI and CT scans- overall POD with clear progression of contralateral nodules confirming stage IV disease. MRI brain and CT abd reveal no mets\par Discussed at TB- no role for bx of contralateral nodule- PET/CT and recent CT scans are convincing for metastatic involvement of left lung. \par PDL1 was 80% but tumor NGS could not be performed because of QNS\par Blood NGS through [facility] revealed- HRAS and TP53 mutations. \par Pt started with carbo/alimta/pembro as front line option. Completed 4 cycles wo AE. \par Continue folic acid. B12 every other treatment \par We had planned for 6 cycles and then drop carbo and continue with alimta/keytruda maintenance.\par However, in ight of recent events- will hold off on chemo and proceed with Keytruda alone. \par Explained this to the pt and he understands.\par Pt knows to call if he has any symptoms\par If diarrhea recurs, pt may have irAE (colitis/enteritis) in which case, he will have to be started on steroids and referred to GI.  [Palliative] : Goals of care discussed with patient: Palliative

## 2021-01-29 NOTE — REVIEW OF SYSTEMS
[Fatigue] : fatigue [Recent Change In Weight] : ~T no recent weight change [Chest Pain] : chest pain [SOB on Exertion] : shortness of breath during exertion [Negative] : Allergic/Immunologic

## 2021-01-31 ENCOUNTER — FORM ENCOUNTER (OUTPATIENT)
Age: 71
End: 2021-01-31

## 2021-02-01 ENCOUNTER — FORM ENCOUNTER (OUTPATIENT)
Age: 71
End: 2021-02-01

## 2021-02-02 ENCOUNTER — NON-APPOINTMENT (OUTPATIENT)
Age: 71
End: 2021-02-02

## 2021-02-03 ENCOUNTER — FORM ENCOUNTER (OUTPATIENT)
Age: 71
End: 2021-02-03

## 2021-02-03 ENCOUNTER — APPOINTMENT (OUTPATIENT)
Dept: GASTROENTEROLOGY | Facility: CLINIC | Age: 71
End: 2021-02-03
Payer: COMMERCIAL

## 2021-02-03 VITALS
SYSTOLIC BLOOD PRESSURE: 120 MMHG | TEMPERATURE: 97.7 F | WEIGHT: 126 LBS | HEIGHT: 70 IN | OXYGEN SATURATION: 99 % | HEART RATE: 93 BPM | BODY MASS INDEX: 18.04 KG/M2 | DIASTOLIC BLOOD PRESSURE: 77 MMHG

## 2021-02-03 DIAGNOSIS — Z12.11 ENCOUNTER FOR SCREENING FOR MALIGNANT NEOPLASM OF COLON: ICD-10-CM

## 2021-02-03 PROCEDURE — 99204 OFFICE O/P NEW MOD 45 MIN: CPT

## 2021-02-03 NOTE — REVIEW OF SYSTEMS
[As Noted in HPI] : as noted in HPI [Vomiting] : vomiting [Heartburn] : heartburn [Negative] : Heme/Lymph [Abdominal Pain] : no abdominal pain [Constipation] : no constipation [Diarrhea] : no diarrhea [Melena] : no melena [FreeTextEntry4] : Dysphagia [FreeTextEntry7] : One episode of nausea and vomiting

## 2021-02-03 NOTE — PHYSICAL EXAM
[General Appearance - Alert] : alert [General Appearance - In No Acute Distress] : in no acute distress [Sclera] : the sclera and conjunctiva were normal [PERRL With Normal Accommodation] : pupils were equal in size, round, and reactive to light [Extraocular Movements] : extraocular movements were intact [Outer Ear] : the ears and nose were normal in appearance [Oropharynx] : the oropharynx was normal [Neck Appearance] : the appearance of the neck was normal [Jugular Venous Distention Increased] : there was no jugular-venous distention [Neck Cervical Mass (___cm)] : no neck mass was observed [Thyroid Diffuse Enlargement] : the thyroid was not enlarged [Thyroid Nodule] : there were no palpable thyroid nodules [] : no respiratory distress [Auscultation Breath Sounds / Voice Sounds] : lungs were clear to auscultation bilaterally [Heart Rate And Rhythm] : heart rate was normal and rhythm regular [Heart Sounds] : normal S1 and S2 [Heart Sounds Gallop] : no gallops [Murmurs] : no murmurs [Heart Sounds Pericardial Friction Rub] : no pericardial rub [Flat] : flat [Soft, Nontender] : the abdomen was soft and nontender [Normal] : normal to percussion [Cervical Lymph Nodes Enlarged Posterior Bilaterally] : posterior cervical [Cervical Lymph Nodes Enlarged Anterior Bilaterally] : anterior cervical [Supraclavicular Lymph Nodes Enlarged Bilaterally] : supraclavicular [Axillary Lymph Nodes Enlarged Bilaterally] : axillary [Femoral Lymph Nodes Enlarged Bilaterally] : femoral [Inguinal Lymph Nodes Enlarged Bilaterally] : inguinal [No CVA Tenderness] : no ~M costovertebral angle tenderness [No Spinal Tenderness] : no spinal tenderness [Abnormal Walk] : normal gait [Nail Clubbing] : no clubbing  or cyanosis of the fingernails [Musculoskeletal - Swelling] : no joint swelling seen [Motor Tone] : muscle strength and tone were normal [Deep Tendon Reflexes (DTR)] : deep tendon reflexes were 2+ and symmetric [Sensation] : the sensory exam was normal to light touch and pinprick [No Focal Deficits] : no focal deficits [Impaired Insight] : insight and judgment were intact [Oriented To Time, Place, And Person] : oriented to person, place, and time [Affect] : the affect was normal [Firm] : not firm [Rigid] : not rigid [Rebound] : no rebound [Guarding] : no guarding [Scherer's] : a negative Scherer's sign

## 2021-02-03 NOTE — HISTORY OF PRESENT ILLNESS
[Heartburn] : denies heartburn [Nausea] : denies nausea [Vomiting] : denies vomiting [Diarrhea] : denies diarrhea [Constipation] : denies constipation [Yellow Skin Or Eyes (Jaundice)] : denies jaundice [Abdominal Pain] : denies abdominal pain [Abdominal Swelling] : denies abdominal swelling [Rectal Pain] : denies rectal pain [Diverticulitis] : diverticulitis [Malignancy] : malignancy [GERD] : no gastroesophageal reflux disease [Hiatus Hernia] : no hiatus hernia [Peptic Ulcer Disease] : no peptic ulcer disease [Pancreatitis] : no pancreatitis [Cholelithiasis] : no cholelithiasis [Kidney Stone] : no kidney stone [Inflammatory Bowel Disease] : no inflammatory bowel disease [Irritable Bowel Syndrome] : no irritable bowel syndrome [Alcohol Abuse] : no alcohol abuse [Abdominal Surgery] : no abdominal surgery [Appendectomy] : no appendectomy [Cholecystectomy] : no cholecystectomy [de-identified] : 70 year old male presents with dysphagia ongoing since Sept the start of his immune therapy for  Lung Cancer Stage 4 diagnoses in 2019. Patient states episodes last for 1 -3 weeks each time. He is unable to swallow solids or liquids including water when episode occurs. Patient states that this is specifically noticeable after his immune therapy treatment. Patient with PMH including HTN, HLD, COPD, and lung cancer on Keytruda p/w epigastric pain, throat pain, and hiccups for 3 days.Associated with nausea and an episode of vomiting. States he usually gets\par similar symptoms after his Keytruda infusions, which he received 2 days before\par onset of his current symptoms. Denies any other symptoms including fever,\par cough, chest pain, SOB, diarrhea. CT Scan of the Chest done on 1/14/21 at [facility] noted Pulmonary Embolism. Was started on Eliquis in ED advised to follow up with Pulmonary doctor. Patient states he has not followed up with a Pulmonary doctor yet but is being followed by Dr Pritchard and will make an appt ASAP for follow up since starting Eliquis.. Denies rectal bleeding, blood in the stool, melena, or hematemesis. Patient also states he has never had a screening colonoscopy.  [de-identified] : Lung Cancer Dx 1 year ago

## 2021-02-03 NOTE — ASSESSMENT
[FreeTextEntry1] : 1- Dysphagia \par \par A low acid / reflux diet was discussed in great detail including not smoking, not drinking alcohol, and not\par consuming foods that irritate the esophagus. It is helpful to eat small meals throughout the day instead\par of large meals. You should avoid eating before bedtime or lying down after you eat. It can be helpful to\par raise the head of your bed six inches. Additionally, you should maintain a healthy weight and good\par posture. The patient was given written material to take home and review. \par \par \par Ordered esophagram. The risks benefits alternatives and complications of the test/s were explained to the patient at length. The patient was agreeable and we will proceed. \par \par Patient instructed to continue to take Pantoprazole 40 mg PO as prescribed. Medication reviewed side effects and adverse reactions. \par \par \par 2- Screening for Colon Cancer \par \par Patient due for Colon Cancer screening never has a colonoscopy or Cologuard testing performed. As per MD Brunner request patient with Lung Cancer Stage 4 currently on Eliquis recently started 2 weeks ago for Pulmonary Embolism should have Cologuard testing initially. \par Cologuard testing discussed at length the risks and benefits of test and rationale for having this testing done. \par \par \par Patient will follow up with his Pulmonologist as per ED recommendations since being started on Eliquis by Emergency Department provider. \par \par Patient verbalized understanding of all information provided. All questions answered and reviewed.\par \par \par MD. Brunner present for this office visit and agrees with my plan of care for the patient.\par \par

## 2021-02-04 ENCOUNTER — APPOINTMENT (OUTPATIENT)
Dept: INFUSION THERAPY | Facility: HOSPITAL | Age: 71
End: 2021-02-04

## 2021-02-04 ENCOUNTER — LABORATORY RESULT (OUTPATIENT)
Age: 71
End: 2021-02-04

## 2021-02-04 ENCOUNTER — RESULT REVIEW (OUTPATIENT)
Age: 71
End: 2021-02-04

## 2021-02-04 ENCOUNTER — FORM ENCOUNTER (OUTPATIENT)
Age: 71
End: 2021-02-04

## 2021-02-04 LAB
BASOPHILS # BLD AUTO: 0.04 K/UL — SIGNIFICANT CHANGE UP (ref 0–0.2)
BASOPHILS NFR BLD AUTO: 0.8 % — SIGNIFICANT CHANGE UP (ref 0–2)
EOSINOPHIL # BLD AUTO: 0.04 K/UL — SIGNIFICANT CHANGE UP (ref 0–0.5)
EOSINOPHIL NFR BLD AUTO: 0.8 % — SIGNIFICANT CHANGE UP (ref 0–6)
HCT VFR BLD CALC: 32.6 % — LOW (ref 39–50)
HGB BLD-MCNC: 10.5 G/DL — LOW (ref 13–17)
IMM GRANULOCYTES NFR BLD AUTO: 1.2 % — SIGNIFICANT CHANGE UP (ref 0–1.5)
LYMPHOCYTES # BLD AUTO: 1.28 K/UL — SIGNIFICANT CHANGE UP (ref 1–3.3)
LYMPHOCYTES # BLD AUTO: 25.2 % — SIGNIFICANT CHANGE UP (ref 13–44)
MCHC RBC-ENTMCNC: 32.2 G/DL — SIGNIFICANT CHANGE UP (ref 32–36)
MCHC RBC-ENTMCNC: 32.7 PG — SIGNIFICANT CHANGE UP (ref 27–34)
MCV RBC AUTO: 101.6 FL — HIGH (ref 80–100)
MONOCYTES # BLD AUTO: 0.52 K/UL — SIGNIFICANT CHANGE UP (ref 0–0.9)
MONOCYTES NFR BLD AUTO: 10.2 % — SIGNIFICANT CHANGE UP (ref 2–14)
NEUTROPHILS # BLD AUTO: 3.14 K/UL — SIGNIFICANT CHANGE UP (ref 1.8–7.4)
NEUTROPHILS NFR BLD AUTO: 61.8 % — SIGNIFICANT CHANGE UP (ref 43–77)
NRBC # BLD: 0 /100 WBCS — SIGNIFICANT CHANGE UP (ref 0–0)
PLATELET # BLD AUTO: 511 K/UL — HIGH (ref 150–400)
RBC # BLD: 3.21 M/UL — LOW (ref 4.2–5.8)
RBC # FLD: 17.2 % — HIGH (ref 10.3–14.5)
WBC # BLD: 5.08 K/UL — SIGNIFICANT CHANGE UP (ref 3.8–10.5)
WBC # FLD AUTO: 5.08 K/UL — SIGNIFICANT CHANGE UP (ref 3.8–10.5)

## 2021-02-04 PROCEDURE — 74160 CT ABDOMEN W/CONTRAST: CPT

## 2021-02-04 PROCEDURE — 71260 CT THORAX DX C+: CPT

## 2021-02-04 PROCEDURE — 86901 BLOOD TYPING SEROLOGIC RH(D): CPT

## 2021-02-04 PROCEDURE — 86900 BLOOD TYPING SEROLOGIC ABO: CPT

## 2021-02-04 PROCEDURE — 86850 RBC ANTIBODY SCREEN: CPT

## 2021-02-05 ENCOUNTER — APPOINTMENT (OUTPATIENT)
Dept: PULMONOLOGY | Facility: CLINIC | Age: 71
End: 2021-02-05
Payer: COMMERCIAL

## 2021-02-05 VITALS
DIASTOLIC BLOOD PRESSURE: 77 MMHG | HEART RATE: 98 BPM | WEIGHT: 128 LBS | BODY MASS INDEX: 18.32 KG/M2 | RESPIRATION RATE: 15 BRPM | TEMPERATURE: 98 F | OXYGEN SATURATION: 96 % | HEIGHT: 70 IN | SYSTOLIC BLOOD PRESSURE: 124 MMHG

## 2021-02-05 DIAGNOSIS — E86.0 DEHYDRATION: ICD-10-CM

## 2021-02-05 PROCEDURE — 99214 OFFICE O/P EST MOD 30 MIN: CPT

## 2021-02-05 NOTE — ASSESSMENT
[FreeTextEntry1] : 1. COPD: Doing well on trelegy. Meds renewed\par \par 2. LUng ca\par \par 3. Dysphagia\par \par 4. PE: Now on eliquis.

## 2021-02-05 NOTE — HISTORY OF PRESENT ILLNESS
[TextBox_4] : Patient recently hospitalized, had small PE discovered. Now on eliquis\par \par States breathing is good. Using trelegy. Needs refill. \par \par Notes dyspagia after immunotherapy and weight loss.

## 2021-02-07 ENCOUNTER — FORM ENCOUNTER (OUTPATIENT)
Age: 71
End: 2021-02-07

## 2021-02-08 ENCOUNTER — NON-APPOINTMENT (OUTPATIENT)
Age: 71
End: 2021-02-08

## 2021-02-08 ENCOUNTER — OUTPATIENT (OUTPATIENT)
Dept: OUTPATIENT SERVICES | Facility: HOSPITAL | Age: 71
LOS: 1 days | End: 2021-02-08

## 2021-02-08 ENCOUNTER — APPOINTMENT (OUTPATIENT)
Dept: CV DIAGNOSITCS | Facility: HOSPITAL | Age: 71
End: 2021-02-08
Payer: COMMERCIAL

## 2021-02-08 ENCOUNTER — APPOINTMENT (OUTPATIENT)
Dept: CARDIOLOGY | Facility: CLINIC | Age: 71
End: 2021-02-08
Payer: COMMERCIAL

## 2021-02-08 VITALS — SYSTOLIC BLOOD PRESSURE: 128 MMHG | DIASTOLIC BLOOD PRESSURE: 80 MMHG

## 2021-02-08 VITALS
HEIGHT: 70 IN | DIASTOLIC BLOOD PRESSURE: 80 MMHG | RESPIRATION RATE: 16 BRPM | SYSTOLIC BLOOD PRESSURE: 147 MMHG | OXYGEN SATURATION: 98 % | HEART RATE: 94 BPM | TEMPERATURE: 96.9 F | WEIGHT: 128.38 LBS | BODY MASS INDEX: 18.38 KG/M2

## 2021-02-08 DIAGNOSIS — Z98.890 OTHER SPECIFIED POSTPROCEDURAL STATES: Chronic | ICD-10-CM

## 2021-02-08 DIAGNOSIS — C34.90 MALIGNANT NEOPLASM OF UNSPECIFIED PART OF UNSPECIFIED BRONCHUS OR LUNG: ICD-10-CM

## 2021-02-08 PROCEDURE — 93306 TTE W/DOPPLER COMPLETE: CPT | Mod: 26

## 2021-02-08 PROCEDURE — 93000 ELECTROCARDIOGRAM COMPLETE: CPT

## 2021-02-08 PROCEDURE — 99214 OFFICE O/P EST MOD 30 MIN: CPT

## 2021-02-09 PROBLEM — R94.31 ACUTE ELECTROCARDIOGRAM CHANGES: Status: ACTIVE | Noted: 2021-02-09

## 2021-02-09 NOTE — HISTORY OF PRESENT ILLNESS
[Disease: _____________________] : Disease: [unfilled] [M: ___] : M[unfilled] [AJCC Stage: ____] : AJCC Stage: [unfilled] [de-identified] : 71 yo M with tobacco smoking hx and 911 exposure ( being [facility],  was diagnosed with COPD/emphysema (dx soon after 9/11), had screening CT in 2019 which showed a lung mass. Work up at [facility]- solitary mass which was resected (benign-organizing PNA). He was referred to pulm grant a year ago by [facility] program but he made an appointment this year with Dr. Pritchard. He referred him for screening CT in Feb which was done in August and showed right upper lobe 3.7 x 3.8 cm mass with interval increase in size since January 7, 2019 worrisome for neoplasm possibly a primary lung neoplasm. Multiple bilateral smaller nodular opacities are new since January 7, 2019 may represent metastatic disease. Enlarged anterior mediastinal lymph node worrisome for metastatic disease. PET/CT 8/25/2020 IMPRESSION: Since outside PET/CT 1/30/2019: Considerable interval increased size and FDG avidity of a now spiculated right upper lobe mass with central necrosis suspicious for primary lung malignancy. New FDG avid enlarged mediastinal lymph nodes and bilateral pulmonary nodules probably metastatic disease. Indeterminate FDG avid left intraparotid focus, not well evaluated secondary to misregistration artifact, however appears new since prior PET/CT, possibly metastatic. He underwent CT guided bx by Dr. Barraza at [facility] which was suggestive of malignancy. He subsequently underwent bronch and EBUS/bx of level 4 LN which was c/w adeno ca, lung primary. He is still doing well from a symptomatic standpoint. He has lost some weight which he usually does every Summer. He denies fevers, chills, dyspnea, chest pain/tightness, cough, wheeze. Continues to work on a full-time basis as a . \par 10/27/20: pt has no new complaints. He had Ct scans and MRi of brain interval enlargement of multiple bilateral pulmonary masses and mediastinal adenopathy consistent with progressive disease. CT abdomen and MRI brain revealed no mets. \par \par 11/6/2020: Pt presents prior to starting treatment today to review regimen, address any concerns and provide written consent. He offers no new complaints. \par \par 11/27/2020: Pt returns for follow up. S/p first cycle. Tolerating well . NOT SMOKING!!!!! Doing well. Had one episode of near syncope (vaso-vagal). Pt states he was very dehydrated. No further episodes since. Pt states he "feels great"\par \par 12/18/2020: Pt returns for follow up. Tolerating treatment wo any AE. Only reports mild occasional fatigue. Scheduled for C#3 today\par \par 1/8/2021: Pt returns for follow up and discussion of findings on recent imaging. He is feeling well. Appetite good. Occasional fatigue. States he is losing his hair and is very unhappy about it. \par  [de-identified] : adeno ca [de-identified] : PDL1 80% [de-identified] : Patient came to clinic for follow up today, he is s/p C4 Carbo/Alimta, Keytruda on 1/8/21, he reports worsening fatigue, poor appetite but stable weight, also reports  for past 2-3 days and  today, reports burning sensation feeling in chest that radiates to his throat and  nausea. Denies sob and skin rash. He did not take any BP meds today as his BP was in 100s He also experiences constipation. ECG was done in the office and showed some flip T waves in anterior leads. Plan to send him to [facility] to evaluate.

## 2021-02-09 NOTE — ASSESSMENT
[Palliative] : Goals of care discussed with patient: Palliative [FreeTextEntry1] : 69 yo with newly diagnosed NSCLC, at least stage IIIB, maybe stage IV if contralateral nodules are malignant\par Reviewed brain MRI and CT scans- overall POD with clear progression of contralateral nodules confirming stage IV disease. MRI brain and CT abd reveal no mets\par Discussed at TB- no role for bx of contralateral nodule- PET/CT and recent CT scans are convincing for metastatic involvement of left lung. \par PDL1 was 80% but tumor NGS could not be performed because of QNS\par Blood NGS through [facility] revealed- HRAS and TP53 mutations. \par Pt starting with carbo/alimta/pembro as front line option. Completed 4 cycles wo AE. \par Continue folic acid. B12 every other treatment \par Plan to continue current regimen for 6 cycles and then drop carbo and continue with alimta/keytruda maintenance.\par Patient will be sent to [facility] because of fluctuation in BP ( FOR 2-3 days and now 109) and ECG changes and chest pain to be assessed.

## 2021-02-09 NOTE — REVIEW OF SYSTEMS
[Fatigue] : fatigue [SOB on Exertion] : shortness of breath during exertion [Negative] : Allergic/Immunologic [Chest Pain] : chest pain [Recent Change In Weight] : ~T no recent weight change

## 2021-02-11 ENCOUNTER — FORM ENCOUNTER (OUTPATIENT)
Age: 71
End: 2021-02-11

## 2021-02-17 ENCOUNTER — OUTPATIENT (OUTPATIENT)
Dept: OUTPATIENT SERVICES | Facility: HOSPITAL | Age: 71
LOS: 1 days | Discharge: HOME | End: 2021-02-17

## 2021-02-17 ENCOUNTER — OUTPATIENT (OUTPATIENT)
Dept: OUTPATIENT SERVICES | Facility: HOSPITAL | Age: 71
LOS: 1 days | End: 2021-02-17
Payer: COMMERCIAL

## 2021-02-17 ENCOUNTER — APPOINTMENT (OUTPATIENT)
Dept: RADIOLOGY | Facility: HOSPITAL | Age: 71
End: 2021-02-17
Payer: COMMERCIAL

## 2021-02-17 DIAGNOSIS — C34.11 MALIGNANT NEOPLASM OF UPPER LOBE, RIGHT BRONCHUS OR LUNG: ICD-10-CM

## 2021-02-17 DIAGNOSIS — R13.10 DYSPHAGIA, UNSPECIFIED: ICD-10-CM

## 2021-02-17 DIAGNOSIS — Z20.822 CONTACT WITH AND (SUSPECTED) EXPOSURE TO COVID-19: ICD-10-CM

## 2021-02-17 DIAGNOSIS — R19.5 OTHER FECAL ABNORMALITIES: ICD-10-CM

## 2021-02-17 DIAGNOSIS — Z98.890 OTHER SPECIFIED POSTPROCEDURAL STATES: Chronic | ICD-10-CM

## 2021-02-17 PROCEDURE — 74221 X-RAY XM ESOPHAGUS 2CNTRST: CPT | Mod: 26

## 2021-02-17 PROCEDURE — 74221 X-RAY XM ESOPHAGUS 2CNTRST: CPT

## 2021-02-19 ENCOUNTER — LABORATORY RESULT (OUTPATIENT)
Age: 71
End: 2021-02-19

## 2021-02-19 ENCOUNTER — RESULT REVIEW (OUTPATIENT)
Age: 71
End: 2021-02-19

## 2021-02-19 ENCOUNTER — APPOINTMENT (OUTPATIENT)
Dept: INFUSION THERAPY | Facility: HOSPITAL | Age: 71
End: 2021-02-19

## 2021-02-19 ENCOUNTER — APPOINTMENT (OUTPATIENT)
Dept: HEMATOLOGY ONCOLOGY | Facility: CLINIC | Age: 71
End: 2021-02-19
Payer: COMMERCIAL

## 2021-02-19 ENCOUNTER — NON-APPOINTMENT (OUTPATIENT)
Age: 71
End: 2021-02-19

## 2021-02-19 VITALS
OXYGEN SATURATION: 99 % | HEIGHT: 70 IN | HEART RATE: 100 BPM | RESPIRATION RATE: 16 BRPM | SYSTOLIC BLOOD PRESSURE: 132 MMHG | WEIGHT: 130.07 LBS | DIASTOLIC BLOOD PRESSURE: 83 MMHG | TEMPERATURE: 97.7 F | BODY MASS INDEX: 18.62 KG/M2

## 2021-02-19 LAB
BASOPHILS # BLD AUTO: 0.06 K/UL — SIGNIFICANT CHANGE UP (ref 0–0.2)
BASOPHILS NFR BLD AUTO: 0.8 % — SIGNIFICANT CHANGE UP (ref 0–2)
EOSINOPHIL # BLD AUTO: 0.11 K/UL — SIGNIFICANT CHANGE UP (ref 0–0.5)
EOSINOPHIL NFR BLD AUTO: 1.5 % — SIGNIFICANT CHANGE UP (ref 0–6)
HCT VFR BLD CALC: 30.7 % — LOW (ref 39–50)
HGB BLD-MCNC: 10.4 G/DL — LOW (ref 13–17)
IMM GRANULOCYTES NFR BLD AUTO: 1.2 % — SIGNIFICANT CHANGE UP (ref 0–1.5)
LYMPHOCYTES # BLD AUTO: 1.58 K/UL — SIGNIFICANT CHANGE UP (ref 1–3.3)
LYMPHOCYTES # BLD AUTO: 21.3 % — SIGNIFICANT CHANGE UP (ref 13–44)
MCHC RBC-ENTMCNC: 33.7 PG — SIGNIFICANT CHANGE UP (ref 27–34)
MCHC RBC-ENTMCNC: 33.9 G/DL — SIGNIFICANT CHANGE UP (ref 32–36)
MCV RBC AUTO: 99.4 FL — SIGNIFICANT CHANGE UP (ref 80–100)
MONOCYTES # BLD AUTO: 0.72 K/UL — SIGNIFICANT CHANGE UP (ref 0–0.9)
MONOCYTES NFR BLD AUTO: 9.7 % — SIGNIFICANT CHANGE UP (ref 2–14)
NEUTROPHILS # BLD AUTO: 4.87 K/UL — SIGNIFICANT CHANGE UP (ref 1.8–7.4)
NEUTROPHILS NFR BLD AUTO: 65.5 % — SIGNIFICANT CHANGE UP (ref 43–77)
NRBC # BLD: 0 /100 WBCS — SIGNIFICANT CHANGE UP (ref 0–0)
PLATELET # BLD AUTO: 219 K/UL — SIGNIFICANT CHANGE UP (ref 150–400)
RBC # BLD: 3.09 M/UL — LOW (ref 4.2–5.8)
RBC # FLD: 14.6 % — HIGH (ref 10.3–14.5)
WBC # BLD: 7.43 K/UL — SIGNIFICANT CHANGE UP (ref 3.8–10.5)
WBC # FLD AUTO: 7.43 K/UL — SIGNIFICANT CHANGE UP (ref 3.8–10.5)

## 2021-02-19 PROCEDURE — 99214 OFFICE O/P EST MOD 30 MIN: CPT

## 2021-02-19 NOTE — ADDENDUM
[FreeTextEntry1] : Cardiac risk assessment and recommendations for colonoscopy.\par \par Re: procedure scheduled 3/19 for follow up of abnormal test result and colitis seen on CT scan.\par \par Patient is at increased risk of complications because of chronic lung disease, atherosclerosis, and recent pulmonary embolism. No further cardiac testing is required prior to this procedure.\par \par He should continue apixaban uninterrupted if possible for at least 3 months following the recently diagnosed pulmonary embolism.\par \par Please feel free to contact me if you would like to discuss any aspect of his care.\par \par Very truly yours,\par \par César Padgett MD FAC\par Cardio-Oncology \par

## 2021-02-19 NOTE — REASON FOR VISIT
[Follow-Up - Clinic] : a clinic follow-up of [Coronary Artery Disease] : coronary artery disease [Dyspnea] : dyspnea [FreeTextEntry1] : pulmonary embolism

## 2021-02-19 NOTE — ASSESSMENT
[FreeTextEntry1] : 69 yo with newly diagnosed NSCLC, at least stage IIIB, maybe stage IV if contralateral nodules are malignant\par Reviewed brain MRI and CT scans- overall POD with clear progression of contralateral nodules confirming stage IV disease. MRI brain and CT abd reveal no mets\par Discussed at TB- no role for bx of contralateral nodule- PET/CT and recent CT scans are convincing for metastatic involvement of left lung. \par PDL1 was 80% but tumor NGS could not be performed because of QNS\par Blood NGS through [facility] revealed- HRAS and TP53 mutations. \par Pt started with carbo/alimta/pembro as front line option. Completed 4 cycles wo AE. \par Continue folic acid. B12 every other treatment \par We had planned for 6 cycles and then drop carbo and continue with alimta/keytruda maintenance.\par However, in light of recent events- the decision was made  hold off on further chemo and proceed with Keytruda alone. He has been tolerating well and will continue on q3w regimen. \par Pt scheduled for colonoscopy on 3/19/21 for evaluation of positive screening assay sent by GI\par repeat imaging april\par OV 6 weeks. \par \par  [Palliative] : Goals of care discussed with patient: Palliative

## 2021-02-19 NOTE — PHYSICAL EXAM
[General Appearance - Well Developed] : well developed [General Appearance - In No Acute Distress] : no acute distress [Normal Conjunctiva] : the conjunctiva exhibited no abnormalities [Eyelids - No Xanthelasma] : the eyelids demonstrated no xanthelasmas [Normal Oropharynx] : normal oropharynx [Respiration, Rhythm And Depth] : normal respiratory rhythm and effort [Exaggerated Use Of Accessory Muscles For Inspiration] : no accessory muscle use [Auscultation Breath Sounds / Voice Sounds] : lungs were clear to auscultation bilaterally [Heart Rate And Rhythm] : heart rate and rhythm were normal [Heart Sounds] : normal S1 and S2 [Murmurs] : no murmurs present [Arterial Pulses Normal] : the arterial pulses were normal [Abdomen Tenderness] : non-tender [Abnormal Walk] : normal gait [Gait - Sufficient For Exercise Testing] : the gait was sufficient for exercise testing [Cyanosis, Localized] : no localized cyanosis [Skin Color & Pigmentation] : normal skin color and pigmentation [Skin Turgor] : normal skin turgor [Oriented To Time, Place, And Person] : oriented to person, place, and time [Affect] : the affect was normal [Mood] : the mood was normal [FreeTextEntry1] : small ecchymotic lesion on right arm

## 2021-02-19 NOTE — HISTORY OF PRESENT ILLNESS
[Disease: _____________________] : Disease: [unfilled] [M: ___] : M[unfilled] [AJCC Stage: ____] : AJCC Stage: [unfilled] [de-identified] : 71 yo M with tobacco smoking hx and 911 exposure ( being followed at [facility],  was diagnosed with COPD/emphysema (dx soon after 9/11), had screening CT in 2019 which showed a lung mass. Work up at [facility]- solitary mass which was resected (benign-organizing PNA). He was referred to pulm grant a year ago by [facility] program but he made an appointment this year with Dr. Pritchard. He referred him for screening CT in Feb which was done in August and showed right upper lobe 3.7 x 3.8 cm mass with interval increase in size since January 7, 2019 worrisome for neoplasm possibly a primary lung neoplasm. Multiple bilateral smaller nodular opacities are new since January 7, 2019 may represent metastatic disease. Enlarged anterior mediastinal lymph node worrisome for metastatic disease. PET/CT 8/25/2020 IMPRESSION: Since outside PET/CT 1/30/2019: Considerable interval increased size and FDG avidity of a now spiculated right upper lobe mass with central necrosis suspicious for primary lung malignancy. New FDG avid enlarged mediastinal lymph nodes and bilateral pulmonary nodules probably metastatic disease. Indeterminate FDG avid left intraparotid focus, not well evaluated secondary to misregistration artifact, however appears new since prior PET/CT, possibly metastatic. He underwent CT guided bx by Dr. Barraza at [facility] which was suggestive of malignancy. He subsequently underwent bronch and EBUS/bx of level 4 LN which was c/w adeno ca, lung primary. He is still doing well from a symptomatic standpoint. He has lost some weight which he usually does every Summer. He denies fevers, chills, dyspnea, chest pain/tightness, cough, wheeze. Continues to work on a full-time basis as a . \par 10/27/20: pt has no new complaints. He had Ct scans and MRi of brain interval enlargement of multiple bilateral pulmonary masses and mediastinal adenopathy consistent with progressive disease. CT abdomen and MRI brain revealed no mets. \par \par 11/6/2020: Pt presents prior to starting treatment today to review regimen, address any concerns and provide written consent. He offers no new complaints. \par \par 11/27/2020: Pt returns for follow up. S/p first cycle. Tolerating well . NOT SMOKING!!!!! Doing well. Had one episode of near syncope (vaso-vagal). Pt states he was very dehydrated. No further episodes since. Pt states he "feels great"\par \par 12/18/2020: Pt returns for follow up. Tolerating treatment wo any AE. Only reports mild occasional fatigue. Scheduled for C#3 today\par \par 1/8/2021: Pt returns for follow up and discussion of findings on recent imaging. He is feeling well. Appetite good. Occasional fatigue. States he is losing his hair and is very unhappy about it. \par \par 1/14/21: Patient came to clinic for follow up today, he is s/p C4 Carbo/Alimta, Keytruda on 1/8/21, he reports worsening fatigue, poor appetite but stable weight, also reports  for past 2-3 days and  today, reports burning sensation feeling in chest that radiates to his throat and  nausea. Denies sob and skin rash. He did not take any BP meds today as his BP was in 100s He also experiences constipation. ECG was done in the office and showed some flip T waves in anterior leads. Plan to send him to [facility] to evaluate.\par \par 1/29/21: Pt had a prolonged recovery period after last treatment. Nausea, dysphagia, weight loss, and chest pain. As noted above, pt was hospitalized. No acute etiology was found and he was discharged home., He feels better now. Started eating again. CT scan in the hospital on 1/14 revealed  \par 1. Small right upper lobe pulmonary embolism. No evidence of pulmonary infarct or right heart strain.\par 2. Right upper lobe pulmonary mass and bilateral pulmonary nodules are unchanged from 01/05/2021.\par 3. There is extensive wall thickening in the proximal duodenum and within left upper quadrant small bowel loops, compatible with enteritis. Upper abdominal free fluid seen between the duodenum and pancreas and within the left upper quadrant, inferior to the pancreas, may be related to enteritis. Superimposed pancreatitis should be excluded clinically.\par 4. Free fluid in the right perinephric space, posterior-medial to the right kidney is new from 01/05/2021. Underlying genitourinary infection/pyelonephritis should be excluded clinically.\par \par He has lost a lot of weight since last visit. \par \par 2/19/21: Pt reports feeling much better. No ae from last treatment with monotherapy Keytruda. Pt has been seeing GI and cologard colon cancer assay was reported as positive and patient has been scheduled for colonoscopy on 3/19/21. No new complaints. \par  [de-identified] : adeno ca [de-identified] : PDL1 80%

## 2021-02-19 NOTE — REVIEW OF SYSTEMS
[Fatigue] : fatigue [Recent Change In Weight] : ~T no recent weight change [Chest Pain] : no chest pain [SOB on Exertion] : shortness of breath during exertion [Negative] : Allergic/Immunologic

## 2021-02-19 NOTE — ASSESSMENT
[FreeTextEntry1] : 70 year old man with lung cancer on carbo/pemetrexed with pembrolizumab x4 (last 1/8), CAD risk factors including smoking, 9-11 exposure, immunotherapy, hypertension with coronary calcifications on CT scan.\par \par Dysphagia, colitis on CT, likely low grade reaction to immunotherapy. Transient ECG changes and detectable troponin and elevated pro-BNP may be explained by hypertension and/or acute PE, but require close monitoring and further evaluation for possible low grade myocarditis or other treatment related cardiac disease.\par \par Needs aggressive CAD risk factor modification given CACs and risk factor profile, particularly given recent data demonstrating atherosclerosis progression following immunotherapy. I discussed this at length with the patient today. Patient on antithrombotic therapy.\par \par Blood pressure is normal on assessment today. Continue amlodipine 10 mg daily. At some point he should be referred for AAA screening.\par \par For recent acute PE, will continue apixaban 5 mg BID.\par \par Will add rosuvastatin 5 mg daily given coronary calcifications noted on CT, LDL is 71.\par \par Continue BP monitoring, will likely require additional agent at some point. Would add/resume HCTZ next.\par \par Follow up with me in 3 months, earlier if any new symptoms.\par \par

## 2021-02-21 DIAGNOSIS — Z51.11 ENCOUNTER FOR ANTINEOPLASTIC CHEMOTHERAPY: ICD-10-CM

## 2021-02-21 DIAGNOSIS — R11.2 NAUSEA WITH VOMITING, UNSPECIFIED: ICD-10-CM

## 2021-02-21 LAB
ALBUMIN SERPL ELPH-MCNC: 3.6 G/DL
ALP BLD-CCNC: 68 U/L
ALT SERPL-CCNC: 9 U/L
ANION GAP SERPL CALC-SCNC: 14 MMOL/L
AST SERPL-CCNC: 18 U/L
BILIRUB SERPL-MCNC: 0.4 MG/DL
BUN SERPL-MCNC: 15 MG/DL
CALCIUM SERPL-MCNC: 8.6 MG/DL
CHLORIDE SERPL-SCNC: 100 MMOL/L
CK MB BLD-MCNC: 1.9 NG/ML
CO2 SERPL-SCNC: 23 MMOL/L
CREAT SERPL-MCNC: 1.2 MG/DL
GLUCOSE SERPL-MCNC: 111 MG/DL
NT-PROBNP SERPL-MCNC: 5716 PG/ML
POTASSIUM SERPL-SCNC: 3.6 MMOL/L
PROT SERPL-MCNC: 6.1 G/DL
SODIUM SERPL-SCNC: 137 MMOL/L
TROPONIN-T, HIGH SENSITIVITY: 27 NG/L

## 2021-02-23 ENCOUNTER — FORM ENCOUNTER (OUTPATIENT)
Age: 71
End: 2021-02-23

## 2021-03-10 ENCOUNTER — LABORATORY RESULT (OUTPATIENT)
Age: 71
End: 2021-03-10

## 2021-03-10 ENCOUNTER — APPOINTMENT (OUTPATIENT)
Dept: HEMATOLOGY ONCOLOGY | Facility: CLINIC | Age: 71
End: 2021-03-10

## 2021-03-12 ENCOUNTER — RESULT REVIEW (OUTPATIENT)
Age: 71
End: 2021-03-12

## 2021-03-12 ENCOUNTER — LABORATORY RESULT (OUTPATIENT)
Age: 71
End: 2021-03-12

## 2021-03-12 ENCOUNTER — APPOINTMENT (OUTPATIENT)
Dept: INFUSION THERAPY | Facility: HOSPITAL | Age: 71
End: 2021-03-12

## 2021-03-12 LAB
BASOPHILS # BLD AUTO: 0.04 K/UL — SIGNIFICANT CHANGE UP (ref 0–0.2)
BASOPHILS NFR BLD AUTO: 0.8 % — SIGNIFICANT CHANGE UP (ref 0–2)
EOSINOPHIL # BLD AUTO: 0.05 K/UL — SIGNIFICANT CHANGE UP (ref 0–0.5)
EOSINOPHIL NFR BLD AUTO: 1 % — SIGNIFICANT CHANGE UP (ref 0–6)
HCT VFR BLD CALC: 35.1 % — LOW (ref 39–50)
HGB BLD-MCNC: 11.7 G/DL — LOW (ref 13–17)
IMM GRANULOCYTES NFR BLD AUTO: 0.4 % — SIGNIFICANT CHANGE UP (ref 0–1.5)
LYMPHOCYTES # BLD AUTO: 1.1 K/UL — SIGNIFICANT CHANGE UP (ref 1–3.3)
LYMPHOCYTES # BLD AUTO: 22.9 % — SIGNIFICANT CHANGE UP (ref 13–44)
MCHC RBC-ENTMCNC: 33 PG — SIGNIFICANT CHANGE UP (ref 27–34)
MCHC RBC-ENTMCNC: 33.3 G/DL — SIGNIFICANT CHANGE UP (ref 32–36)
MCV RBC AUTO: 98.9 FL — SIGNIFICANT CHANGE UP (ref 80–100)
MONOCYTES # BLD AUTO: 0.4 K/UL — SIGNIFICANT CHANGE UP (ref 0–0.9)
MONOCYTES NFR BLD AUTO: 8.3 % — SIGNIFICANT CHANGE UP (ref 2–14)
NEUTROPHILS # BLD AUTO: 3.2 K/UL — SIGNIFICANT CHANGE UP (ref 1.8–7.4)
NEUTROPHILS NFR BLD AUTO: 66.6 % — SIGNIFICANT CHANGE UP (ref 43–77)
NRBC # BLD: 0 /100 WBCS — SIGNIFICANT CHANGE UP (ref 0–0)
PLATELET # BLD AUTO: 242 K/UL — SIGNIFICANT CHANGE UP (ref 150–400)
RBC # BLD: 3.55 M/UL — LOW (ref 4.2–5.8)
RBC # FLD: 12.1 % — SIGNIFICANT CHANGE UP (ref 10.3–14.5)
WBC # BLD: 4.81 K/UL — SIGNIFICANT CHANGE UP (ref 3.8–10.5)
WBC # FLD AUTO: 4.81 K/UL — SIGNIFICANT CHANGE UP (ref 3.8–10.5)

## 2021-03-15 ENCOUNTER — NON-APPOINTMENT (OUTPATIENT)
Age: 71
End: 2021-03-15

## 2021-03-16 ENCOUNTER — APPOINTMENT (OUTPATIENT)
Dept: GASTROENTEROLOGY | Facility: CLINIC | Age: 71
End: 2021-03-16

## 2021-03-19 ENCOUNTER — RESULT REVIEW (OUTPATIENT)
Age: 71
End: 2021-03-19

## 2021-03-19 ENCOUNTER — OUTPATIENT (OUTPATIENT)
Dept: OUTPATIENT SERVICES | Facility: HOSPITAL | Age: 71
LOS: 1 days | Discharge: HOME | End: 2021-03-19
Payer: COMMERCIAL

## 2021-03-19 ENCOUNTER — APPOINTMENT (OUTPATIENT)
Dept: GASTROENTEROLOGY | Facility: HOSPITAL | Age: 71
End: 2021-03-19
Payer: COMMERCIAL

## 2021-03-19 VITALS
DIASTOLIC BLOOD PRESSURE: 82 MMHG | RESPIRATION RATE: 16 BRPM | OXYGEN SATURATION: 99 % | TEMPERATURE: 98 F | HEART RATE: 86 BPM | SYSTOLIC BLOOD PRESSURE: 135 MMHG

## 2021-03-19 VITALS
SYSTOLIC BLOOD PRESSURE: 136 MMHG | DIASTOLIC BLOOD PRESSURE: 66 MMHG | HEART RATE: 70 BPM | RESPIRATION RATE: 15 BRPM | OXYGEN SATURATION: 100 %

## 2021-03-19 DIAGNOSIS — R13.10 DYSPHAGIA, UNSPECIFIED: ICD-10-CM

## 2021-03-19 DIAGNOSIS — Z12.11 ENCOUNTER FOR SCREENING FOR MALIGNANT NEOPLASM OF COLON: ICD-10-CM

## 2021-03-19 DIAGNOSIS — Z98.890 OTHER SPECIFIED POSTPROCEDURAL STATES: Chronic | ICD-10-CM

## 2021-03-19 PROCEDURE — 45378 DIAGNOSTIC COLONOSCOPY: CPT | Mod: 33

## 2021-03-19 PROCEDURE — 88312 SPECIAL STAINS GROUP 1: CPT | Mod: 26

## 2021-03-19 PROCEDURE — 88305 TISSUE EXAM BY PATHOLOGIST: CPT | Mod: 26

## 2021-03-19 RX ORDER — SODIUM CHLORIDE 9 MG/ML
1000 INJECTION, SOLUTION INTRAVENOUS
Refills: 0 | Status: DISCONTINUED | OUTPATIENT
Start: 2021-03-19 | End: 2021-03-19

## 2021-03-19 RX ORDER — ONDANSETRON 8 MG/1
4 TABLET, FILM COATED ORAL ONCE
Refills: 0 | Status: DISCONTINUED | OUTPATIENT
Start: 2021-03-19 | End: 2021-03-19

## 2021-03-19 RX ADMIN — SODIUM CHLORIDE 50 MILLILITER(S): 9 INJECTION, SOLUTION INTRAVENOUS at 15:20

## 2021-03-24 LAB — SURGICAL PATHOLOGY STUDY: SIGNIFICANT CHANGE UP

## 2021-03-27 DIAGNOSIS — Z85.118 PERSONAL HISTORY OF OTHER MALIGNANT NEOPLASM OF BRONCHUS AND LUNG: ICD-10-CM

## 2021-03-27 DIAGNOSIS — K64.8 OTHER HEMORRHOIDS: ICD-10-CM

## 2021-03-27 DIAGNOSIS — J44.9 CHRONIC OBSTRUCTIVE PULMONARY DISEASE, UNSPECIFIED: ICD-10-CM

## 2021-03-27 DIAGNOSIS — K29.50 UNSPECIFIED CHRONIC GASTRITIS WITHOUT BLEEDING: ICD-10-CM

## 2021-03-27 DIAGNOSIS — Z86.711 PERSONAL HISTORY OF PULMONARY EMBOLISM: ICD-10-CM

## 2021-03-27 DIAGNOSIS — K29.80 DUODENITIS WITHOUT BLEEDING: ICD-10-CM

## 2021-03-27 DIAGNOSIS — Z79.01 LONG TERM (CURRENT) USE OF ANTICOAGULANTS: ICD-10-CM

## 2021-03-27 DIAGNOSIS — Z12.11 ENCOUNTER FOR SCREENING FOR MALIGNANT NEOPLASM OF COLON: ICD-10-CM

## 2021-03-28 ENCOUNTER — OUTPATIENT (OUTPATIENT)
Dept: OUTPATIENT SERVICES | Facility: HOSPITAL | Age: 71
LOS: 1 days | Discharge: HOME | End: 2021-03-28

## 2021-03-28 DIAGNOSIS — Z98.890 OTHER SPECIFIED POSTPROCEDURAL STATES: Chronic | ICD-10-CM

## 2021-03-28 DIAGNOSIS — C34.11 MALIGNANT NEOPLASM OF UPPER LOBE, RIGHT BRONCHUS OR LUNG: ICD-10-CM

## 2021-04-02 ENCOUNTER — RESULT REVIEW (OUTPATIENT)
Age: 71
End: 2021-04-02

## 2021-04-02 ENCOUNTER — LABORATORY RESULT (OUTPATIENT)
Age: 71
End: 2021-04-02

## 2021-04-02 ENCOUNTER — APPOINTMENT (OUTPATIENT)
Dept: INFUSION THERAPY | Facility: HOSPITAL | Age: 71
End: 2021-04-02

## 2021-04-02 ENCOUNTER — APPOINTMENT (OUTPATIENT)
Dept: HEMATOLOGY ONCOLOGY | Facility: CLINIC | Age: 71
End: 2021-04-02
Payer: COMMERCIAL

## 2021-04-02 VITALS
RESPIRATION RATE: 14 BRPM | WEIGHT: 127.87 LBS | HEART RATE: 96 BPM | BODY MASS INDEX: 18.35 KG/M2 | DIASTOLIC BLOOD PRESSURE: 74 MMHG | TEMPERATURE: 98.1 F | OXYGEN SATURATION: 99 % | SYSTOLIC BLOOD PRESSURE: 130 MMHG

## 2021-04-02 LAB
BASOPHILS # BLD AUTO: 0.06 K/UL — SIGNIFICANT CHANGE UP (ref 0–0.2)
BASOPHILS NFR BLD AUTO: 1.2 % — SIGNIFICANT CHANGE UP (ref 0–2)
EOSINOPHIL # BLD AUTO: 0.05 K/UL — SIGNIFICANT CHANGE UP (ref 0–0.5)
EOSINOPHIL NFR BLD AUTO: 1 % — SIGNIFICANT CHANGE UP (ref 0–6)
HCT VFR BLD CALC: 36.7 % — LOW (ref 39–50)
HGB BLD-MCNC: 11.9 G/DL — LOW (ref 13–17)
IMM GRANULOCYTES NFR BLD AUTO: 0.2 % — SIGNIFICANT CHANGE UP (ref 0–1.5)
LYMPHOCYTES # BLD AUTO: 1.12 K/UL — SIGNIFICANT CHANGE UP (ref 1–3.3)
LYMPHOCYTES # BLD AUTO: 22.4 % — SIGNIFICANT CHANGE UP (ref 13–44)
MCHC RBC-ENTMCNC: 32.1 PG — SIGNIFICANT CHANGE UP (ref 27–34)
MCHC RBC-ENTMCNC: 32.4 G/DL — SIGNIFICANT CHANGE UP (ref 32–36)
MCV RBC AUTO: 98.9 FL — SIGNIFICANT CHANGE UP (ref 80–100)
MONOCYTES # BLD AUTO: 0.48 K/UL — SIGNIFICANT CHANGE UP (ref 0–0.9)
MONOCYTES NFR BLD AUTO: 9.6 % — SIGNIFICANT CHANGE UP (ref 2–14)
NEUTROPHILS # BLD AUTO: 3.27 K/UL — SIGNIFICANT CHANGE UP (ref 1.8–7.4)
NEUTROPHILS NFR BLD AUTO: 65.6 % — SIGNIFICANT CHANGE UP (ref 43–77)
NRBC # BLD: 0 /100 WBCS — SIGNIFICANT CHANGE UP (ref 0–0)
PLATELET # BLD AUTO: 251 K/UL — SIGNIFICANT CHANGE UP (ref 150–400)
RBC # BLD: 3.71 M/UL — LOW (ref 4.2–5.8)
RBC # FLD: 11.7 % — SIGNIFICANT CHANGE UP (ref 10.3–14.5)
WBC # BLD: 4.99 K/UL — SIGNIFICANT CHANGE UP (ref 3.8–10.5)
WBC # FLD AUTO: 4.99 K/UL — SIGNIFICANT CHANGE UP (ref 3.8–10.5)

## 2021-04-02 PROCEDURE — 99214 OFFICE O/P EST MOD 30 MIN: CPT

## 2021-04-05 DIAGNOSIS — Z51.11 ENCOUNTER FOR ANTINEOPLASTIC CHEMOTHERAPY: ICD-10-CM

## 2021-04-05 DIAGNOSIS — R11.2 NAUSEA WITH VOMITING, UNSPECIFIED: ICD-10-CM

## 2021-04-05 NOTE — HISTORY OF PRESENT ILLNESS
[Disease: _____________________] : Disease: [unfilled] [M: ___] : M[unfilled] [AJCC Stage: ____] : AJCC Stage: [unfilled] [de-identified] : 71 yo M with tobacco smoking hx and 911 exposure ( being [facility],  was diagnosed with COPD/emphysema (dx soon after 9/11), had screening CT in 2019 which showed a lung mass. Work up at [facility]- solitary mass which was resected (benign-organizing PNA). He was referred to pulm grant a year ago by [facility] program but he made an appointment this year with Dr. Pritchard. He referred him for screening CT in Feb which was done in August and showed right upper lobe 3.7 x 3.8 cm mass with interval increase in size since January 7, 2019 worrisome for neoplasm possibly a primary lung neoplasm. Multiple bilateral smaller nodular opacities are new since January 7, 2019 may represent metastatic disease. Enlarged anterior mediastinal lymph node worrisome for metastatic disease. PET/CT 8/25/2020 IMPRESSION: Since outside PET/CT 1/30/2019: Considerable interval increased size and FDG avidity of a now spiculated right upper lobe mass with central necrosis suspicious for primary lung malignancy. New FDG avid enlarged mediastinal lymph nodes and bilateral pulmonary nodules probably metastatic disease. Indeterminate FDG avid left intraparotid focus, not well evaluated secondary to misregistration artifact, however appears new since prior PET/CT, possibly metastatic. He underwent CT guided bx by Dr. Barraza at [facility] which was suggestive of malignancy. He subsequently underwent bronch and EBUS/bx of level 4 LN which was c/w adeno ca, lung primary. He is still doing well from a symptomatic standpoint. He has lost some weight which he usually does every Summer. He denies fevers, chills, dyspnea, chest pain/tightness, cough, wheeze. Continues to work on a full-time basis as a . \par 10/27/20: pt has no new complaints. He had Ct scans and MRi of brain interval enlargement of multiple bilateral pulmonary masses and mediastinal adenopathy consistent with progressive disease. CT abdomen and MRI brain revealed no mets. \par \par 11/6/2020: Pt presents prior to starting treatment today to review regimen, address any concerns and provide written consent. He offers no new complaints. \par \par 11/27/2020: Pt returns for follow up. S/p first cycle. Tolerating well . NOT SMOKING!!!!! Doing well. Had one episode of near syncope (vaso-vagal). Pt states he was very dehydrated. No further episodes since. Pt states he "feels great"\par \par 12/18/2020: Pt returns for follow up. Tolerating treatment wo any AE. Only reports mild occasional fatigue. Scheduled for C#3 today\par \par 1/8/2021: Pt returns for follow up and discussion of findings on recent imaging. He is feeling well. Appetite good. Occasional fatigue. States he is losing his hair and is very unhappy about it. \par \par 1/14/21: Patient came to clinic for follow up today, he is s/p C4 Carbo/Alimta, Keytruda on 1/8/21, he reports worsening fatigue, poor appetite but stable weight, also reports  for past 2-3 days and  today, reports burning sensation feeling in chest that radiates to his throat and  nausea. Denies sob and skin rash. He did not take any BP meds today as his BP was in 100s He also experiences constipation. ECG was done in the office and showed some flip T waves in anterior leads. Plan to send him to [facility] to evaluate.\par \par 1/29/21: Pt had a prolonged recovery period after last treatment. Nausea, dysphagia, weight loss, and chest pain. As noted above, pt was hospitalized. No acute etiology was found and he was discharged home., He feels better now. Started eating again. CT scan in the hospital on 1/14 revealed  \par 1. Small right upper lobe pulmonary embolism. No evidence of pulmonary infarct or right heart strain.\par 2. Right upper lobe pulmonary mass and bilateral pulmonary nodules are unchanged from 01/05/2021.\par 3. There is extensive wall thickening in the proximal duodenum and within left upper quadrant small bowel loops, compatible with enteritis. Upper abdominal free fluid seen between the duodenum and pancreas and within the left upper quadrant, inferior to the pancreas, may be related to enteritis. Superimposed pancreatitis should be excluded clinically.\par 4. Free fluid in the right perinephric space, posterior-medial to the right kidney is new from 01/05/2021. Underlying genitourinary infection/pyelonephritis should be excluded clinically.\par \par He has lost a lot of weight since last visit. \par \par 2/19/21: Pt reports feeling much better. No ae from last treatment with monotherapy Keytruda. Pt has been seeing GI and cologard colon cancer assay was reported as positive and patient has been scheduled for colonoscopy on 3/19/21. No new complaints. \par \par 4/2/21: Pt returns for follow up. He is feeling well. States he had colonoscopy and endoscopy last week and "everything was normal." Tolerating treatment well. Offers no complaints. \par  [de-identified] : adeno ca [de-identified] : PDL1 80%

## 2021-04-05 NOTE — REVIEW OF SYSTEMS
[Fatigue] : fatigue [Negative] : Allergic/Immunologic [Recent Change In Weight] : ~T no recent weight change [Chest Pain] : no chest pain [SOB on Exertion] : no shortness of breath during exertion

## 2021-04-07 ENCOUNTER — APPOINTMENT (OUTPATIENT)
Dept: GASTROENTEROLOGY | Facility: CLINIC | Age: 71
End: 2021-04-07
Payer: COMMERCIAL

## 2021-04-07 VITALS
HEIGHT: 70 IN | BODY MASS INDEX: 18.04 KG/M2 | HEART RATE: 96 BPM | TEMPERATURE: 96.9 F | DIASTOLIC BLOOD PRESSURE: 87 MMHG | WEIGHT: 126 LBS | SYSTOLIC BLOOD PRESSURE: 125 MMHG | OXYGEN SATURATION: 99 %

## 2021-04-07 DIAGNOSIS — R11.2 NAUSEA WITH VOMITING, UNSPECIFIED: ICD-10-CM

## 2021-04-07 PROCEDURE — 99204 OFFICE O/P NEW MOD 45 MIN: CPT

## 2021-04-07 NOTE — PHYSICAL EXAM
[General Appearance - Alert] : alert [General Appearance - In No Acute Distress] : in no acute distress [PERRL With Normal Accommodation] : pupils were equal in size, round, and reactive to light [Sclera] : the sclera and conjunctiva were normal [Extraocular Movements] : extraocular movements were intact [Outer Ear] : the ears and nose were normal in appearance [Oropharynx] : the oropharynx was normal [Neck Appearance] : the appearance of the neck was normal [Neck Cervical Mass (___cm)] : no neck mass was observed [Jugular Venous Distention Increased] : there was no jugular-venous distention [Thyroid Diffuse Enlargement] : the thyroid was not enlarged [Thyroid Nodule] : there were no palpable thyroid nodules [Auscultation Breath Sounds / Voice Sounds] : lungs were clear to auscultation bilaterally [Heart Rate And Rhythm] : heart rate was normal and rhythm regular [Heart Sounds] : normal S1 and S2 [Heart Sounds Gallop] : no gallops [Murmurs] : no murmurs [Heart Sounds Pericardial Friction Rub] : no pericardial rub [Normal] : normal [Soft, Nontender] : the abdomen was soft and nontender [Epigastric] : in the epigastric area [No Mass] : no masses were palpated [No HSM] : no hepatosplenomegaly noted [Abnormal Walk] : normal gait [Nail Clubbing] : no clubbing  or cyanosis of the fingernails [Musculoskeletal - Swelling] : no joint swelling seen [Motor Tone] : muscle strength and tone were normal [Skin Color & Pigmentation] : normal skin color and pigmentation [Skin Turgor] : normal skin turgor [] : no rash [Deep Tendon Reflexes (DTR)] : deep tendon reflexes were 2+ and symmetric [Sensation] : the sensory exam was normal to light touch and pinprick [No Focal Deficits] : no focal deficits [Oriented To Time, Place, And Person] : oriented to person, place, and time [Impaired Insight] : insight and judgment were intact [Affect] : the affect was normal

## 2021-04-09 LAB — SARS-COV-2 N GENE NPH QL NAA+PROBE: NOT DETECTED

## 2021-04-17 ENCOUNTER — OUTPATIENT (OUTPATIENT)
Dept: OUTPATIENT SERVICES | Facility: HOSPITAL | Age: 71
LOS: 1 days | End: 2021-04-17
Payer: COMMERCIAL

## 2021-04-17 ENCOUNTER — APPOINTMENT (OUTPATIENT)
Dept: CT IMAGING | Facility: IMAGING CENTER | Age: 71
End: 2021-04-17
Payer: COMMERCIAL

## 2021-04-17 DIAGNOSIS — Z98.890 OTHER SPECIFIED POSTPROCEDURAL STATES: Chronic | ICD-10-CM

## 2021-04-17 DIAGNOSIS — C34.90 MALIGNANT NEOPLASM OF UNSPECIFIED PART OF UNSPECIFIED BRONCHUS OR LUNG: ICD-10-CM

## 2021-04-17 DIAGNOSIS — Z00.8 ENCOUNTER FOR OTHER GENERAL EXAMINATION: ICD-10-CM

## 2021-04-17 PROCEDURE — 71260 CT THORAX DX C+: CPT | Mod: 26

## 2021-04-17 PROCEDURE — 74177 CT ABD & PELVIS W/CONTRAST: CPT | Mod: 26

## 2021-04-17 PROCEDURE — 71260 CT THORAX DX C+: CPT

## 2021-04-17 PROCEDURE — 74177 CT ABD & PELVIS W/CONTRAST: CPT

## 2021-04-20 ENCOUNTER — NON-APPOINTMENT (OUTPATIENT)
Age: 71
End: 2021-04-20

## 2021-04-23 ENCOUNTER — LABORATORY RESULT (OUTPATIENT)
Age: 71
End: 2021-04-23

## 2021-04-23 ENCOUNTER — RESULT REVIEW (OUTPATIENT)
Age: 71
End: 2021-04-23

## 2021-04-23 ENCOUNTER — APPOINTMENT (OUTPATIENT)
Dept: HEMATOLOGY ONCOLOGY | Facility: CLINIC | Age: 71
End: 2021-04-23
Payer: COMMERCIAL

## 2021-04-23 ENCOUNTER — APPOINTMENT (OUTPATIENT)
Dept: INFUSION THERAPY | Facility: HOSPITAL | Age: 71
End: 2021-04-23

## 2021-04-23 VITALS
BODY MASS INDEX: 18.35 KG/M2 | HEART RATE: 88 BPM | TEMPERATURE: 98 F | DIASTOLIC BLOOD PRESSURE: 85 MMHG | WEIGHT: 127.87 LBS | SYSTOLIC BLOOD PRESSURE: 137 MMHG | OXYGEN SATURATION: 99 % | RESPIRATION RATE: 14 BRPM

## 2021-04-23 LAB
BASOPHILS # BLD AUTO: 0.06 K/UL — SIGNIFICANT CHANGE UP (ref 0–0.2)
BASOPHILS NFR BLD AUTO: 1.2 % — SIGNIFICANT CHANGE UP (ref 0–2)
EOSINOPHIL # BLD AUTO: 0.09 K/UL — SIGNIFICANT CHANGE UP (ref 0–0.5)
EOSINOPHIL NFR BLD AUTO: 1.8 % — SIGNIFICANT CHANGE UP (ref 0–6)
HCT VFR BLD CALC: 38.1 % — LOW (ref 39–50)
HGB BLD-MCNC: 12.3 G/DL — LOW (ref 13–17)
IMM GRANULOCYTES NFR BLD AUTO: 0.4 % — SIGNIFICANT CHANGE UP (ref 0–1.5)
LYMPHOCYTES # BLD AUTO: 1.3 K/UL — SIGNIFICANT CHANGE UP (ref 1–3.3)
LYMPHOCYTES # BLD AUTO: 25.6 % — SIGNIFICANT CHANGE UP (ref 13–44)
MCHC RBC-ENTMCNC: 31.1 PG — SIGNIFICANT CHANGE UP (ref 27–34)
MCHC RBC-ENTMCNC: 32.3 G/DL — SIGNIFICANT CHANGE UP (ref 32–36)
MCV RBC AUTO: 96.2 FL — SIGNIFICANT CHANGE UP (ref 80–100)
MONOCYTES # BLD AUTO: 0.54 K/UL — SIGNIFICANT CHANGE UP (ref 0–0.9)
MONOCYTES NFR BLD AUTO: 10.7 % — SIGNIFICANT CHANGE UP (ref 2–14)
NEUTROPHILS # BLD AUTO: 3.06 K/UL — SIGNIFICANT CHANGE UP (ref 1.8–7.4)
NEUTROPHILS NFR BLD AUTO: 60.3 % — SIGNIFICANT CHANGE UP (ref 43–77)
NRBC # BLD: 0 /100 WBCS — SIGNIFICANT CHANGE UP (ref 0–0)
PLATELET # BLD AUTO: 285 K/UL — SIGNIFICANT CHANGE UP (ref 150–400)
RBC # BLD: 3.96 M/UL — LOW (ref 4.2–5.8)
RBC # FLD: 11.8 % — SIGNIFICANT CHANGE UP (ref 10.3–14.5)
WBC # BLD: 5.07 K/UL — SIGNIFICANT CHANGE UP (ref 3.8–10.5)
WBC # FLD AUTO: 5.07 K/UL — SIGNIFICANT CHANGE UP (ref 3.8–10.5)

## 2021-04-23 PROCEDURE — 99214 OFFICE O/P EST MOD 30 MIN: CPT

## 2021-04-23 NOTE — REVIEW OF SYSTEMS
[Fatigue] : fatigue [Recent Change In Weight] : ~T no recent weight change [Chest Pain] : no chest pain [SOB on Exertion] : no shortness of breath during exertion [Negative] : Allergic/Immunologic

## 2021-04-23 NOTE — HISTORY OF PRESENT ILLNESS
[Disease: _____________________] : Disease: [unfilled] [M: ___] : M[unfilled] [AJCC Stage: ____] : AJCC Stage: [unfilled] [de-identified] : 69 yo M with tobacco smoking hx and 911 exposure ( being followed at [facility],  was diagnosed with COPD/emphysema (dx soon after 9/11), had screening CT in 2019 which showed a lung mass. Work up at [facility]- solitary mass which was resected (benign-organizing PNA). He was referred to pulm grant a year ago by [facility] program but he made an appointment this year with Dr. Pritchard. He referred him for screening CT in Feb which was done in August and showed right upper lobe 3.7 x 3.8 cm mass with interval increase in size since January 7, 2019 worrisome for neoplasm possibly a primary lung neoplasm. Multiple bilateral smaller nodular opacities are new since January 7, 2019 may represent metastatic disease. Enlarged anterior mediastinal lymph node worrisome for metastatic disease. PET/CT 8/25/2020 IMPRESSION: Since outside PET/CT 1/30/2019: Considerable interval increased size and FDG avidity of a now spiculated right upper lobe mass with central necrosis suspicious for primary lung malignancy. New FDG avid enlarged mediastinal lymph nodes and bilateral pulmonary nodules probably metastatic disease. Indeterminate FDG avid left intraparotid focus, not well evaluated secondary to misregistration artifact, however appears new since prior PET/CT, possibly metastatic. He underwent CT guided bx by Dr. Barraza at [facility] which was suggestive of malignancy. He subsequently underwent bronch and EBUS/bx of level 4 LN which was c/w adeno ca, lung primary. He is still doing well from a symptomatic standpoint. He has lost some weight which he usually does every Summer. He denies fevers, chills, dyspnea, chest pain/tightness, cough, wheeze. Continues to work on a full-time basis as a . \par 10/27/20: pt has no new complaints. He had Ct scans and MRi of brain interval enlargement of multiple bilateral pulmonary masses and mediastinal adenopathy consistent with progressive disease. CT abdomen and MRI brain revealed no mets. \par \par 11/6/2020: Pt presents prior to starting treatment today to review regimen, address any concerns and provide written consent. He offers no new complaints. \par \par 11/27/2020: Pt returns for follow up. S/p first cycle. Tolerating well . NOT SMOKING!!!!! Doing well. Had one episode of near syncope (vaso-vagal). Pt states he was very dehydrated. No further episodes since. Pt states he "feels great"\par \par 12/18/2020: Pt returns for follow up. Tolerating treatment wo any AE. Only reports mild occasional fatigue. Scheduled for C#3 today\par \par 1/8/2021: Pt returns for follow up and discussion of findings on recent imaging. He is feeling well. Appetite good. Occasional fatigue. States he is losing his hair and is very unhappy about it. \par \par 1/14/21: Patient came to clinic for follow up today, he is s/p C4 Carbo/Alimta, Keytruda on 1/8/21, he reports worsening fatigue, poor appetite but stable weight, also reports  for past 2-3 days and  today, reports burning sensation feeling in chest that radiates to his throat and  nausea. Denies sob and skin rash. He did not take any BP meds today as his BP was in 100s He also experiences constipation. ECG was done in the office and showed some flip T waves in anterior leads. Plan to send him to [facility] to evaluate.\par \par 1/29/21: Pt had a prolonged recovery period after last treatment. Nausea, dysphagia, weight loss, and chest pain. As noted above, pt was hospitalized. No acute etiology was found and he was discharged home., He feels better now. Started eating again. CT scan in the hospital on 1/14 revealed  \par 1. Small right upper lobe pulmonary embolism. No evidence of pulmonary infarct or right heart strain.\par 2. Right upper lobe pulmonary mass and bilateral pulmonary nodules are unchanged from 01/05/2021.\par 3. There is extensive wall thickening in the proximal duodenum and within left upper quadrant small bowel loops, compatible with enteritis. Upper abdominal free fluid seen between the duodenum and pancreas and within the left upper quadrant, inferior to the pancreas, may be related to enteritis. Superimposed pancreatitis should be excluded clinically.\par 4. Free fluid in the right perinephric space, posterior-medial to the right kidney is new from 01/05/2021. Underlying genitourinary infection/pyelonephritis should be excluded clinically.\par \par He has lost a lot of weight since last visit. \par \par 2/19/21: Pt reports feeling much better. No ae from last treatment with monotherapy Keytruda. Pt has been seeing GI and cologard colon cancer assay was reported as positive and patient has been scheduled for colonoscopy on 3/19/21. No new complaints. \par \par 4/2/21: Pt returns for follow up. He is feeling well. States he had colonoscopy and endoscopy last week and "everything was normal." Tolerating treatment well. Offers no complaints. \par \par 4/23/21: Pt returns for follow up and discussion of findings on recent imaging. Offers no complaints. Feeling well. Scheduled for treatment today.  [de-identified] : adeno ca [de-identified] : PDL1 80%

## 2021-04-23 NOTE — ASSESSMENT
[FreeTextEntry1] : 69 yo with newly diagnosed NSCLC stage IV . PDL1 was 80% but tumor NGS could not be performed because of QNS\par Blood NGS through [facility] revealed- HRAS and TP53 mutations.\par  \par Pt started with carbo/alimta/pembro as front line option. Completed 4 cycles wo AE. \par Now on  Keytruda alone monotherapy as maintenance . He has been tolerating well and will continue on q3w regimen. \par Colonoscopy on 3/19/21 . Normal Study per patient. Personally reviewed pathology. Confirmed no evidence for malignancy. \par Personally reviewed imaging with patient. Continued response to therapy. Continue on Keytruda.\par BW today. Will review\par OV 6 weeks. \par \par  [Palliative] : Goals of care discussed with patient: Palliative

## 2021-04-26 ENCOUNTER — NON-APPOINTMENT (OUTPATIENT)
Age: 71
End: 2021-04-26

## 2021-05-10 ENCOUNTER — NON-APPOINTMENT (OUTPATIENT)
Age: 71
End: 2021-05-10

## 2021-05-10 ENCOUNTER — APPOINTMENT (OUTPATIENT)
Dept: CARDIOLOGY | Facility: CLINIC | Age: 71
End: 2021-05-10
Payer: COMMERCIAL

## 2021-05-10 ENCOUNTER — OUTPATIENT (OUTPATIENT)
Dept: OUTPATIENT SERVICES | Facility: HOSPITAL | Age: 71
LOS: 1 days | Discharge: HOME | End: 2021-05-10

## 2021-05-10 VITALS
BODY MASS INDEX: 18.18 KG/M2 | OXYGEN SATURATION: 99 % | DIASTOLIC BLOOD PRESSURE: 81 MMHG | SYSTOLIC BLOOD PRESSURE: 122 MMHG | TEMPERATURE: 97.2 F | HEIGHT: 70 IN | WEIGHT: 127 LBS | HEART RATE: 86 BPM

## 2021-05-10 DIAGNOSIS — Z98.890 OTHER SPECIFIED POSTPROCEDURAL STATES: Chronic | ICD-10-CM

## 2021-05-10 DIAGNOSIS — C34.11 MALIGNANT NEOPLASM OF UPPER LOBE, RIGHT BRONCHUS OR LUNG: ICD-10-CM

## 2021-05-10 PROCEDURE — 99214 OFFICE O/P EST MOD 30 MIN: CPT

## 2021-05-10 PROCEDURE — 99406 BEHAV CHNG SMOKING 3-10 MIN: CPT

## 2021-05-10 PROCEDURE — 93000 ELECTROCARDIOGRAM COMPLETE: CPT

## 2021-05-10 NOTE — CARDIOLOGY SUMMARY
[de-identified] : 5/10/2021: sinus rhythm at 86bpm with RBBB, unchanged from prior [de-identified] : 02/08/2021: technically difficult, MAC, mild MR. Mild LV dysfunction. No pericardial effusion.

## 2021-05-10 NOTE — ASSESSMENT
[FreeTextEntry1] : 71 year old man with lung cancer s/p carbo/pemetrexed and pembrolizumab since 11/2020, CAD risk factors including smoking, 9-11 exposure, immunotherapy, hypertension with coronary calcifications on CT scan.\par \par CAD risk factor modification is important given CACs and risk factor profile, particularly given recent data demonstrating atherosclerosis progression following immunotherapy. I counselled the patient about this today.\par \par Blood pressure is normal on assessment today. Continue amlodipine 10 mg daily. At some point he should be referred for AAA screening.\par \par For recent acute PE, will continue apixaban 5 mg BID. Discussed \par \par Continue rosuvastatin 5 mg daily given coronary calcifications noted on CT, LDL is 71.\par \par Continue BP monitoring.\par \par Follow up with me in 3 months, earlier if any new symptoms.\par \par \par

## 2021-05-10 NOTE — PHYSICAL EXAM
[Well Developed] : well developed [No Acute Distress] : no acute distress [Normal Conjunctiva] : normal conjunctiva [No Xanthelasma] : no xanthelasma [Normal Venous Pressure] : normal venous pressure [No Carotid Bruit] : no carotid bruit [Normal S1, S2] : normal S1, S2 [No Murmur] : no murmur [No Rub] : no rub [Clear Lung Fields] : clear lung fields [Good Air Entry] : good air entry [No Respiratory Distress] : no respiratory distress  [Soft] : abdomen soft [Non Tender] : non-tender [Normal Gait] : normal gait [Gait - Sufficient for Exercise Testing] : gait - sufficient for exercise testing [No Edema] : no edema [No Cyanosis] : no cyanosis [No Rash] : no rash [No Skin Lesions] : no skin lesions [Moves all extremities] : moves all extremities [No Focal Deficits] : no focal deficits [Normal Speech] : normal speech [Alert and Oriented] : alert and oriented

## 2021-05-10 NOTE — HISTORY OF PRESENT ILLNESS
[FreeTextEntry1] : 71 year old man with 911 exposure (), former smoker, hypertension, metastatic lung adenoCA being treated with carbo/pemetrexed and pembrolizumab (11/2020-present), pulmonary embolism, and coronary artery calcifications by CT who is seen for follow up.\par \par He feels well and denies any new cardiac symptoms or complaints. He is taking medications as directed without any notable adverse effects. Continues on immunotherapy with PD-1 inhibitor, every 3 weeks, tolerating well. He smoked 1 or 2 cigarettes in the past month and has some second hand smoke exposure.\par \par \par History:\par At oncology visit 1/14 reported chest discomfort and labile blood pressure with hypertension at home, noted to have new TWI on his ECG and sent to ER for workup. In ER Hs Annika was 27-25-21 ng/L (negative), BNP 5716 (elevated). ECG RBBB, chronic. CT-Angiogram showed new segmental PE, started on anticoagulation with enoxaparin and transitioned to apixaban.\par \par Also noted on the CT were coronary artery calcification and signs of colitis. He was prescribed a course of cipro/flagyl for this by the ER, which he feels did nothing. He denies any chest pain or new shortness of breath. He denies any dizziness, palpitations, syncope, or near syncope. He does complain of difficulty swallowing liquids, worsening following each administration of the PD-1 inhibitor.\par \par He denies any history of heart disease but has right bundle branch block on the ECG, which is chronic. Work as a  causes him moderate stress, and he smoked until cancer diagnosis.\par \par He tells me he takes amlodipine for HTN, but is no longer taking HCTZ or losartan. No recent stress test.\par \par Compliant with apixaban, notes some ecchymoses.\par \par Interval Events:\par Since last visit, seen by GI and pulmonologist. Scheduled for esophagram soon. Dysphagia is much improved, appetite is excellent. No difficulty swallowing pills. Adherent to apixaban and amlodipine. Echocardiogram was performed this AM just prior to today's visit. Labs from last visit reviewed with patient. Lipid panel is at goal. BNP downtrending.\par \par No new chest pain, palps, dizziness/lightheadedness.\par \par \par \par \par

## 2021-05-10 NOTE — COUNSELING
[Risk of tobacco use and health benefits of smoking cessation discussed] : Risk of tobacco use and health benefits of smoking cessation discussed [Willing to Quit Smoking] : Willing to quit smoking [Tobacco Use Cessation Intermediate Greater Than 3 Minutes Up to 10 Minutes] : Tobacco Use Cessation Intermediate Greater Than 3 Minutes Up to 10 Minutes [FreeTextEntry1] : 5

## 2021-05-11 ENCOUNTER — APPOINTMENT (OUTPATIENT)
Dept: HEMATOLOGY ONCOLOGY | Facility: CLINIC | Age: 71
End: 2021-05-11
Payer: COMMERCIAL

## 2021-05-11 VITALS
RESPIRATION RATE: 14 BRPM | DIASTOLIC BLOOD PRESSURE: 82 MMHG | BODY MASS INDEX: 18.35 KG/M2 | TEMPERATURE: 98 F | OXYGEN SATURATION: 98 % | SYSTOLIC BLOOD PRESSURE: 131 MMHG | HEART RATE: 88 BPM | WEIGHT: 127.87 LBS

## 2021-05-11 PROCEDURE — 99214 OFFICE O/P EST MOD 30 MIN: CPT

## 2021-05-12 NOTE — ASSESSMENT
[Palliative] : Goals of care discussed with patient: Palliative [FreeTextEntry1] : 69 yo with newly diagnosed NSCLC stage IV . PDL1 was 80% but tumor NGS could not be performed because of QNS. Blood NGS through [facility] revealed- HRAS and TP53 mutations.\par  \par Pt started with carbo/alimta/pembro as front line option. Completed 4 cycles wo AE. \par Now on  Keytruda alone monotherapy as maintenance . He has been tolerating well and will continue on q3w regimen. \par Colonoscopy on 3/19/21 . Normal Study per patient. Personally reviewed pathology. Confirmed no evidence for malignancy. \par Personally reviewed imaging from 4/2021 with patient. Continued response to therapy. Continue on Keytruda, next due 5/15\par OV in 6 weeks \par \par Case seen and discussed with Dr. Bell

## 2021-05-12 NOTE — HISTORY OF PRESENT ILLNESS
[Disease: _____________________] : Disease: [unfilled] [M: ___] : M[unfilled] [AJCC Stage: ____] : AJCC Stage: [unfilled] [de-identified] : 69 yo M with tobacco smoking hx and 911 exposure ( being followed at [facility],  was diagnosed with COPD/emphysema (dx soon after 9/11), had screening CT in 2019 which showed a lung mass. Work up at [facility]- solitary mass which was resected (benign-organizing PNA). He was referred to pulm grant a year ago by [facility] program but he made an appointment this year with Dr. Pritchard. He referred him for screening CT in Feb which was done in August and showed right upper lobe 3.7 x 3.8 cm mass with interval increase in size since January 7, 2019 worrisome for neoplasm possibly a primary lung neoplasm. Multiple bilateral smaller nodular opacities are new since January 7, 2019 may represent metastatic disease. Enlarged anterior mediastinal lymph node worrisome for metastatic disease. PET/CT 8/25/2020 IMPRESSION: Since outside PET/CT 1/30/2019: Considerable interval increased size and FDG avidity of a now spiculated right upper lobe mass with central necrosis suspicious for primary lung malignancy. New FDG avid enlarged mediastinal lymph nodes and bilateral pulmonary nodules probably metastatic disease. Indeterminate FDG avid left intraparotid focus, not well evaluated secondary to misregistration artifact, however appears new since prior PET/CT, possibly metastatic. He underwent CT guided bx by Dr. Barraza at [facility] which was suggestive of malignancy. He subsequently underwent bronch and EBUS/bx of level 4 LN which was c/w adeno ca, lung primary. He is still doing well from a symptomatic standpoint. He has lost some weight which he usually does every Summer. He denies fevers, chills, dyspnea, chest pain/tightness, cough, wheeze. Continues to work on a full-time basis as a . \par 10/27/20: pt has no new complaints. He had Ct scans and MRi of brain interval enlargement of multiple bilateral pulmonary masses and mediastinal adenopathy consistent with progressive disease. CT abdomen and MRI brain revealed no mets. \par \par 11/6/2020: Pt presents prior to starting treatment today to review regimen, address any concerns and provide written consent. He offers no new complaints. \par \par 11/27/2020: Pt returns for follow up. S/p first cycle. Tolerating well . NOT SMOKING!!!!! Doing well. Had one episode of near syncope (vaso-vagal). Pt states he was very dehydrated. No further episodes since. Pt states he "feels great"\par \par 12/18/2020: Pt returns for follow up. Tolerating treatment wo any AE. Only reports mild occasional fatigue. Scheduled for C#3 today\par \par 1/8/2021: Pt returns for follow up and discussion of findings on recent imaging. He is feeling well. Appetite good. Occasional fatigue. States he is losing his hair and is very unhappy about it. \par \par 1/14/21: Patient came to clinic for follow up today, he is s/p C4 Carbo/Alimta, Keytruda on 1/8/21, he reports worsening fatigue, poor appetite but stable weight, also reports  for past 2-3 days and  today, reports burning sensation feeling in chest that radiates to his throat and  nausea. Denies sob and skin rash. He did not take any BP meds today as his BP was in 100s He also experiences constipation. ECG was done in the office and showed some flip T waves in anterior leads. Plan to send him to [facility] to evaluate.\par \par 1/29/21: Pt had a prolonged recovery period after last treatment. Nausea, dysphagia, weight loss, and chest pain. As noted above, pt was hospitalized. No acute etiology was found and he was discharged home., He feels better now. Started eating again. CT scan in the hospital on 1/14 revealed  \par 1. Small right upper lobe pulmonary embolism. No evidence of pulmonary infarct or right heart strain.\par 2. Right upper lobe pulmonary mass and bilateral pulmonary nodules are unchanged from 01/05/2021.\par 3. There is extensive wall thickening in the proximal duodenum and within left upper quadrant small bowel loops, compatible with enteritis. Upper abdominal free fluid seen between the duodenum and pancreas and within the left upper quadrant, inferior to the pancreas, may be related to enteritis. Superimposed pancreatitis should be excluded clinically.\par 4. Free fluid in the right perinephric space, posterior-medial to the right kidney is new from 01/05/2021. Underlying genitourinary infection/pyelonephritis should be excluded clinically.\par \par He has lost a lot of weight since last visit. \par \par 2/19/21: Pt reports feeling much better. No ae from last treatment with monotherapy Keytruda. Pt has been seeing GI and cologard colon cancer assay was reported as positive and patient has been scheduled for colonoscopy on 3/19/21. No new complaints. \par \par 4/2/21: Pt returns for follow up. He is feeling well. States he had colonoscopy and endoscopy last week and "everything was normal." Tolerating treatment well. Offers no complaints. \par \par 4/23/21: Pt returns for follow up and discussion of findings on recent imaging. Offers no complaints. Feeling well. Scheduled for treatment today. \par \par 5/11/21: Pt feeling well, doing good on Keytruda. Weight is stable, has no complaints. [de-identified] : adeno ca [de-identified] : PDL1 80%

## 2021-05-14 ENCOUNTER — LABORATORY RESULT (OUTPATIENT)
Age: 71
End: 2021-05-14

## 2021-05-14 ENCOUNTER — RESULT REVIEW (OUTPATIENT)
Age: 71
End: 2021-05-14

## 2021-05-14 ENCOUNTER — APPOINTMENT (OUTPATIENT)
Dept: INFUSION THERAPY | Facility: HOSPITAL | Age: 71
End: 2021-05-14

## 2021-05-14 LAB
BASOPHILS # BLD AUTO: 0.05 K/UL — SIGNIFICANT CHANGE UP (ref 0–0.2)
BASOPHILS NFR BLD AUTO: 0.9 % — SIGNIFICANT CHANGE UP (ref 0–2)
BUN SERPL-MCNC: 22 MG/DL — SIGNIFICANT CHANGE UP (ref 7–23)
CA-I BLDA-SCNC: 1.24 MMOL/L — SIGNIFICANT CHANGE UP (ref 1.12–1.3)
CHLORIDE SERPL-SCNC: 104 MMOL/L — SIGNIFICANT CHANGE UP (ref 96–108)
CO2 SERPL-SCNC: 26 MMOL/L — SIGNIFICANT CHANGE UP (ref 22–31)
CREAT SERPL-MCNC: 1.3 MG/DL — SIGNIFICANT CHANGE UP (ref 0.5–1.3)
EOSINOPHIL # BLD AUTO: 0.05 K/UL — SIGNIFICANT CHANGE UP (ref 0–0.5)
EOSINOPHIL NFR BLD AUTO: 0.9 % — SIGNIFICANT CHANGE UP (ref 0–6)
GLUCOSE SERPL-MCNC: 121 MG/DL — HIGH (ref 70–99)
HCT VFR BLD CALC: 37.4 % — LOW (ref 39–50)
HGB BLD-MCNC: 12 G/DL — LOW (ref 13–17)
IMM GRANULOCYTES NFR BLD AUTO: 0.3 % — SIGNIFICANT CHANGE UP (ref 0–1.5)
LYMPHOCYTES # BLD AUTO: 1.25 K/UL — SIGNIFICANT CHANGE UP (ref 1–3.3)
LYMPHOCYTES # BLD AUTO: 21.9 % — SIGNIFICANT CHANGE UP (ref 13–44)
MCHC RBC-ENTMCNC: 30.6 PG — SIGNIFICANT CHANGE UP (ref 27–34)
MCHC RBC-ENTMCNC: 32.1 G/DL — SIGNIFICANT CHANGE UP (ref 32–36)
MCV RBC AUTO: 95.4 FL — SIGNIFICANT CHANGE UP (ref 80–100)
MONOCYTES # BLD AUTO: 0.41 K/UL — SIGNIFICANT CHANGE UP (ref 0–0.9)
MONOCYTES NFR BLD AUTO: 7.2 % — SIGNIFICANT CHANGE UP (ref 2–14)
NEUTROPHILS # BLD AUTO: 3.94 K/UL — SIGNIFICANT CHANGE UP (ref 1.8–7.4)
NEUTROPHILS NFR BLD AUTO: 68.8 % — SIGNIFICANT CHANGE UP (ref 43–77)
NRBC # BLD: 0 /100 WBCS — SIGNIFICANT CHANGE UP (ref 0–0)
PLATELET # BLD AUTO: 253 K/UL — SIGNIFICANT CHANGE UP (ref 150–400)
POTASSIUM SERPL-MCNC: 4.6 MMOL/L — SIGNIFICANT CHANGE UP (ref 3.5–5.3)
POTASSIUM SERPL-SCNC: 4.6 MMOL/L — SIGNIFICANT CHANGE UP (ref 3.5–5.3)
RBC # BLD: 3.92 M/UL — LOW (ref 4.2–5.8)
RBC # FLD: 12.2 % — SIGNIFICANT CHANGE UP (ref 10.3–14.5)
SODIUM SERPL-SCNC: 139 MMOL/L — SIGNIFICANT CHANGE UP (ref 135–145)
WBC # BLD: 5.72 K/UL — SIGNIFICANT CHANGE UP (ref 3.8–10.5)
WBC # FLD AUTO: 5.72 K/UL — SIGNIFICANT CHANGE UP (ref 3.8–10.5)

## 2021-05-17 DIAGNOSIS — R11.2 NAUSEA WITH VOMITING, UNSPECIFIED: ICD-10-CM

## 2021-05-17 DIAGNOSIS — Z51.11 ENCOUNTER FOR ANTINEOPLASTIC CHEMOTHERAPY: ICD-10-CM

## 2021-06-02 ENCOUNTER — FORM ENCOUNTER (OUTPATIENT)
Age: 71
End: 2021-06-02

## 2021-06-04 ENCOUNTER — RESULT REVIEW (OUTPATIENT)
Age: 71
End: 2021-06-04

## 2021-06-04 ENCOUNTER — LABORATORY RESULT (OUTPATIENT)
Age: 71
End: 2021-06-04

## 2021-06-04 ENCOUNTER — APPOINTMENT (OUTPATIENT)
Dept: INFUSION THERAPY | Facility: HOSPITAL | Age: 71
End: 2021-06-04

## 2021-06-04 ENCOUNTER — APPOINTMENT (OUTPATIENT)
Dept: HEMATOLOGY ONCOLOGY | Facility: CLINIC | Age: 71
End: 2021-06-04
Payer: COMMERCIAL

## 2021-06-04 VITALS
WEIGHT: 124.56 LBS | SYSTOLIC BLOOD PRESSURE: 132 MMHG | HEART RATE: 82 BPM | DIASTOLIC BLOOD PRESSURE: 79 MMHG | BODY MASS INDEX: 17.83 KG/M2 | TEMPERATURE: 97.3 F | OXYGEN SATURATION: 98 % | RESPIRATION RATE: 16 BRPM | HEIGHT: 70 IN

## 2021-06-04 LAB
BASOPHILS # BLD AUTO: 0.06 K/UL — SIGNIFICANT CHANGE UP (ref 0–0.2)
BASOPHILS # BLD AUTO: 0.07 K/UL — SIGNIFICANT CHANGE UP (ref 0–0.2)
BASOPHILS NFR BLD AUTO: 0.9 % — SIGNIFICANT CHANGE UP (ref 0–2)
BASOPHILS NFR BLD AUTO: 1 % — SIGNIFICANT CHANGE UP (ref 0–2)
EOSINOPHIL # BLD AUTO: 0.07 K/UL — SIGNIFICANT CHANGE UP (ref 0–0.5)
EOSINOPHIL # BLD AUTO: 0.12 K/UL — SIGNIFICANT CHANGE UP (ref 0–0.5)
EOSINOPHIL NFR BLD AUTO: 1.2 % — SIGNIFICANT CHANGE UP (ref 0–6)
EOSINOPHIL NFR BLD AUTO: 1.6 % — SIGNIFICANT CHANGE UP (ref 0–6)
HCT VFR BLD CALC: 37.5 % — LOW (ref 39–50)
HCT VFR BLD CALC: 45.5 % — SIGNIFICANT CHANGE UP (ref 39–50)
HGB BLD-MCNC: 12.5 G/DL — LOW (ref 13–17)
HGB BLD-MCNC: 14.7 G/DL — SIGNIFICANT CHANGE UP (ref 13–17)
IMM GRANULOCYTES NFR BLD AUTO: 0.3 % — SIGNIFICANT CHANGE UP (ref 0–1.5)
IMM GRANULOCYTES NFR BLD AUTO: 1.5 % — SIGNIFICANT CHANGE UP (ref 0–1.5)
LYMPHOCYTES # BLD AUTO: 1.41 K/UL — SIGNIFICANT CHANGE UP (ref 1–3.3)
LYMPHOCYTES # BLD AUTO: 2.01 K/UL — SIGNIFICANT CHANGE UP (ref 1–3.3)
LYMPHOCYTES # BLD AUTO: 24.2 % — SIGNIFICANT CHANGE UP (ref 13–44)
LYMPHOCYTES # BLD AUTO: 26.5 % — SIGNIFICANT CHANGE UP (ref 13–44)
MCHC RBC-ENTMCNC: 30.6 PG — SIGNIFICANT CHANGE UP (ref 27–34)
MCHC RBC-ENTMCNC: 30.8 PG — SIGNIFICANT CHANGE UP (ref 27–34)
MCHC RBC-ENTMCNC: 32.3 G/DL — SIGNIFICANT CHANGE UP (ref 32–36)
MCHC RBC-ENTMCNC: 33.3 G/DL — SIGNIFICANT CHANGE UP (ref 32–36)
MCV RBC AUTO: 92.4 FL — SIGNIFICANT CHANGE UP (ref 80–100)
MCV RBC AUTO: 94.6 FL — SIGNIFICANT CHANGE UP (ref 80–100)
MONOCYTES # BLD AUTO: 0.49 K/UL — SIGNIFICANT CHANGE UP (ref 0–0.9)
MONOCYTES # BLD AUTO: 0.8 K/UL — SIGNIFICANT CHANGE UP (ref 0–0.9)
MONOCYTES NFR BLD AUTO: 10.6 % — SIGNIFICANT CHANGE UP (ref 2–14)
MONOCYTES NFR BLD AUTO: 8.4 % — SIGNIFICANT CHANGE UP (ref 2–14)
NEUTROPHILS # BLD AUTO: 3.78 K/UL — SIGNIFICANT CHANGE UP (ref 1.8–7.4)
NEUTROPHILS # BLD AUTO: 4.47 K/UL — SIGNIFICANT CHANGE UP (ref 1.8–7.4)
NEUTROPHILS NFR BLD AUTO: 58.9 % — SIGNIFICANT CHANGE UP (ref 43–77)
NEUTROPHILS NFR BLD AUTO: 64.9 % — SIGNIFICANT CHANGE UP (ref 43–77)
NRBC # BLD: 0 /100 WBCS — SIGNIFICANT CHANGE UP (ref 0–0)
NRBC # BLD: 0 /100 WBCS — SIGNIFICANT CHANGE UP (ref 0–0)
PLATELET # BLD AUTO: 243 K/UL — SIGNIFICANT CHANGE UP (ref 150–400)
PLATELET # BLD AUTO: 264 K/UL — SIGNIFICANT CHANGE UP (ref 150–400)
RBC # BLD: 4.06 M/UL — LOW (ref 4.2–5.8)
RBC # BLD: 4.81 M/UL — SIGNIFICANT CHANGE UP (ref 4.2–5.8)
RBC # FLD: 12.7 % — SIGNIFICANT CHANGE UP (ref 10.3–14.5)
RBC # FLD: 12.8 % — SIGNIFICANT CHANGE UP (ref 10.3–14.5)
WBC # BLD: 5.83 K/UL — SIGNIFICANT CHANGE UP (ref 3.8–10.5)
WBC # BLD: 7.58 K/UL — SIGNIFICANT CHANGE UP (ref 3.8–10.5)
WBC # FLD AUTO: 5.83 K/UL — SIGNIFICANT CHANGE UP (ref 3.8–10.5)
WBC # FLD AUTO: 7.58 K/UL — SIGNIFICANT CHANGE UP (ref 3.8–10.5)

## 2021-06-04 PROCEDURE — 99214 OFFICE O/P EST MOD 30 MIN: CPT

## 2021-06-04 NOTE — HISTORY OF PRESENT ILLNESS
[Disease: _____________________] : Disease: [unfilled] [M: ___] : M[unfilled] [AJCC Stage: ____] : AJCC Stage: [unfilled] [de-identified] : 69 yo M with tobacco smoking hx and 911 exposure ( being followed [facility],  was diagnosed with COPD/emphysema (dx soon after 9/11), had screening CT in 2019 which showed a lung mass. Work up at [facility]- solitary mass which was resected (benign-organizing PNA). He was referred to pulm grant a year ago by [facility] program but he made an appointment this year with Dr. Pritchard. He referred him for screening CT in Feb which was done in August and showed right upper lobe 3.7 x 3.8 cm mass with interval increase in size since January 7, 2019 worrisome for neoplasm possibly a primary lung neoplasm. Multiple bilateral smaller nodular opacities are new since January 7, 2019 may represent metastatic disease. Enlarged anterior mediastinal lymph node worrisome for metastatic disease. PET/CT 8/25/2020 IMPRESSION: Since outside PET/CT 1/30/2019: Considerable interval increased size and FDG avidity of a now spiculated right upper lobe mass with central necrosis suspicious for primary lung malignancy. New FDG avid enlarged mediastinal lymph nodes and bilateral pulmonary nodules probably metastatic disease. Indeterminate FDG avid left intraparotid focus, not well evaluated secondary to misregistration artifact, however appears new since prior PET/CT, possibly metastatic. He underwent CT guided bx by Dr. Barraza at [facility] which was suggestive of malignancy. He subsequently underwent bronch and EBUS/bx of level 4 LN which was c/w adeno ca, lung primary. He is still doing well from a symptomatic standpoint. He has lost some weight which he usually does every Summer. He denies fevers, chills, dyspnea, chest pain/tightness, cough, wheeze. Continues to work on a full-time basis as a . \par 10/27/20: pt has no new complaints. He had Ct scans and MRi of brain interval enlargement of multiple bilateral pulmonary masses and mediastinal adenopathy consistent with progressive disease. CT abdomen and MRI brain revealed no mets. \par \par 11/6/2020: Pt presents prior to starting treatment today to review regimen, address any concerns and provide written consent. He offers no new complaints. \par \par 11/27/2020: Pt returns for follow up. S/p first cycle. Tolerating well . NOT SMOKING!!!!! Doing well. Had one episode of near syncope (vaso-vagal). Pt states he was very dehydrated. No further episodes since. Pt states he "feels great"\par \par 12/18/2020: Pt returns for follow up. Tolerating treatment wo any AE. Only reports mild occasional fatigue. Scheduled for C#3 today\par \par 1/8/2021: Pt returns for follow up and discussion of findings on recent imaging. He is feeling well. Appetite good. Occasional fatigue. States he is losing his hair and is very unhappy about it. \par \par 1/14/21: Patient came to clinic for follow up today, he is s/p C4 Carbo/Alimta, Keytruda on 1/8/21, he reports worsening fatigue, poor appetite but stable weight, also reports  for past 2-3 days and  today, reports burning sensation feeling in chest that radiates to his throat and  nausea. Denies sob and skin rash. He did not take any BP meds today as his BP was in 100s He also experiences constipation. ECG was done in the office and showed some flip T waves in anterior leads. Plan to send him to [facility] to evaluate.\par \par 1/29/21: Pt had a prolonged recovery period after last treatment. Nausea, dysphagia, weight loss, and chest pain. As noted above, pt was hospitalized. No acute etiology was found and he was discharged home., He feels better now. Started eating again. CT scan in the hospital on 1/14 revealed  \par 1. Small right upper lobe pulmonary embolism. No evidence of pulmonary infarct or right heart strain.\par 2. Right upper lobe pulmonary mass and bilateral pulmonary nodules are unchanged from 01/05/2021.\par 3. There is extensive wall thickening in the proximal duodenum and within left upper quadrant small bowel loops, compatible with enteritis. Upper abdominal free fluid seen between the duodenum and pancreas and within the left upper quadrant, inferior to the pancreas, may be related to enteritis. Superimposed pancreatitis should be excluded clinically.\par 4. Free fluid in the right perinephric space, posterior-medial to the right kidney is new from 01/05/2021. Underlying genitourinary infection/pyelonephritis should be excluded clinically.\par \par He has lost a lot of weight since last visit. \par \par 2/19/21: Pt reports feeling much better. No ae from last treatment with monotherapy Keytruda. Pt has been seeing GI and cologard colon cancer assay was reported as positive and patient has been scheduled for colonoscopy on 3/19/21. No new complaints. \par \par 4/2/21: Pt returns for follow up. He is feeling well. States he had colonoscopy and endoscopy last week and "everything was normal." Tolerating treatment well. Offers no complaints. \par \par 4/23/21: Pt returns for follow up and discussion of findings on recent imaging. Offers no complaints. Feeling well. Scheduled for treatment today. \par \par 5/11/21: Pt feeling well, doing good on Keytruda. Weight is stable, has no complaints.\par \par 6/4/21: pt rturns for f/u. Feels well. No irAE but pt describes extremely poor appetite. Losing weight. No other complaints [de-identified] : adeno ca [de-identified] : PDL1 80%

## 2021-06-04 NOTE — REVIEW OF SYSTEMS
[Fatigue] : fatigue [Recent Change In Weight] : ~T recent weight change [Negative] : Allergic/Immunologic [Chest Pain] : no chest pain [SOB on Exertion] : no shortness of breath during exertion

## 2021-06-04 NOTE — ASSESSMENT
[Palliative] : Goals of care discussed with patient: Palliative [FreeTextEntry1] : 69 yo with newly diagnosed NSCLC stage IV . PDL1 was 80% but tumor NGS could not be performed because of QNS. Blood NGS through [facility] revealed- HRAS and TP53 mutations.\par  \par Pt started with carbo/alimta/pembro as front line option. Completed 4 cycles wo AE. \par Now on  Keytruda alone monotherapy as maintenance . He has been tolerating well and will continue on q3w regimen. \par Colonoscopy on 3/19/21 . Normal Study per patient. Personally reviewed pathology. Confirmed no evidence for malignancy. \par Personally reviewed imaging from 4/2021 with patient. Continued response to therapy. Continue on Keytruda, next due 6/25/21\par repeat imaging in july. Will order next visit\par OV in 6 weeks \par \par Case seen and discussed with Dr. Bell

## 2021-06-23 ENCOUNTER — OUTPATIENT (OUTPATIENT)
Dept: OUTPATIENT SERVICES | Facility: HOSPITAL | Age: 71
LOS: 1 days | Discharge: HOME | End: 2021-06-23

## 2021-06-23 DIAGNOSIS — Z98.890 OTHER SPECIFIED POSTPROCEDURAL STATES: Chronic | ICD-10-CM

## 2021-06-23 DIAGNOSIS — C34.11 MALIGNANT NEOPLASM OF UPPER LOBE, RIGHT BRONCHUS OR LUNG: ICD-10-CM

## 2021-06-25 ENCOUNTER — APPOINTMENT (OUTPATIENT)
Dept: INFUSION THERAPY | Facility: HOSPITAL | Age: 71
End: 2021-06-25

## 2021-06-25 ENCOUNTER — LABORATORY RESULT (OUTPATIENT)
Age: 71
End: 2021-06-25

## 2021-06-25 ENCOUNTER — RESULT REVIEW (OUTPATIENT)
Age: 71
End: 2021-06-25

## 2021-06-25 LAB
BASOPHILS # BLD AUTO: 0.06 K/UL — SIGNIFICANT CHANGE UP (ref 0–0.2)
BASOPHILS NFR BLD AUTO: 1 % — SIGNIFICANT CHANGE UP (ref 0–2)
EOSINOPHIL # BLD AUTO: 0.07 K/UL — SIGNIFICANT CHANGE UP (ref 0–0.5)
EOSINOPHIL NFR BLD AUTO: 1.2 % — SIGNIFICANT CHANGE UP (ref 0–6)
HCT VFR BLD CALC: 40.2 % — SIGNIFICANT CHANGE UP (ref 39–50)
HGB BLD-MCNC: 12.9 G/DL — LOW (ref 13–17)
IMM GRANULOCYTES NFR BLD AUTO: 0.3 % — SIGNIFICANT CHANGE UP (ref 0–1.5)
LYMPHOCYTES # BLD AUTO: 1.24 K/UL — SIGNIFICANT CHANGE UP (ref 1–3.3)
LYMPHOCYTES # BLD AUTO: 20.4 % — SIGNIFICANT CHANGE UP (ref 13–44)
MCHC RBC-ENTMCNC: 30.5 PG — SIGNIFICANT CHANGE UP (ref 27–34)
MCHC RBC-ENTMCNC: 32.1 G/DL — SIGNIFICANT CHANGE UP (ref 32–36)
MCV RBC AUTO: 95 FL — SIGNIFICANT CHANGE UP (ref 80–100)
MONOCYTES # BLD AUTO: 0.46 K/UL — SIGNIFICANT CHANGE UP (ref 0–0.9)
MONOCYTES NFR BLD AUTO: 7.6 % — SIGNIFICANT CHANGE UP (ref 2–14)
NEUTROPHILS # BLD AUTO: 4.22 K/UL — SIGNIFICANT CHANGE UP (ref 1.8–7.4)
NEUTROPHILS NFR BLD AUTO: 69.5 % — SIGNIFICANT CHANGE UP (ref 43–77)
NRBC # BLD: 0 /100 WBCS — SIGNIFICANT CHANGE UP (ref 0–0)
PLATELET # BLD AUTO: 253 K/UL — SIGNIFICANT CHANGE UP (ref 150–400)
RBC # BLD: 4.23 M/UL — SIGNIFICANT CHANGE UP (ref 4.2–5.8)
RBC # FLD: 13.1 % — SIGNIFICANT CHANGE UP (ref 10.3–14.5)
WBC # BLD: 6.07 K/UL — SIGNIFICANT CHANGE UP (ref 3.8–10.5)
WBC # FLD AUTO: 6.07 K/UL — SIGNIFICANT CHANGE UP (ref 3.8–10.5)

## 2021-06-28 DIAGNOSIS — R11.2 NAUSEA WITH VOMITING, UNSPECIFIED: ICD-10-CM

## 2021-06-28 DIAGNOSIS — Z51.11 ENCOUNTER FOR ANTINEOPLASTIC CHEMOTHERAPY: ICD-10-CM

## 2021-07-02 ENCOUNTER — APPOINTMENT (OUTPATIENT)
Dept: HEMATOLOGY ONCOLOGY | Facility: CLINIC | Age: 71
End: 2021-07-02

## 2021-07-10 ENCOUNTER — RX RENEWAL (OUTPATIENT)
Age: 71
End: 2021-07-10

## 2021-07-12 ENCOUNTER — FORM ENCOUNTER (OUTPATIENT)
Age: 71
End: 2021-07-12

## 2021-07-16 ENCOUNTER — LABORATORY RESULT (OUTPATIENT)
Age: 71
End: 2021-07-16

## 2021-07-16 ENCOUNTER — RESULT REVIEW (OUTPATIENT)
Age: 71
End: 2021-07-16

## 2021-07-16 ENCOUNTER — APPOINTMENT (OUTPATIENT)
Dept: INFUSION THERAPY | Facility: HOSPITAL | Age: 71
End: 2021-07-16

## 2021-07-16 ENCOUNTER — APPOINTMENT (OUTPATIENT)
Dept: HEMATOLOGY ONCOLOGY | Facility: CLINIC | Age: 71
End: 2021-07-16
Payer: COMMERCIAL

## 2021-07-16 VITALS
DIASTOLIC BLOOD PRESSURE: 78 MMHG | HEART RATE: 76 BPM | BODY MASS INDEX: 17.71 KG/M2 | WEIGHT: 123.46 LBS | SYSTOLIC BLOOD PRESSURE: 121 MMHG | RESPIRATION RATE: 16 BRPM | OXYGEN SATURATION: 96 % | TEMPERATURE: 98 F

## 2021-07-16 LAB
BASOPHILS # BLD AUTO: 0 K/UL — SIGNIFICANT CHANGE UP (ref 0–0.2)
BASOPHILS NFR BLD AUTO: 0 % — SIGNIFICANT CHANGE UP (ref 0–2)
EOSINOPHIL # BLD AUTO: 0.07 K/UL — SIGNIFICANT CHANGE UP (ref 0–0.5)
EOSINOPHIL NFR BLD AUTO: 1 % — SIGNIFICANT CHANGE UP (ref 0–6)
HCT VFR BLD CALC: 36.6 % — LOW (ref 39–50)
HGB BLD-MCNC: 12 G/DL — LOW (ref 13–17)
LYMPHOCYTES # BLD AUTO: 1.76 K/UL — SIGNIFICANT CHANGE UP (ref 1–3.3)
LYMPHOCYTES # BLD AUTO: 27 % — SIGNIFICANT CHANGE UP (ref 13–44)
MCHC RBC-ENTMCNC: 31 PG — SIGNIFICANT CHANGE UP (ref 27–34)
MCHC RBC-ENTMCNC: 32.8 G/DL — SIGNIFICANT CHANGE UP (ref 32–36)
MCV RBC AUTO: 94.6 FL — SIGNIFICANT CHANGE UP (ref 80–100)
MONOCYTES # BLD AUTO: 0.59 K/UL — SIGNIFICANT CHANGE UP (ref 0–0.9)
MONOCYTES NFR BLD AUTO: 9 % — SIGNIFICANT CHANGE UP (ref 2–14)
NEUTROPHILS # BLD AUTO: 4.11 K/UL — SIGNIFICANT CHANGE UP (ref 1.8–7.4)
NEUTROPHILS NFR BLD AUTO: 63 % — SIGNIFICANT CHANGE UP (ref 43–77)
NRBC # BLD: 0 /100 — SIGNIFICANT CHANGE UP (ref 0–0)
NRBC # BLD: SIGNIFICANT CHANGE UP /100 WBCS (ref 0–0)
PLAT MORPH BLD: NORMAL — SIGNIFICANT CHANGE UP
PLATELET # BLD AUTO: 244 K/UL — SIGNIFICANT CHANGE UP (ref 150–400)
RBC # BLD: 3.87 M/UL — LOW (ref 4.2–5.8)
RBC # FLD: 13.2 % — SIGNIFICANT CHANGE UP (ref 10.3–14.5)
RBC BLD AUTO: SIGNIFICANT CHANGE UP
WBC # BLD: 6.53 K/UL — SIGNIFICANT CHANGE UP (ref 3.8–10.5)
WBC # FLD AUTO: 6.53 K/UL — SIGNIFICANT CHANGE UP (ref 3.8–10.5)

## 2021-07-16 PROCEDURE — 99214 OFFICE O/P EST MOD 30 MIN: CPT

## 2021-07-19 ENCOUNTER — NON-APPOINTMENT (OUTPATIENT)
Age: 71
End: 2021-07-19

## 2021-07-19 NOTE — HISTORY OF PRESENT ILLNESS
[Disease: _____________________] : Disease: [unfilled] [M: ___] : M[unfilled] [AJCC Stage: ____] : AJCC Stage: [unfilled] [de-identified] : 69 yo M with tobacco smoking hx and 911 exposure ( being followed at [facility],  was diagnosed with COPD/emphysema (dx soon after 9/11), had screening CT in 2019 which showed a lung mass. Work up at [facility]- solitary mass which was resected (benign-organizing PNA). He was referred to pulm grant a year ago by [facility] program but he made an appointment this year with Dr. Pritchard. He referred him for screening CT in Feb which was done in August and showed right upper lobe 3.7 x 3.8 cm mass with interval increase in size since January 7, 2019 worrisome for neoplasm possibly a primary lung neoplasm. Multiple bilateral smaller nodular opacities are new since January 7, 2019 may represent metastatic disease. Enlarged anterior mediastinal lymph node worrisome for metastatic disease. PET/CT 8/25/2020 IMPRESSION: Since outside PET/CT 1/30/2019: Considerable interval increased size and FDG avidity of a now spiculated right upper lobe mass with central necrosis suspicious for primary lung malignancy. New FDG avid enlarged mediastinal lymph nodes and bilateral pulmonary nodules probably metastatic disease. Indeterminate FDG avid left intraparotid focus, not well evaluated secondary to misregistration artifact, however appears new since prior PET/CT, possibly metastatic. He underwent CT guided bx by Dr. Barraza at [facility] which was suggestive of malignancy. He subsequently underwent bronch and EBUS/bx of level 4 LN which was c/w adeno ca, lung primary. He is still doing well from a symptomatic standpoint. He has lost some weight which he usually does every Summer. He denies fevers, chills, dyspnea, chest pain/tightness, cough, wheeze. Continues to work on a full-time basis as a . \par 10/27/20: pt has no new complaints. He had Ct scans and MRi of brain interval enlargement of multiple bilateral pulmonary masses and mediastinal adenopathy consistent with progressive disease. CT abdomen and MRI brain revealed no mets. \par \par 11/6/2020: Pt presents prior to starting treatment today to review regimen, address any concerns and provide written consent. He offers no new complaints. \par \par 11/27/2020: Pt returns for follow up. S/p first cycle. Tolerating well . NOT SMOKING!!!!! Doing well. Had one episode of near syncope (vaso-vagal). Pt states he was very dehydrated. No further episodes since. Pt states he "feels great"\par \par 12/18/2020: Pt returns for follow up. Tolerating treatment wo any AE. Only reports mild occasional fatigue. Scheduled for C#3 today\par \par 1/8/2021: Pt returns for follow up and discussion of findings on recent imaging. He is feeling well. Appetite good. Occasional fatigue. States he is losing his hair and is very unhappy about it. \par \par 1/14/21: Patient came to clinic for follow up today, he is s/p C4 Carbo/Alimta, Keytruda on 1/8/21, he reports worsening fatigue, poor appetite but stable weight, also reports  for past 2-3 days and  today, reports burning sensation feeling in chest that radiates to his throat and  nausea. Denies sob and skin rash. He did not take any BP meds today as his BP was in 100s He also experiences constipation. ECG was done in the office and showed some flip T waves in anterior leads. Plan to send him to [facility] to evaluate.\par \par 1/29/21: Pt had a prolonged recovery period after last treatment. Nausea, dysphagia, weight loss, and chest pain. As noted above, pt was hospitalized. No acute etiology was found and he was discharged home., He feels better now. Started eating again. CT scan in the hospital on 1/14 revealed  \par 1. Small right upper lobe pulmonary embolism. No evidence of pulmonary infarct or right heart strain.\par 2. Right upper lobe pulmonary mass and bilateral pulmonary nodules are unchanged from 01/05/2021.\par 3. There is extensive wall thickening in the proximal duodenum and within left upper quadrant small bowel loops, compatible with enteritis. Upper abdominal free fluid seen between the duodenum and pancreas and within the left upper quadrant, inferior to the pancreas, may be related to enteritis. Superimposed pancreatitis should be excluded clinically.\par 4. Free fluid in the right perinephric space, posterior-medial to the right kidney is new from 01/05/2021. Underlying genitourinary infection/pyelonephritis should be excluded clinically.\par \par He has lost a lot of weight since last visit. \par \par 2/19/21: Pt reports feeling much better. No ae from last treatment with monotherapy Keytruda. Pt has been seeing GI and cologard colon cancer assay was reported as positive and patient has been scheduled for colonoscopy on 3/19/21. No new complaints. \par \par 4/2/21: Pt returns for follow up. He is feeling well. States he had colonoscopy and endoscopy last week and "everything was normal." Tolerating treatment well. Offers no complaints. \par \par 4/23/21: Pt returns for follow up and discussion of findings on recent imaging. Offers no complaints. Feeling well. Scheduled for treatment today. \par \par 5/11/21: Pt feeling well, doing good on Keytruda. Weight is stable, has no complaints.\par \par 6/4/21: pt returns for f/u. Feels well. No irAE but pt describes extremely poor appetite. Losing weight. No other complaints\par \par 7/16/21:  Pt here for f/u. He is currently receiving  Keytruda monotherpy today with no irEAs. Pt denies SOB, rash, diarrhea, cough and itching.  He feels well and offers no complaints. Pt weight is stable and he verbalized increase appetite.   [de-identified] : adeno ca [de-identified] : PDL1 80%

## 2021-07-19 NOTE — ASSESSMENT
[Palliative] : Goals of care discussed with patient: Palliative [FreeTextEntry1] : 69 yo with newly diagnosed NSCLC stage IV . PDL1 was 80% but tumor NGS could not be performed because of QNS. Blood NGS through [facility] revealed- HRAS and TP53 mutations.\par  \par Pt started with carbo/alimta/pembro as front line option. Completed 4 cycles wo irAEs. \par Now on Keytruda monotherapy as maintenance. He has been tolerating well no irAEs.  Denies diarrhea, rash and SOB.  Will continue on Keytruda q3w regimen. \par Pt is due for imaging. CT chest and Abd with contrast ordered today. Pt to complete imaging prior to next visit\par F/U Bw\par OV in 6 weeks on 8/27/21\par

## 2021-07-26 ENCOUNTER — OUTPATIENT (OUTPATIENT)
Dept: OUTPATIENT SERVICES | Facility: HOSPITAL | Age: 71
LOS: 1 days | End: 2021-07-26
Payer: COMMERCIAL

## 2021-07-26 ENCOUNTER — APPOINTMENT (OUTPATIENT)
Dept: CT IMAGING | Facility: CLINIC | Age: 71
End: 2021-07-26
Payer: COMMERCIAL

## 2021-07-26 DIAGNOSIS — C34.90 MALIGNANT NEOPLASM OF UNSPECIFIED PART OF UNSPECIFIED BRONCHUS OR LUNG: ICD-10-CM

## 2021-07-26 DIAGNOSIS — Z98.890 OTHER SPECIFIED POSTPROCEDURAL STATES: Chronic | ICD-10-CM

## 2021-07-26 PROCEDURE — 71260 CT THORAX DX C+: CPT | Mod: 26

## 2021-07-26 PROCEDURE — 74160 CT ABDOMEN W/CONTRAST: CPT

## 2021-07-26 PROCEDURE — 71260 CT THORAX DX C+: CPT

## 2021-07-26 PROCEDURE — 74160 CT ABDOMEN W/CONTRAST: CPT | Mod: 26

## 2021-07-27 ENCOUNTER — APPOINTMENT (OUTPATIENT)
Dept: OTHER | Facility: CLINIC | Age: 71
End: 2021-07-27
Payer: COMMERCIAL

## 2021-07-27 DIAGNOSIS — C34.90 MALIGNANT NEOPLASM OF UNSPECIFIED PART OF UNSPECIFIED BRONCHUS OR LUNG: ICD-10-CM

## 2021-07-27 PROCEDURE — 99397 PER PM REEVAL EST PAT 65+ YR: CPT | Mod: 95

## 2021-07-27 PROCEDURE — 99442: CPT | Mod: 95

## 2021-07-27 RX ORDER — SODIUM SULFATE, POTASSIUM SULFATE, MAGNESIUM SULFATE 17.5; 3.13; 1.6 G/ML; G/ML; G/ML
17.5-3.13-1.6 SOLUTION, CONCENTRATE ORAL
Qty: 1 | Refills: 0 | Status: COMPLETED | COMMUNITY
Start: 2021-02-17 | End: 2021-03-17

## 2021-08-05 ENCOUNTER — OUTPATIENT (OUTPATIENT)
Dept: OUTPATIENT SERVICES | Facility: HOSPITAL | Age: 71
LOS: 1 days | Discharge: HOME | End: 2021-08-05

## 2021-08-05 DIAGNOSIS — C34.11 MALIGNANT NEOPLASM OF UPPER LOBE, RIGHT BRONCHUS OR LUNG: ICD-10-CM

## 2021-08-05 DIAGNOSIS — Z98.890 OTHER SPECIFIED POSTPROCEDURAL STATES: Chronic | ICD-10-CM

## 2021-08-06 ENCOUNTER — RESULT REVIEW (OUTPATIENT)
Age: 71
End: 2021-08-06

## 2021-08-06 ENCOUNTER — APPOINTMENT (OUTPATIENT)
Dept: INFUSION THERAPY | Facility: HOSPITAL | Age: 71
End: 2021-08-06

## 2021-08-06 ENCOUNTER — LABORATORY RESULT (OUTPATIENT)
Age: 71
End: 2021-08-06

## 2021-08-06 LAB
BASOPHILS # BLD AUTO: 0.07 K/UL — SIGNIFICANT CHANGE UP (ref 0–0.2)
BASOPHILS NFR BLD AUTO: 1.2 % — SIGNIFICANT CHANGE UP (ref 0–2)
EOSINOPHIL # BLD AUTO: 0.12 K/UL — SIGNIFICANT CHANGE UP (ref 0–0.5)
EOSINOPHIL NFR BLD AUTO: 2.1 % — SIGNIFICANT CHANGE UP (ref 0–6)
HCT VFR BLD CALC: 42.7 % — SIGNIFICANT CHANGE UP (ref 39–50)
HGB BLD-MCNC: 13.7 G/DL — SIGNIFICANT CHANGE UP (ref 13–17)
IMM GRANULOCYTES NFR BLD AUTO: 0.5 % — SIGNIFICANT CHANGE UP (ref 0–1.5)
LYMPHOCYTES # BLD AUTO: 1.59 K/UL — SIGNIFICANT CHANGE UP (ref 1–3.3)
LYMPHOCYTES # BLD AUTO: 27.3 % — SIGNIFICANT CHANGE UP (ref 13–44)
MCHC RBC-ENTMCNC: 30.9 PG — SIGNIFICANT CHANGE UP (ref 27–34)
MCHC RBC-ENTMCNC: 32.1 G/DL — SIGNIFICANT CHANGE UP (ref 32–36)
MCV RBC AUTO: 96.4 FL — SIGNIFICANT CHANGE UP (ref 80–100)
MONOCYTES # BLD AUTO: 0.5 K/UL — SIGNIFICANT CHANGE UP (ref 0–0.9)
MONOCYTES NFR BLD AUTO: 8.6 % — SIGNIFICANT CHANGE UP (ref 2–14)
NEUTROPHILS # BLD AUTO: 3.52 K/UL — SIGNIFICANT CHANGE UP (ref 1.8–7.4)
NEUTROPHILS NFR BLD AUTO: 60.3 % — SIGNIFICANT CHANGE UP (ref 43–77)
NRBC # BLD: 0 /100 WBCS — SIGNIFICANT CHANGE UP (ref 0–0)
PLATELET # BLD AUTO: 262 K/UL — SIGNIFICANT CHANGE UP (ref 150–400)
RBC # BLD: 4.43 M/UL — SIGNIFICANT CHANGE UP (ref 4.2–5.8)
RBC # FLD: 13.1 % — SIGNIFICANT CHANGE UP (ref 10.3–14.5)
WBC # BLD: 5.83 K/UL — SIGNIFICANT CHANGE UP (ref 3.8–10.5)
WBC # FLD AUTO: 5.83 K/UL — SIGNIFICANT CHANGE UP (ref 3.8–10.5)

## 2021-08-08 ENCOUNTER — FORM ENCOUNTER (OUTPATIENT)
Age: 71
End: 2021-08-08

## 2021-08-09 ENCOUNTER — APPOINTMENT (OUTPATIENT)
Dept: PULMONOLOGY | Facility: CLINIC | Age: 71
End: 2021-08-09
Payer: COMMERCIAL

## 2021-08-09 VITALS
TEMPERATURE: 97.6 F | BODY MASS INDEX: 17.47 KG/M2 | RESPIRATION RATE: 15 BRPM | DIASTOLIC BLOOD PRESSURE: 80 MMHG | SYSTOLIC BLOOD PRESSURE: 128 MMHG | WEIGHT: 122 LBS | HEART RATE: 78 BPM | OXYGEN SATURATION: 98 % | HEIGHT: 70 IN

## 2021-08-09 DIAGNOSIS — R11.2 NAUSEA WITH VOMITING, UNSPECIFIED: ICD-10-CM

## 2021-08-09 DIAGNOSIS — Z51.11 ENCOUNTER FOR ANTINEOPLASTIC CHEMOTHERAPY: ICD-10-CM

## 2021-08-09 PROCEDURE — 99214 OFFICE O/P EST MOD 30 MIN: CPT

## 2021-08-09 NOTE — ASSESSMENT
[FreeTextEntry1] : 1. COPD: DOing well, renewed trelegy\par \par 2. Lung ca: Good response to Keytruda, dominant lung mass smaller\par \par 3. Hypertension: Will hold amlodipine, monitor bp at home\par \par 4. Smoking: Quit 1 year ago\par \par 5. Immunizations: Had pneumovax 2020

## 2021-08-09 NOTE — HISTORY OF PRESENT ILLNESS
[Former] : former [TextBox_4] : Patient doing extremely well.  Feels great. Appetite improved. Only issue is dyspnea when walking three flights to his parked car at [facility] as . \par \par Has been on amolodipine but wonders if he needs now that he has lost 20 lbs.

## 2021-08-09 NOTE — PHYSICAL EXAM
[No Acute Distress] : no acute distress [Normal Oropharynx] : normal oropharynx [Normal Appearance] : normal appearance [No Neck Mass] : no neck mass [Normal Rate/Rhythm] : normal rate/rhythm [Normal S1, S2] : normal s1, s2 [No Murmurs] : no murmurs [No Resp Distress] : no resp distress [Clear to Auscultation Bilaterally] : clear to auscultation bilaterally [No Abnormalities] : no abnormalities [Benign] : benign [Normal Gait] : normal gait [No Clubbing] : no clubbing [No Cyanosis] : no cyanosis [No Edema] : no edema [FROM] : FROM [Normal Color/ Pigmentation] : normal color/ pigmentation [No Focal Deficits] : no focal deficits [Oriented x3] : oriented x3 [Normal Affect] : normal affect [TextBox_68] : decreased breath sounds

## 2021-08-16 ENCOUNTER — RX RENEWAL (OUTPATIENT)
Age: 71
End: 2021-08-16

## 2021-08-27 ENCOUNTER — LABORATORY RESULT (OUTPATIENT)
Age: 71
End: 2021-08-27

## 2021-08-27 ENCOUNTER — APPOINTMENT (OUTPATIENT)
Dept: INFUSION THERAPY | Facility: HOSPITAL | Age: 71
End: 2021-08-27

## 2021-08-27 ENCOUNTER — RESULT REVIEW (OUTPATIENT)
Age: 71
End: 2021-08-27

## 2021-08-27 ENCOUNTER — APPOINTMENT (OUTPATIENT)
Dept: HEMATOLOGY ONCOLOGY | Facility: CLINIC | Age: 71
End: 2021-08-27
Payer: COMMERCIAL

## 2021-08-27 VITALS
HEART RATE: 79 BPM | RESPIRATION RATE: 18 BRPM | WEIGHT: 123.46 LBS | SYSTOLIC BLOOD PRESSURE: 118 MMHG | DIASTOLIC BLOOD PRESSURE: 69 MMHG | TEMPERATURE: 97.5 F | OXYGEN SATURATION: 98 % | BODY MASS INDEX: 17.71 KG/M2

## 2021-08-27 LAB
BASOPHILS # BLD AUTO: 0.07 K/UL — SIGNIFICANT CHANGE UP (ref 0–0.2)
BASOPHILS NFR BLD AUTO: 1.1 % — SIGNIFICANT CHANGE UP (ref 0–2)
EOSINOPHIL # BLD AUTO: 0.21 K/UL — SIGNIFICANT CHANGE UP (ref 0–0.5)
EOSINOPHIL NFR BLD AUTO: 3.4 % — SIGNIFICANT CHANGE UP (ref 0–6)
HCT VFR BLD CALC: 39.9 % — SIGNIFICANT CHANGE UP (ref 39–50)
HGB BLD-MCNC: 13.1 G/DL — SIGNIFICANT CHANGE UP (ref 13–17)
IMM GRANULOCYTES NFR BLD AUTO: 0.3 % — SIGNIFICANT CHANGE UP (ref 0–1.5)
LYMPHOCYTES # BLD AUTO: 1.65 K/UL — SIGNIFICANT CHANGE UP (ref 1–3.3)
LYMPHOCYTES # BLD AUTO: 26.7 % — SIGNIFICANT CHANGE UP (ref 13–44)
MCHC RBC-ENTMCNC: 31.3 PG — SIGNIFICANT CHANGE UP (ref 27–34)
MCHC RBC-ENTMCNC: 32.8 G/DL — SIGNIFICANT CHANGE UP (ref 32–36)
MCV RBC AUTO: 95.2 FL — SIGNIFICANT CHANGE UP (ref 80–100)
MONOCYTES # BLD AUTO: 0.49 K/UL — SIGNIFICANT CHANGE UP (ref 0–0.9)
MONOCYTES NFR BLD AUTO: 7.9 % — SIGNIFICANT CHANGE UP (ref 2–14)
NEUTROPHILS # BLD AUTO: 3.73 K/UL — SIGNIFICANT CHANGE UP (ref 1.8–7.4)
NEUTROPHILS NFR BLD AUTO: 60.6 % — SIGNIFICANT CHANGE UP (ref 43–77)
NRBC # BLD: 0 /100 WBCS — SIGNIFICANT CHANGE UP (ref 0–0)
PLATELET # BLD AUTO: 257 K/UL — SIGNIFICANT CHANGE UP (ref 150–400)
RBC # BLD: 4.19 M/UL — LOW (ref 4.2–5.8)
RBC # FLD: 12.8 % — SIGNIFICANT CHANGE UP (ref 10.3–14.5)
WBC # BLD: 6.17 K/UL — SIGNIFICANT CHANGE UP (ref 3.8–10.5)
WBC # FLD AUTO: 6.17 K/UL — SIGNIFICANT CHANGE UP (ref 3.8–10.5)

## 2021-08-27 PROCEDURE — 99214 OFFICE O/P EST MOD 30 MIN: CPT

## 2021-08-27 NOTE — REVIEW OF SYSTEMS
[Fatigue] : fatigue [Recent Change In Weight] : ~T recent weight change [Chest Pain] : no chest pain [SOB on Exertion] : no shortness of breath during exertion [Negative] : Allergic/Immunologic

## 2021-08-27 NOTE — ASSESSMENT
[Palliative] : Goals of care discussed with patient: Palliative [FreeTextEntry1] : 69 yo with newly diagnosed NSCLC stage IV . PDL1 was 80% but tumor NGS could not be performed because of QNS. Blood NGS through [facility] revealed- HRAS and TP53 mutations.\par  \par Pt started with carbo/alimta/pembro as front line option. Completed 4 cycles wo irAEs. \par Now on Keytruda monotherapy as maintenance. He has been tolerating well no irAEs.  Denies diarrhea, rash and SOB.   \par Personally reviewed imaging with patient from last month. Stable disease.\par Will continue on Keytruda q3w regimen.\par F/U Bw\par OV in 6 weeks \par

## 2021-08-27 NOTE — HISTORY OF PRESENT ILLNESS
[Disease: _____________________] : Disease: [unfilled] [M: ___] : M[unfilled] [AJCC Stage: ____] : AJCC Stage: [unfilled] [de-identified] : 71 yo M with tobacco smoking hx and 911 exposure ( being followed at 911 clinic,  was diagnosed with COPD/emphysema (dx soon after 9/11), had screening CT in 2019 which showed a lung mass. Work up at [facility]- solitary mass which was resected (benign-organizing PNA). He was referred to pulm grant a year ago by [facility] program but he made an appointment this year with Dr. Pritchard. He referred him for screening CT in Feb which was done in August and showed right upper lobe 3.7 x 3.8 cm mass with interval increase in size since January 7, 2019 worrisome for neoplasm possibly a primary lung neoplasm. Multiple bilateral smaller nodular opacities are new since January 7, 2019 may represent metastatic disease. Enlarged anterior mediastinal lymph node worrisome for metastatic disease. PET/CT 8/25/2020 IMPRESSION: Since outside PET/CT 1/30/2019: Considerable interval increased size and FDG avidity of a now spiculated right upper lobe mass with central necrosis suspicious for primary lung malignancy. New FDG avid enlarged mediastinal lymph nodes and bilateral pulmonary nodules probably metastatic disease. Indeterminate FDG avid left intraparotid focus, not well evaluated secondary to misregistration artifact, however appears new since prior PET/CT, possibly metastatic. He underwent CT guided bx by Dr. Barraza at [facility] which was suggestive of malignancy. He subsequently underwent bronch and EBUS/bx of level 4 LN which was c/w adeno ca, lung primary. He is still doing well from a symptomatic standpoint. He has lost some weight which he usually does every Summer. He denies fevers, chills, dyspnea, chest pain/tightness, cough, wheeze. Continues to work on a full-time basis as a . \par 10/27/20: pt has no new complaints. He had Ct scans and MRi of brain interval enlargement of multiple bilateral pulmonary masses and mediastinal adenopathy consistent with progressive disease. CT abdomen and MRI brain revealed no mets. \par \par 11/6/2020: Pt presents prior to starting treatment today to review regimen, address any concerns and provide written consent. He offers no new complaints. \par \par 11/27/2020: Pt returns for follow up. S/p first cycle. Tolerating well . NOT SMOKING!!!!! Doing well. Had one episode of near syncope (vaso-vagal). Pt states he was very dehydrated. No further episodes since. Pt states he "feels great"\par \par 12/18/2020: Pt returns for follow up. Tolerating treatment wo any AE. Only reports mild occasional fatigue. Scheduled for C#3 today\par \par 1/8/2021: Pt returns for follow up and discussion of findings on recent imaging. He is feeling well. Appetite good. Occasional fatigue. States he is losing his hair and is very unhappy about it. \par \par 1/14/21: Patient came to clinic for follow up today, he is s/p C4 Carbo/Alimta, Keytruda on 1/8/21, he reports worsening fatigue, poor appetite but stable weight, also reports  for past 2-3 days and  today, reports burning sensation feeling in chest that radiates to his throat and  nausea. Denies sob and skin rash. He did not take any BP meds today as his BP was in 100s He also experiences constipation. ECG was done in the office and showed some flip T waves in anterior leads. Plan to send him to [facility] to evaluate.\par \par 1/29/21: Pt had a prolonged recovery period after last treatment. Nausea, dysphagia, weight loss, and chest pain. As noted above, pt was hospitalized. No acute etiology was found and he was discharged home., He feels better now. Started eating again. CT scan in the hospital on 1/14 revealed  \par 1. Small right upper lobe pulmonary embolism. No evidence of pulmonary infarct or right heart strain.\par 2. Right upper lobe pulmonary mass and bilateral pulmonary nodules are unchanged from 01/05/2021.\par 3. There is extensive wall thickening in the proximal duodenum and within left upper quadrant small bowel loops, compatible with enteritis. Upper abdominal free fluid seen between the duodenum and pancreas and within the left upper quadrant, inferior to the pancreas, may be related to enteritis. Superimposed pancreatitis should be excluded clinically.\par 4. Free fluid in the right perinephric space, posterior-medial to the right kidney is new from 01/05/2021. Underlying genitourinary infection/pyelonephritis should be excluded clinically.\par \par He has lost a lot of weight since last visit. \par \par 2/19/21: Pt reports feeling much better. No ae from last treatment with monotherapy Keytruda. Pt has been seeing GI and cologard colon cancer assay was reported as positive and patient has been scheduled for colonoscopy on 3/19/21. No new complaints. \par \par 4/2/21: Pt returns for follow up. He is feeling well. States he had colonoscopy and endoscopy last week and "everything was normal." Tolerating treatment well. Offers no complaints. \par \par 4/23/21: Pt returns for follow up and discussion of findings on recent imaging. Offers no complaints. Feeling well. Scheduled for treatment today. \par \par 5/11/21: Pt feeling well, doing good on Keytruda. Weight is stable, has no complaints.\par \par 6/4/21: pt returns for f/u. Feels well. No irAE but pt describes extremely poor appetite. Losing weight. No other complaints\par \par 7/16/21:  Pt here for f/u. He is currently receiving  Keytruda monotherpy today with no irEAs. Pt denies SOB, rash, diarrhea, cough and itching.  He feels well and offers no complaints. Pt weight is stable and he verbalized increase appetite.  \par \par 8/27/21: Pt returns for follow up. He is feeling well but expresses concern about not being able to gain weight. No other concerns /complaints. [de-identified] : adeno ca [de-identified] : PDL1 80%

## 2021-08-30 ENCOUNTER — NON-APPOINTMENT (OUTPATIENT)
Age: 71
End: 2021-08-30

## 2021-09-07 ENCOUNTER — NON-APPOINTMENT (OUTPATIENT)
Age: 71
End: 2021-09-07

## 2021-09-10 ENCOUNTER — RESULT REVIEW (OUTPATIENT)
Age: 71
End: 2021-09-10

## 2021-09-10 ENCOUNTER — OUTPATIENT (OUTPATIENT)
Dept: OUTPATIENT SERVICES | Facility: HOSPITAL | Age: 71
LOS: 1 days | Discharge: HOME | End: 2021-09-10

## 2021-09-10 ENCOUNTER — APPOINTMENT (OUTPATIENT)
Dept: CARDIOLOGY | Facility: CLINIC | Age: 71
End: 2021-09-10
Payer: COMMERCIAL

## 2021-09-10 VITALS
DIASTOLIC BLOOD PRESSURE: 84 MMHG | WEIGHT: 121.25 LBS | SYSTOLIC BLOOD PRESSURE: 143 MMHG | HEART RATE: 78 BPM | OXYGEN SATURATION: 95 % | BODY MASS INDEX: 17.36 KG/M2 | RESPIRATION RATE: 16 BRPM | TEMPERATURE: 96.8 F | HEIGHT: 70 IN

## 2021-09-10 VITALS — SYSTOLIC BLOOD PRESSURE: 125 MMHG | DIASTOLIC BLOOD PRESSURE: 82 MMHG

## 2021-09-10 DIAGNOSIS — Z98.890 OTHER SPECIFIED POSTPROCEDURAL STATES: Chronic | ICD-10-CM

## 2021-09-10 DIAGNOSIS — C34.11 MALIGNANT NEOPLASM OF UPPER LOBE, RIGHT BRONCHUS OR LUNG: ICD-10-CM

## 2021-09-10 LAB
BASOPHILS # BLD AUTO: 0.08 K/UL — SIGNIFICANT CHANGE UP (ref 0–0.2)
BASOPHILS NFR BLD AUTO: 1.2 % — SIGNIFICANT CHANGE UP (ref 0–2)
EOSINOPHIL # BLD AUTO: 0.32 K/UL — SIGNIFICANT CHANGE UP (ref 0–0.5)
EOSINOPHIL NFR BLD AUTO: 4.9 % — SIGNIFICANT CHANGE UP (ref 0–6)
HCT VFR BLD CALC: 41.9 % — SIGNIFICANT CHANGE UP (ref 39–50)
HGB BLD-MCNC: 13.5 G/DL — SIGNIFICANT CHANGE UP (ref 13–17)
IMM GRANULOCYTES NFR BLD AUTO: 0.3 % — SIGNIFICANT CHANGE UP (ref 0–1.5)
LYMPHOCYTES # BLD AUTO: 1.65 K/UL — SIGNIFICANT CHANGE UP (ref 1–3.3)
LYMPHOCYTES # BLD AUTO: 25.1 % — SIGNIFICANT CHANGE UP (ref 13–44)
MCHC RBC-ENTMCNC: 30.9 PG — SIGNIFICANT CHANGE UP (ref 27–34)
MCHC RBC-ENTMCNC: 32.2 G/DL — SIGNIFICANT CHANGE UP (ref 32–36)
MCV RBC AUTO: 95.9 FL — SIGNIFICANT CHANGE UP (ref 80–100)
MONOCYTES # BLD AUTO: 0.68 K/UL — SIGNIFICANT CHANGE UP (ref 0–0.9)
MONOCYTES NFR BLD AUTO: 10.3 % — SIGNIFICANT CHANGE UP (ref 2–14)
NEUTROPHILS # BLD AUTO: 3.83 K/UL — SIGNIFICANT CHANGE UP (ref 1.8–7.4)
NEUTROPHILS NFR BLD AUTO: 58.2 % — SIGNIFICANT CHANGE UP (ref 43–77)
NRBC # BLD: 0 /100 WBCS — SIGNIFICANT CHANGE UP (ref 0–0)
NT-PROBNP SERPL-MCNC: 505 PG/ML
PLATELET # BLD AUTO: 252 K/UL — SIGNIFICANT CHANGE UP (ref 150–400)
RBC # BLD: 4.37 M/UL — SIGNIFICANT CHANGE UP (ref 4.2–5.8)
RBC # FLD: 12.7 % — SIGNIFICANT CHANGE UP (ref 10.3–14.5)
WBC # BLD: 6.58 K/UL — SIGNIFICANT CHANGE UP (ref 3.8–10.5)
WBC # FLD AUTO: 6.58 K/UL — SIGNIFICANT CHANGE UP (ref 3.8–10.5)

## 2021-09-10 PROCEDURE — 99215 OFFICE O/P EST HI 40 MIN: CPT | Mod: 25

## 2021-09-10 PROCEDURE — 99406 BEHAV CHNG SMOKING 3-10 MIN: CPT

## 2021-09-10 PROCEDURE — 93000 ELECTROCARDIOGRAM COMPLETE: CPT

## 2021-09-10 RX ORDER — PANTOPRAZOLE 40 MG/1
40 TABLET, DELAYED RELEASE ORAL DAILY
Qty: 30 | Refills: 2 | Status: DISCONTINUED | COMMUNITY
Start: 2021-01-19 | End: 2021-09-10

## 2021-09-10 NOTE — CARDIOLOGY SUMMARY
[de-identified] : 9/10/2021: sinus rhythm 73 bpm, RBBB. Unchanged from prior\par 5/10/2021: sinus rhythm at 86bpm with RBBB, unchanged from prior  [de-identified] : Echo: 02/08/2021: technically difficult, MAC, mild MR. Mild LV dysfunction. No pericardial effusion.

## 2021-09-10 NOTE — REASON FOR VISIT
[CV Risk Factors and Non-Cardiac Disease] : CV risk factors and non-cardiac disease [FreeTextEntry3] : Dr. Bell

## 2021-09-10 NOTE — ASSESSMENT
[FreeTextEntry1] : 71 year old man with lung cancer s/p carbo/pemetrexed, on PD-1 inhibitor (pembrolizumab) since 11/2020, CAD risk factors including smoking, 9-11 exposure, immunotherapy, hypertension with coronary calcifications on CT scan.\par \par I reviewed recent laboratory tests including lipids and BNP with Mr. Lam today. Discussed importance of stopping cigarette smoking entirely.\par \par CAD risk factor modification is important given CACs and risk factor profile, particularly given recent data demonstrating atherosclerosis progression following immunotherapy. \par \par Blood pressure is normal on repeat assessment today. Continue amlodipine 10 mg daily. At some point he should be referred for AAA screening. Continue BP monitoring.\par \par For recent acute PE, will continue apixaban 5 mg BID. Discussed risk/benefit of VTE prevention given active cancer.\par \par To continue rosuvastatin 5 mg daily given coronary calcifications noted on CT, LDL is 71.\par \par Mild LV dysfunction by echo (limited windows), elevated pro-BNP. Will repeat pro-BNP today. Discussed importance of ongoing monitoring for cardiotoxicity/late effects of immunotherapy.\par \par \par Follow up with me in 4 months, earlier if any new symptoms.\par \par \par

## 2021-09-10 NOTE — HISTORY OF PRESENT ILLNESS
[FreeTextEntry1] : 71 year old man with WTC exposure (), smoker, hypertension, metastatic lung adenoCA s/p carbo/pemetrexed and pembrolizumab (11/2020-present), pulmonary embolism (Jan 2021), and coronary artery calcifications by CT who is seen for follow up.\par \par Interval History:\par Feels well. Continues on pembrolizumab plan for total 24 months. Good disease response to date. Still smoking, but less than 5 per week. No  chest pain, no palps, no change in ET. No bleeding. Dyspnea on exertion is stable. \par \par History:\par At oncology visit 1/14 reported chest discomfort and labile blood pressure with hypertension at home, noted to have new TWI on his ECG and sent to ER for workup. In ER Hs Annika was 27-25-21 ng/L (negative), BNP 5716 (elevated). ECG RBBB, chronic. CT-Angiogram showed new segmental PE, started on anticoagulation with enoxaparin and transitioned to apixaban.\par \par Also noted on the CT were coronary artery calcification and signs of colitis. He was prescribed a course of cipro/flagyl for this by the ER, which he feels did nothing. He denies any chest pain or new shortness of breath. He denies any dizziness, palpitations, syncope, or near syncope. He had mild esophagitis from PD-1 inhibitor initially, which resolved.\par \par He denies any history of heart disease but has right bundle branch block on the ECG, which is chronic. Work as a  causes him moderate stress, and he smoked until cancer diagnosis.\par \par He tells me he takes amlodipine for HTN, but is no longer taking HCTZ or losartan. No recent stress test.\par \par Compliant with apixaban, notes some ecchymoses.\par \par \par \par \par May 2021:\par \par He feels well and denies any new cardiac symptoms or complaints. He is taking medications as directed without any notable adverse effects. Continues on immunotherapy with PD-1 inhibitor, every 3 weeks, tolerating well. He smoked 1 or 2 cigarettes in the past month and has some second hand smoke exposure.\par \par No new chest pain, palps, dizziness/lightheadedness.\par \par \par \par \par

## 2021-09-13 ENCOUNTER — APPOINTMENT (OUTPATIENT)
Dept: CARDIOLOGY | Facility: CLINIC | Age: 71
End: 2021-09-13

## 2021-09-16 ENCOUNTER — FORM ENCOUNTER (OUTPATIENT)
Age: 71
End: 2021-09-16

## 2021-09-17 ENCOUNTER — NON-APPOINTMENT (OUTPATIENT)
Age: 71
End: 2021-09-17

## 2021-09-17 ENCOUNTER — APPOINTMENT (OUTPATIENT)
Dept: HEMATOLOGY ONCOLOGY | Facility: CLINIC | Age: 71
End: 2021-09-17
Payer: COMMERCIAL

## 2021-09-17 ENCOUNTER — RESULT REVIEW (OUTPATIENT)
Age: 71
End: 2021-09-17

## 2021-09-17 ENCOUNTER — LABORATORY RESULT (OUTPATIENT)
Age: 71
End: 2021-09-17

## 2021-09-17 ENCOUNTER — APPOINTMENT (OUTPATIENT)
Dept: INFUSION THERAPY | Facility: HOSPITAL | Age: 71
End: 2021-09-17

## 2021-09-17 VITALS
BODY MASS INDEX: 17.8 KG/M2 | HEART RATE: 51 BPM | WEIGHT: 124.34 LBS | TEMPERATURE: 97.1 F | RESPIRATION RATE: 16 BRPM | SYSTOLIC BLOOD PRESSURE: 145 MMHG | HEIGHT: 70 IN | DIASTOLIC BLOOD PRESSURE: 86 MMHG | OXYGEN SATURATION: 99 %

## 2021-09-17 LAB
BASOPHILS # BLD AUTO: 0.08 K/UL — SIGNIFICANT CHANGE UP (ref 0–0.2)
BASOPHILS NFR BLD AUTO: 1.3 % — SIGNIFICANT CHANGE UP (ref 0–2)
EOSINOPHIL # BLD AUTO: 0.46 K/UL — SIGNIFICANT CHANGE UP (ref 0–0.5)
EOSINOPHIL NFR BLD AUTO: 7.6 % — HIGH (ref 0–6)
HCT VFR BLD CALC: 44.8 % — SIGNIFICANT CHANGE UP (ref 39–50)
HGB BLD-MCNC: 14.9 G/DL — SIGNIFICANT CHANGE UP (ref 13–17)
IMM GRANULOCYTES NFR BLD AUTO: 0.5 % — SIGNIFICANT CHANGE UP (ref 0–1.5)
LYMPHOCYTES # BLD AUTO: 1.85 K/UL — SIGNIFICANT CHANGE UP (ref 1–3.3)
LYMPHOCYTES # BLD AUTO: 30.5 % — SIGNIFICANT CHANGE UP (ref 13–44)
MCHC RBC-ENTMCNC: 31.4 PG — SIGNIFICANT CHANGE UP (ref 27–34)
MCHC RBC-ENTMCNC: 33.3 G/DL — SIGNIFICANT CHANGE UP (ref 32–36)
MCV RBC AUTO: 94.3 FL — SIGNIFICANT CHANGE UP (ref 80–100)
MONOCYTES # BLD AUTO: 0.56 K/UL — SIGNIFICANT CHANGE UP (ref 0–0.9)
MONOCYTES NFR BLD AUTO: 9.2 % — SIGNIFICANT CHANGE UP (ref 2–14)
NEUTROPHILS # BLD AUTO: 3.09 K/UL — SIGNIFICANT CHANGE UP (ref 1.8–7.4)
NEUTROPHILS NFR BLD AUTO: 50.9 % — SIGNIFICANT CHANGE UP (ref 43–77)
NRBC # BLD: 0 /100 WBCS — SIGNIFICANT CHANGE UP (ref 0–0)
PLATELET # BLD AUTO: 231 K/UL — SIGNIFICANT CHANGE UP (ref 150–400)
RBC # BLD: 4.75 M/UL — SIGNIFICANT CHANGE UP (ref 4.2–5.8)
RBC # FLD: 12.7 % — SIGNIFICANT CHANGE UP (ref 10.3–14.5)
WBC # BLD: 6.07 K/UL — SIGNIFICANT CHANGE UP (ref 3.8–10.5)
WBC # FLD AUTO: 6.07 K/UL — SIGNIFICANT CHANGE UP (ref 3.8–10.5)

## 2021-09-17 PROCEDURE — 99214 OFFICE O/P EST MOD 30 MIN: CPT

## 2021-09-17 NOTE — PHYSICAL EXAM
[Thin] : thin [Normal] : affect appropriate [de-identified] : gained 1 lbs  [Fully active, able to carry on all pre-disease performance without restriction] : Status 0 - Fully active, able to carry on all pre-disease performance without restriction

## 2021-09-17 NOTE — HISTORY OF PRESENT ILLNESS
[Disease: _____________________] : Disease: [unfilled] [M: ___] : M[unfilled] [AJCC Stage: ____] : AJCC Stage: [unfilled] [de-identified] : 71 yo M with tobacco smoking hx and 911 exposure ( being followed at [facility],  was diagnosed with COPD/emphysema (dx soon after 9/11), had screening CT in 2019 which showed a lung mass. Work up at [facility]- solitary mass which was resected (benign-organizing PNA). He was referred to pulm grant a year ago by [facility] program but he made an appointment this year with Dr. Pritchard. He referred him for screening CT in Feb which was done in August and showed right upper lobe 3.7 x 3.8 cm mass with interval increase in size since January 7, 2019 worrisome for neoplasm possibly a primary lung neoplasm. Multiple bilateral smaller nodular opacities are new since January 7, 2019 may represent metastatic disease. Enlarged anterior mediastinal lymph node worrisome for metastatic disease. PET/CT 8/25/2020 IMPRESSION: Since outside PET/CT 1/30/2019: Considerable interval increased size and FDG avidity of a now spiculated right upper lobe mass with central necrosis suspicious for primary lung malignancy. New FDG avid enlarged mediastinal lymph nodes and bilateral pulmonary nodules probably metastatic disease. Indeterminate FDG avid left intraparotid focus, not well evaluated secondary to misregistration artifact, however appears new since prior PET/CT, possibly metastatic. He underwent CT guided bx by Dr. Barraza at [facility] which was suggestive of malignancy. He subsequently underwent bronch and EBUS/bx of level 4 LN which was c/w adeno ca, lung primary. He is still doing well from a symptomatic standpoint. He has lost some weight which he usually does every Summer. He denies fevers, chills, dyspnea, chest pain/tightness, cough, wheeze. Continues to work on a full-time basis as a . \par 10/27/20: pt has no new complaints. He had Ct scans and MRi of brain interval enlargement of multiple bilateral pulmonary masses and mediastinal adenopathy consistent with progressive disease. CT abdomen and MRI brain revealed no mets. \par \par 11/6/2020: Pt presents prior to starting treatment today to review regimen, address any concerns and provide written consent. He offers no new complaints. \par \par 11/27/2020: Pt returns for follow up. S/p first cycle. Tolerating well . NOT SMOKING!!!!! Doing well. Had one episode of near syncope (vaso-vagal). Pt states he was very dehydrated. No further episodes since. Pt states he "feels great"\par \par 12/18/2020: Pt returns for follow up. Tolerating treatment wo any AE. Only reports mild occasional fatigue. Scheduled for C#3 today\par \par 1/8/2021: Pt returns for follow up and discussion of findings on recent imaging. He is feeling well. Appetite good. Occasional fatigue. States he is losing his hair and is very unhappy about it. \par \par 1/14/21: Patient came to clinic for follow up today, he is s/p C4 Carbo/Alimta, Keytruda on 1/8/21, he reports worsening fatigue, poor appetite but stable weight, also reports  for past 2-3 days and  today, reports burning sensation feeling in chest that radiates to his throat and  nausea. Denies sob and skin rash. He did not take any BP meds today as his BP was in 100s He also experiences constipation. ECG was done in the office and showed some flip T waves in anterior leads. Plan to send him to [facility] to evaluate.\par \par 1/29/21: Pt had a prolonged recovery period after last treatment. Nausea, dysphagia, weight loss, and chest pain. As noted above, pt was hospitalized. No acute etiology was found and he was discharged home., He feels better now. Started eating again. CT scan in the hospital on 1/14 revealed  \par 1. Small right upper lobe pulmonary embolism. No evidence of pulmonary infarct or right heart strain.\par 2. Right upper lobe pulmonary mass and bilateral pulmonary nodules are unchanged from 01/05/2021.\par 3. There is extensive wall thickening in the proximal duodenum and within left upper quadrant small bowel loops, compatible with enteritis. Upper abdominal free fluid seen between the duodenum and pancreas and within the left upper quadrant, inferior to the pancreas, may be related to enteritis. Superimposed pancreatitis should be excluded clinically.\par 4. Free fluid in the right perinephric space, posterior-medial to the right kidney is new from 01/05/2021. Underlying genitourinary infection/pyelonephritis should be excluded clinically.\par \par He has lost a lot of weight since last visit. \par \par 2/19/21: Pt reports feeling much better. No ae from last treatment with monotherapy Keytruda. Pt has been seeing GI and cologard colon cancer assay was reported as positive and patient has been scheduled for colonoscopy on 3/19/21. No new complaints. \par \par 4/2/21: Pt returns for follow up. He is feeling well. States he had colonoscopy and endoscopy last week and "everything was normal." Tolerating treatment well. Offers no complaints. \par \par 4/23/21: Pt returns for follow up and discussion of findings on recent imaging. Offers no complaints. Feeling well. Scheduled for treatment today. \par \par 5/11/21: Pt feeling well, doing good on Keytruda. Weight is stable, has no complaints.\par \par 6/4/21: pt returns for f/u. Feels well. No irAE but pt describes extremely poor appetite. Losing weight. No other complaints\par \par 7/16/21:  Pt here for f/u. He is currently receiving  Keytruda monotherpy today with no irEAs. Pt denies SOB, rash, diarrhea, cough and itching.  He feels well and offers no complaints. Pt weight is stable and he verbalized increase appetite.  \par \par 8/27/21: Pt returns for follow up. He is feeling well but expresses concern about not being able to gain weight. No other concerns /complaints.\par \par 9/17/21:  Patient presented for f/u visit. Patient denies SOB, KENNEDY, diarrhea, rash and other irAEs.  Patient gained 1lb. Patient offers no other complaints and verbalized that he is feeling well.   [de-identified] : adeno ca [de-identified] : PDL1 80%

## 2021-09-17 NOTE — ASSESSMENT
[Palliative] : Goals of care discussed with patient: Palliative [FreeTextEntry1] : 69 yo with newly diagnosed NSCLC stage IV . PDL1 was 80% but tumor NGS could not be performed because of QNS. Blood NGS through [facility] revealed- HRAS and TP53 mutations.\par  \par Pt started with carbo/alimta/pembro as front line option. Completed 4 cycles wo irAEs. \par Now on Keytruda monotherapy as maintenance. He has been tolerating well no irAEs.  Denies diarrhea, rash and SOB.   \par -Patient encouraged to continue drinking protein shake.  Patient had a one pound weight gain. \par -Will continue on Keytruda q3w regimen.\par -Review lab result with patient.\par -CT scans ordered for  October\par OV in 6 weeks after scans\par

## 2021-09-17 NOTE — ASSESSMENT
[Palliative] : Goals of care discussed with patient: Palliative [FreeTextEntry1] : 71 yo with newly diagnosed NSCLC stage IV . PDL1 was 80% but tumor NGS could not be performed because of QNS. Blood NGS through [facility] revealed- HRAS and TP53 mutations.\par  \par Pt started with carbo/alimta/pembro as front line option. Completed 4 cycles wo irAEs. \par Now on Keytruda monotherapy as maintenance. He has been tolerating well no irAEs.  Denies diarrhea, rash and SOB.   \par -Patient encouraged to continue drinking protein shake.  Patient had a one pound weight gain. \par -Will continue on Keytruda q3w regimen.\par -Review lab result with patient.\par -CT scans ordered for  October\par OV in 6 weeks after scans\par

## 2021-09-17 NOTE — REVIEW OF SYSTEMS
[Fatigue] : fatigue [Negative] : Allergic/Immunologic [Recent Change In Weight] : ~T no recent weight change [FreeTextEntry2] : stable weight  [Chest Pain] : no chest pain [SOB on Exertion] : no shortness of breath during exertion

## 2021-09-17 NOTE — HISTORY OF PRESENT ILLNESS
[Disease: _____________________] : Disease: [unfilled] [M: ___] : M[unfilled] [AJCC Stage: ____] : AJCC Stage: [unfilled] [de-identified] : 71 yo M with tobacco smoking hx and 911 exposure ( being followed at [facility],  was diagnosed with COPD/emphysema (dx soon after 9/11), had screening CT in 2019 which showed a lung mass. Work up at [facility]- solitary mass which was resected (benign-organizing PNA). He was referred to pulm grant a year ago by [facility] program but he made an appointment this year with Dr. Pritchard. He referred him for screening CT in Feb which was done in August and showed right upper lobe 3.7 x 3.8 cm mass with interval increase in size since January 7, 2019 worrisome for neoplasm possibly a primary lung neoplasm. Multiple bilateral smaller nodular opacities are new since January 7, 2019 may represent metastatic disease. Enlarged anterior mediastinal lymph node worrisome for metastatic disease. PET/CT 8/25/2020 IMPRESSION: Since outside PET/CT 1/30/2019: Considerable interval increased size and FDG avidity of a now spiculated right upper lobe mass with central necrosis suspicious for primary lung malignancy. New FDG avid enlarged mediastinal lymph nodes and bilateral pulmonary nodules probably metastatic disease. Indeterminate FDG avid left intraparotid focus, not well evaluated secondary to misregistration artifact, however appears new since prior PET/CT, possibly metastatic. He underwent CT guided bx by Dr. Barraza at [facility] which was suggestive of malignancy. He subsequently underwent bronch and EBUS/bx of level 4 LN which was c/w adeno ca, lung primary. He is still doing well from a symptomatic standpoint. He has lost some weight which he usually does every Summer. He denies fevers, chills, dyspnea, chest pain/tightness, cough, wheeze. Continues to work on a full-time basis as a . \par 10/27/20: pt has no new complaints. He had Ct scans and MRi of brain interval enlargement of multiple bilateral pulmonary masses and mediastinal adenopathy consistent with progressive disease. CT abdomen and MRI brain revealed no mets. \par \par 11/6/2020: Pt presents prior to starting treatment today to review regimen, address any concerns and provide written consent. He offers no new complaints. \par \par 11/27/2020: Pt returns for follow up. S/p first cycle. Tolerating well . NOT SMOKING!!!!! Doing well. Had one episode of near syncope (vaso-vagal). Pt states he was very dehydrated. No further episodes since. Pt states he "feels great"\par \par 12/18/2020: Pt returns for follow up. Tolerating treatment wo any AE. Only reports mild occasional fatigue. Scheduled for C#3 today\par \par 1/8/2021: Pt returns for follow up and discussion of findings on recent imaging. He is feeling well. Appetite good. Occasional fatigue. States he is losing his hair and is very unhappy about it. \par \par 1/14/21: Patient came to clinic for follow up today, he is s/p C4 Carbo/Alimta, Keytruda on 1/8/21, he reports worsening fatigue, poor appetite but stable weight, also reports  for past 2-3 days and  today, reports burning sensation feeling in chest that radiates to his throat and  nausea. Denies sob and skin rash. He did not take any BP meds today as his BP was in 100s He also experiences constipation. ECG was done in the office and showed some flip T waves in anterior leads. Plan to send him to [facility] to evaluate.\par \par 1/29/21: Pt had a prolonged recovery period after last treatment. Nausea, dysphagia, weight loss, and chest pain. As noted above, pt was hospitalized. No acute etiology was found and he was discharged home., He feels better now. Started eating again. CT scan in the hospital on 1/14 revealed  \par 1. Small right upper lobe pulmonary embolism. No evidence of pulmonary infarct or right heart strain.\par 2. Right upper lobe pulmonary mass and bilateral pulmonary nodules are unchanged from 01/05/2021.\par 3. There is extensive wall thickening in the proximal duodenum and within left upper quadrant small bowel loops, compatible with enteritis. Upper abdominal free fluid seen between the duodenum and pancreas and within the left upper quadrant, inferior to the pancreas, may be related to enteritis. Superimposed pancreatitis should be excluded clinically.\par 4. Free fluid in the right perinephric space, posterior-medial to the right kidney is new from 01/05/2021. Underlying genitourinary infection/pyelonephritis should be excluded clinically.\par \par He has lost a lot of weight since last visit. \par \par 2/19/21: Pt reports feeling much better. No ae from last treatment with monotherapy Keytruda. Pt has been seeing GI and cologard colon cancer assay was reported as positive and patient has been scheduled for colonoscopy on 3/19/21. No new complaints. \par \par 4/2/21: Pt returns for follow up. He is feeling well. States he had colonoscopy and endoscopy last week and "everything was normal." Tolerating treatment well. Offers no complaints. \par \par 4/23/21: Pt returns for follow up and discussion of findings on recent imaging. Offers no complaints. Feeling well. Scheduled for treatment today. \par \par 5/11/21: Pt feeling well, doing good on Keytruda. Weight is stable, has no complaints.\par \par 6/4/21: pt returns for f/u. Feels well. No irAE but pt describes extremely poor appetite. Losing weight. No other complaints\par \par 7/16/21:  Pt here for f/u. He is currently receiving  Keytruda monotherpy today with no irEAs. Pt denies SOB, rash, diarrhea, cough and itching.  He feels well and offers no complaints. Pt weight is stable and he verbalized increase appetite.  \par \par 8/27/21: Pt returns for follow up. He is feeling well but expresses concern about not being able to gain weight. No other concerns /complaints.\par \par 9/17/21:  Patient presented for f/u visit. Patient denies SOB, KENNEDY, diarrhea, rash and other irAEs.  Patient gained 1lb. Patient offers no other complaints and verbalized that he is feeling well.   [de-identified] : adeno ca [de-identified] : PDL1 80%

## 2021-09-17 NOTE — PHYSICAL EXAM
[Thin] : thin [Normal] : affect appropriate [de-identified] : gained 1 lbs  [Fully active, able to carry on all pre-disease performance without restriction] : Status 0 - Fully active, able to carry on all pre-disease performance without restriction

## 2021-09-19 DIAGNOSIS — Z51.11 ENCOUNTER FOR ANTINEOPLASTIC CHEMOTHERAPY: ICD-10-CM

## 2021-09-19 DIAGNOSIS — R11.2 NAUSEA WITH VOMITING, UNSPECIFIED: ICD-10-CM

## 2021-09-27 ENCOUNTER — APPOINTMENT (OUTPATIENT)
Dept: CT IMAGING | Facility: CLINIC | Age: 71
End: 2021-09-27
Payer: COMMERCIAL

## 2021-09-27 ENCOUNTER — OUTPATIENT (OUTPATIENT)
Dept: OUTPATIENT SERVICES | Facility: HOSPITAL | Age: 71
LOS: 1 days | End: 2021-09-27
Payer: COMMERCIAL

## 2021-09-27 DIAGNOSIS — C34.11 MALIGNANT NEOPLASM OF UPPER LOBE, RIGHT BRONCHUS OR LUNG: ICD-10-CM

## 2021-09-27 DIAGNOSIS — Z98.890 OTHER SPECIFIED POSTPROCEDURAL STATES: Chronic | ICD-10-CM

## 2021-09-27 PROCEDURE — 74160 CT ABDOMEN W/CONTRAST: CPT

## 2021-09-27 PROCEDURE — 71260 CT THORAX DX C+: CPT

## 2021-09-27 PROCEDURE — 74160 CT ABDOMEN W/CONTRAST: CPT | Mod: 26

## 2021-09-27 PROCEDURE — 71260 CT THORAX DX C+: CPT | Mod: 26

## 2021-10-08 ENCOUNTER — APPOINTMENT (OUTPATIENT)
Dept: INFUSION THERAPY | Facility: HOSPITAL | Age: 71
End: 2021-10-08

## 2021-10-08 ENCOUNTER — LABORATORY RESULT (OUTPATIENT)
Age: 71
End: 2021-10-08

## 2021-10-08 ENCOUNTER — RESULT REVIEW (OUTPATIENT)
Age: 71
End: 2021-10-08

## 2021-10-08 ENCOUNTER — APPOINTMENT (OUTPATIENT)
Dept: HEMATOLOGY ONCOLOGY | Facility: CLINIC | Age: 71
End: 2021-10-08
Payer: COMMERCIAL

## 2021-10-08 VITALS — BODY MASS INDEX: 18.54 KG/M2 | WEIGHT: 129.19 LBS

## 2021-10-08 LAB
BASOPHILS # BLD AUTO: 0.08 K/UL — SIGNIFICANT CHANGE UP (ref 0–0.2)
BASOPHILS NFR BLD AUTO: 1.2 % — SIGNIFICANT CHANGE UP (ref 0–2)
BUN SERPL-MCNC: 14 MG/DL — SIGNIFICANT CHANGE UP (ref 7–23)
CA-I BLDA-SCNC: 1.22 MMOL/L — SIGNIFICANT CHANGE UP (ref 1.12–1.3)
CHLORIDE SERPL-SCNC: 105 MMOL/L — SIGNIFICANT CHANGE UP (ref 96–108)
CO2 SERPL-SCNC: 22 MMOL/L — SIGNIFICANT CHANGE UP (ref 22–31)
CREAT SERPL-MCNC: 1 MG/DL — SIGNIFICANT CHANGE UP (ref 0.5–1.3)
EOSINOPHIL # BLD AUTO: 0.89 K/UL — HIGH (ref 0–0.5)
EOSINOPHIL NFR BLD AUTO: 13.1 % — HIGH (ref 0–6)
GLUCOSE SERPL-MCNC: 90 MG/DL — SIGNIFICANT CHANGE UP (ref 70–99)
HCT VFR BLD CALC: 40.5 % — SIGNIFICANT CHANGE UP (ref 39–50)
HGB BLD-MCNC: 13.3 G/DL — SIGNIFICANT CHANGE UP (ref 13–17)
IMM GRANULOCYTES NFR BLD AUTO: 0.3 % — SIGNIFICANT CHANGE UP (ref 0–1.5)
LYMPHOCYTES # BLD AUTO: 1.58 K/UL — SIGNIFICANT CHANGE UP (ref 1–3.3)
LYMPHOCYTES # BLD AUTO: 23.3 % — SIGNIFICANT CHANGE UP (ref 13–44)
MCHC RBC-ENTMCNC: 31.4 PG — SIGNIFICANT CHANGE UP (ref 27–34)
MCHC RBC-ENTMCNC: 32.8 G/DL — SIGNIFICANT CHANGE UP (ref 32–36)
MCV RBC AUTO: 95.7 FL — SIGNIFICANT CHANGE UP (ref 80–100)
MONOCYTES # BLD AUTO: 0.61 K/UL — SIGNIFICANT CHANGE UP (ref 0–0.9)
MONOCYTES NFR BLD AUTO: 9 % — SIGNIFICANT CHANGE UP (ref 2–14)
NEUTROPHILS # BLD AUTO: 3.6 K/UL — SIGNIFICANT CHANGE UP (ref 1.8–7.4)
NEUTROPHILS NFR BLD AUTO: 53.1 % — SIGNIFICANT CHANGE UP (ref 43–77)
NRBC # BLD: 0 /100 WBCS — SIGNIFICANT CHANGE UP (ref 0–0)
PLATELET # BLD AUTO: 270 K/UL — SIGNIFICANT CHANGE UP (ref 150–400)
POTASSIUM SERPL-MCNC: 4.5 MMOL/L — SIGNIFICANT CHANGE UP (ref 3.5–5.3)
POTASSIUM SERPL-SCNC: 4.5 MMOL/L — SIGNIFICANT CHANGE UP (ref 3.5–5.3)
RBC # BLD: 4.23 M/UL — SIGNIFICANT CHANGE UP (ref 4.2–5.8)
RBC # FLD: 12.7 % — SIGNIFICANT CHANGE UP (ref 10.3–14.5)
SODIUM SERPL-SCNC: 141 MMOL/L — SIGNIFICANT CHANGE UP (ref 135–145)
WBC # BLD: 6.78 K/UL — SIGNIFICANT CHANGE UP (ref 3.8–10.5)
WBC # FLD AUTO: 6.78 K/UL — SIGNIFICANT CHANGE UP (ref 3.8–10.5)

## 2021-10-08 PROCEDURE — 99214 OFFICE O/P EST MOD 30 MIN: CPT

## 2021-10-08 NOTE — ASSESSMENT
[FreeTextEntry1] : 72 yo with newly diagnosed NSCLC stage IV . PDL1 was 80% but tumor NGS could not be performed because of QNS. Blood NGS through [facility] revealed- HRAS and TP53 mutations.\par  \par Pt started with carbo/alimta/pembro as front line option. Completed 4 cycles wo irAEs. \par Now on Keytruda monotherapy as maintenance. He has been tolerating well no irAEs.  Denies diarrhea, rash and SOB.   \par -Will continue on Keytruda q3w regimen.\par -Reviewed labs from last visit-hemolyzed specimen, will send iSTAT\par -Ct scans reviewed with pt- sustained response, No evidence of POD. Continue monitoring, Will get PET/T next time to see if any role for localized tx, \par OV in 6 weeks. \par  [Palliative] : Goals of care discussed with patient: Palliative

## 2021-10-08 NOTE — HISTORY OF PRESENT ILLNESS
[Disease: _____________________] : Disease: [unfilled] [M: ___] : M[unfilled] [AJCC Stage: ____] : AJCC Stage: [unfilled] [de-identified] : 72 yo M with tobacco smoking hx and 911 exposure ( being followed at [facility],  was diagnosed with COPD/emphysema (dx soon after 9/11), had screening CT in 2019 which showed a lung mass. Work up at [facility]- solitary mass which was resected (benign-organizing PNA). He was referred to pulm grant a year ago by [facility] program but he made an appointment this year with Dr. Pritchard. He referred him for screening CT in Feb which was done in August and showed right upper lobe 3.7 x 3.8 cm mass with interval increase in size since January 7, 2019 worrisome for neoplasm possibly a primary lung neoplasm. Multiple bilateral smaller nodular opacities are new since January 7, 2019 may represent metastatic disease. Enlarged anterior mediastinal lymph node worrisome for metastatic disease. PET/CT 8/25/2020 IMPRESSION: Since outside PET/CT 1/30/2019: Considerable interval increased size and FDG avidity of a now spiculated right upper lobe mass with central necrosis suspicious for primary lung malignancy. New FDG avid enlarged mediastinal lymph nodes and bilateral pulmonary nodules probably metastatic disease. Indeterminate FDG avid left intraparotid focus, not well evaluated secondary to misregistration artifact, however appears new since prior PET/CT, possibly metastatic. He underwent CT guided bx by Dr. Barraza at [facility] which was suggestive of malignancy. He subsequently underwent bronch and EBUS/bx of level 4 LN which was c/w adeno ca, lung primary. He is still doing well from a symptomatic standpoint. He has lost some weight which he usually does every Summer. He denies fevers, chills, dyspnea, chest pain/tightness, cough, wheeze. Continues to work on a full-time basis as a . \par 10/27/20: pt has no new complaints. He had Ct scans and MRi of brain interval enlargement of multiple bilateral pulmonary masses and mediastinal adenopathy consistent with progressive disease. CT abdomen and MRI brain revealed no mets. \par \par 11/6/2020: Pt presents prior to starting treatment today to review regimen, address any concerns and provide written consent. He offers no new complaints. \par \par 11/27/2020: Pt returns for follow up. S/p first cycle. Tolerating well . NOT SMOKING!!!!! Doing well. Had one episode of near syncope (vaso-vagal). Pt states he was very dehydrated. No further episodes since. Pt states he "feels great"\par \par 12/18/2020: Pt returns for follow up. Tolerating treatment wo any AE. Only reports mild occasional fatigue. Scheduled for C#3 today\par \par 1/8/2021: Pt returns for follow up and discussion of findings on recent imaging. He is feeling well. Appetite good. Occasional fatigue. States he is losing his hair and is very unhappy about it. \par \par 1/14/21: Patient came to clinic for follow up today, he is s/p C4 Carbo/Alimta, Keytruda on 1/8/21, he reports worsening fatigue, poor appetite but stable weight, also reports  for past 2-3 days and  today, reports burning sensation feeling in chest that radiates to his throat and  nausea. Denies sob and skin rash. He did not take any BP meds today as his BP was in 100s He also experiences constipation. ECG was done in the office and showed some flip T waves in anterior leads. Plan to send him to [facility] to evaluate.\par \par 1/29/21: Pt had a prolonged recovery period after last treatment. Nausea, dysphagia, weight loss, and chest pain. As noted above, pt was hospitalized. No acute etiology was found and he was discharged home., He feels better now. Started eating again. CT scan in the hospital on 1/14 revealed  \par 1. Small right upper lobe pulmonary embolism. No evidence of pulmonary infarct or right heart strain.\par 2. Right upper lobe pulmonary mass and bilateral pulmonary nodules are unchanged from 01/05/2021.\par 3. There is extensive wall thickening in the proximal duodenum and within left upper quadrant small bowel loops, compatible with enteritis. Upper abdominal free fluid seen between the duodenum and pancreas and within the left upper quadrant, inferior to the pancreas, may be related to enteritis. Superimposed pancreatitis should be excluded clinically.\par 4. Free fluid in the right perinephric space, posterior-medial to the right kidney is new from 01/05/2021. Underlying genitourinary infection/pyelonephritis should be excluded clinically.\par \par He has lost a lot of weight since last visit. \par \par 2/19/21: Pt reports feeling much better. No ae from last treatment with monotherapy Keytruda. Pt has been seeing GI and cologard colon cancer assay was reported as positive and patient has been scheduled for colonoscopy on 3/19/21. No new complaints. \par \par 4/2/21: Pt returns for follow up. He is feeling well. States he had colonoscopy and endoscopy last week and "everything was normal." Tolerating treatment well. Offers no complaints. \par \par 4/23/21: Pt returns for follow up and discussion of findings on recent imaging. Offers no complaints. Feeling well. Scheduled for treatment today. \par \par 5/11/21: Pt feeling well, doing good on Keytruda. Weight is stable, has no complaints.\par \par 6/4/21: pt returns for f/u. Feels well. No irAE but pt describes extremely poor appetite. Losing weight. No other complaints\par \par 7/16/21:  Pt here for f/u. He is currently receiving  Keytruda monotherpy today with no irEAs. Pt denies SOB, rash, diarrhea, cough and itching.  He feels well and offers no complaints. Pt weight is stable and he verbalized increase appetite.  \par \par 8/27/21: Pt returns for follow up. He is feeling well but expresses concern about not being able to gain weight. No other concerns /complaints.\par \par 9/17/21:  Patient presented for f/u visit. Patient denies SOB, KENNEDY, diarrhea, rash and other irAEs.  Patient gained 1lb. Patient offers no other complaints and verbalized that he is feeling well.  \par \par 10/8/21: No complaints.  [de-identified] : adeno ca [de-identified] : PDL1 80%

## 2021-10-11 ENCOUNTER — NON-APPOINTMENT (OUTPATIENT)
Age: 71
End: 2021-10-11

## 2021-10-17 ENCOUNTER — RX RENEWAL (OUTPATIENT)
Age: 71
End: 2021-10-17

## 2021-10-27 ENCOUNTER — OUTPATIENT (OUTPATIENT)
Dept: OUTPATIENT SERVICES | Facility: HOSPITAL | Age: 71
LOS: 1 days | Discharge: HOME | End: 2021-10-27

## 2021-10-27 DIAGNOSIS — C34.11 MALIGNANT NEOPLASM OF UPPER LOBE, RIGHT BRONCHUS OR LUNG: ICD-10-CM

## 2021-10-27 DIAGNOSIS — Z98.890 OTHER SPECIFIED POSTPROCEDURAL STATES: Chronic | ICD-10-CM

## 2021-10-29 ENCOUNTER — LABORATORY RESULT (OUTPATIENT)
Age: 71
End: 2021-10-29

## 2021-10-29 ENCOUNTER — APPOINTMENT (OUTPATIENT)
Dept: HEMATOLOGY ONCOLOGY | Facility: CLINIC | Age: 71
End: 2021-10-29
Payer: COMMERCIAL

## 2021-10-29 ENCOUNTER — APPOINTMENT (OUTPATIENT)
Dept: INFUSION THERAPY | Facility: HOSPITAL | Age: 71
End: 2021-10-29

## 2021-10-29 VITALS
DIASTOLIC BLOOD PRESSURE: 80 MMHG | TEMPERATURE: 97.16 F | RESPIRATION RATE: 20 BRPM | SYSTOLIC BLOOD PRESSURE: 129 MMHG | HEART RATE: 85 BPM | WEIGHT: 125.66 LBS | BODY MASS INDEX: 18.03 KG/M2

## 2021-10-29 PROCEDURE — 99214 OFFICE O/P EST MOD 30 MIN: CPT

## 2021-11-01 ENCOUNTER — NON-APPOINTMENT (OUTPATIENT)
Age: 71
End: 2021-11-01

## 2021-11-01 DIAGNOSIS — Z51.11 ENCOUNTER FOR ANTINEOPLASTIC CHEMOTHERAPY: ICD-10-CM

## 2021-11-01 DIAGNOSIS — R11.2 NAUSEA WITH VOMITING, UNSPECIFIED: ICD-10-CM

## 2021-11-10 ENCOUNTER — FORM ENCOUNTER (OUTPATIENT)
Age: 71
End: 2021-11-10

## 2021-11-10 ENCOUNTER — APPOINTMENT (OUTPATIENT)
Dept: GASTROENTEROLOGY | Facility: CLINIC | Age: 71
End: 2021-11-10
Payer: COMMERCIAL

## 2021-11-10 VITALS
BODY MASS INDEX: 17.36 KG/M2 | TEMPERATURE: 207.86 F | OXYGEN SATURATION: 96 % | SYSTOLIC BLOOD PRESSURE: 121 MMHG | WEIGHT: 121 LBS | DIASTOLIC BLOOD PRESSURE: 79 MMHG | HEART RATE: 88 BPM

## 2021-11-10 DIAGNOSIS — R06.00 DYSPNEA, UNSPECIFIED: ICD-10-CM

## 2021-11-10 DIAGNOSIS — R06.6 HICCOUGH: ICD-10-CM

## 2021-11-10 DIAGNOSIS — R13.10 DYSPHAGIA, UNSPECIFIED: ICD-10-CM

## 2021-11-10 PROCEDURE — 99214 OFFICE O/P EST MOD 30 MIN: CPT

## 2021-11-10 NOTE — PHYSICAL EXAM
[General Appearance - Alert] : alert [General Appearance - In No Acute Distress] : in no acute distress [Sclera] : the sclera and conjunctiva were normal [PERRL With Normal Accommodation] : pupils were equal in size, round, and reactive to light [Extraocular Movements] : extraocular movements were intact [Outer Ear] : the ears and nose were normal in appearance [Oropharynx] : the oropharynx was normal [Neck Appearance] : the appearance of the neck was normal [Neck Cervical Mass (___cm)] : no neck mass was observed [Jugular Venous Distention Increased] : there was no jugular-venous distention [Thyroid Diffuse Enlargement] : the thyroid was not enlarged [Thyroid Nodule] : there were no palpable thyroid nodules [Auscultation Breath Sounds / Voice Sounds] : lungs were clear to auscultation bilaterally [Heart Rate And Rhythm] : heart rate was normal and rhythm regular [Heart Sounds] : normal S1 and S2 [Heart Sounds Gallop] : no gallops [Murmurs] : no murmurs [Heart Sounds Pericardial Friction Rub] : no pericardial rub [Epigastric] : in the epigastric area [Abnormal Walk] : normal gait [Nail Clubbing] : no clubbing  or cyanosis of the fingernails [Musculoskeletal - Swelling] : no joint swelling seen [Motor Tone] : muscle strength and tone were normal [Skin Color & Pigmentation] : normal skin color and pigmentation [Skin Turgor] : normal skin turgor [] : no rash [Deep Tendon Reflexes (DTR)] : deep tendon reflexes were 2+ and symmetric [Sensation] : the sensory exam was normal to light touch and pinprick [No Focal Deficits] : no focal deficits [Oriented To Time, Place, And Person] : oriented to person, place, and time [Impaired Insight] : insight and judgment were intact [Affect] : the affect was normal

## 2021-11-10 NOTE — ASSESSMENT
[FreeTextEntry1] : A low acid / reflux diet was discussed in great detail including  not smoking, not drinking alcohol, and not consuming foods that irritate the esophagus. It is helpful to eat small meals throughout the day instead of large meals. You should avoid eating before bedtime or lying down after you eat. It can be helpful to raise the head of your bed six inches. Additionally, you should maintain a healthy weight and good posture.. The patient was given written material to take home and review.\par \par OV 2 mionths

## 2021-11-19 ENCOUNTER — LABORATORY RESULT (OUTPATIENT)
Age: 71
End: 2021-11-19

## 2021-11-19 ENCOUNTER — RESULT REVIEW (OUTPATIENT)
Age: 71
End: 2021-11-19

## 2021-11-19 ENCOUNTER — APPOINTMENT (OUTPATIENT)
Dept: INFUSION THERAPY | Facility: HOSPITAL | Age: 71
End: 2021-11-19

## 2021-11-19 LAB
BASOPHILS # BLD AUTO: 0.08 K/UL — SIGNIFICANT CHANGE UP (ref 0–0.2)
BASOPHILS NFR BLD AUTO: 1.2 % — SIGNIFICANT CHANGE UP (ref 0–2)
EOSINOPHIL # BLD AUTO: 0.73 K/UL — HIGH (ref 0–0.5)
EOSINOPHIL NFR BLD AUTO: 10.8 % — HIGH (ref 0–6)
HCT VFR BLD CALC: 43 % — SIGNIFICANT CHANGE UP (ref 39–50)
HGB BLD-MCNC: 13.8 G/DL — SIGNIFICANT CHANGE UP (ref 13–17)
IMM GRANULOCYTES NFR BLD AUTO: 0.3 % — SIGNIFICANT CHANGE UP (ref 0–1.5)
LYMPHOCYTES # BLD AUTO: 1.62 K/UL — SIGNIFICANT CHANGE UP (ref 1–3.3)
LYMPHOCYTES # BLD AUTO: 23.9 % — SIGNIFICANT CHANGE UP (ref 13–44)
MCHC RBC-ENTMCNC: 30.8 PG — SIGNIFICANT CHANGE UP (ref 27–34)
MCHC RBC-ENTMCNC: 32.1 G/DL — SIGNIFICANT CHANGE UP (ref 32–36)
MCV RBC AUTO: 96 FL — SIGNIFICANT CHANGE UP (ref 80–100)
MONOCYTES # BLD AUTO: 0.61 K/UL — SIGNIFICANT CHANGE UP (ref 0–0.9)
MONOCYTES NFR BLD AUTO: 9 % — SIGNIFICANT CHANGE UP (ref 2–14)
NEUTROPHILS # BLD AUTO: 3.71 K/UL — SIGNIFICANT CHANGE UP (ref 1.8–7.4)
NEUTROPHILS NFR BLD AUTO: 54.8 % — SIGNIFICANT CHANGE UP (ref 43–77)
NRBC # BLD: 0 /100 WBCS — SIGNIFICANT CHANGE UP (ref 0–0)
PLATELET # BLD AUTO: 247 K/UL — SIGNIFICANT CHANGE UP (ref 150–400)
RBC # BLD: 4.48 M/UL — SIGNIFICANT CHANGE UP (ref 4.2–5.8)
RBC # FLD: 12.7 % — SIGNIFICANT CHANGE UP (ref 10.3–14.5)
WBC # BLD: 6.77 K/UL — SIGNIFICANT CHANGE UP (ref 3.8–10.5)
WBC # FLD AUTO: 6.77 K/UL — SIGNIFICANT CHANGE UP (ref 3.8–10.5)

## 2021-11-22 ENCOUNTER — NON-APPOINTMENT (OUTPATIENT)
Age: 71
End: 2021-11-22

## 2021-12-11 ENCOUNTER — OUTPATIENT (OUTPATIENT)
Dept: OUTPATIENT SERVICES | Facility: HOSPITAL | Age: 71
LOS: 1 days | Discharge: HOME | End: 2021-12-11

## 2021-12-11 DIAGNOSIS — C34.11 MALIGNANT NEOPLASM OF UPPER LOBE, RIGHT BRONCHUS OR LUNG: ICD-10-CM

## 2021-12-11 DIAGNOSIS — Z98.890 OTHER SPECIFIED POSTPROCEDURAL STATES: Chronic | ICD-10-CM

## 2021-12-13 ENCOUNTER — APPOINTMENT (OUTPATIENT)
Dept: NUCLEAR MEDICINE | Facility: IMAGING CENTER | Age: 71
End: 2021-12-13
Payer: COMMERCIAL

## 2021-12-13 ENCOUNTER — OUTPATIENT (OUTPATIENT)
Dept: OUTPATIENT SERVICES | Facility: HOSPITAL | Age: 71
LOS: 1 days | End: 2021-12-13
Payer: COMMERCIAL

## 2021-12-13 DIAGNOSIS — C34.11 MALIGNANT NEOPLASM OF UPPER LOBE, RIGHT BRONCHUS OR LUNG: ICD-10-CM

## 2021-12-13 DIAGNOSIS — Z98.890 OTHER SPECIFIED POSTPROCEDURAL STATES: Chronic | ICD-10-CM

## 2021-12-13 PROCEDURE — 78815 PET IMAGE W/CT SKULL-THIGH: CPT | Mod: 26,PS

## 2021-12-13 PROCEDURE — A9552: CPT

## 2021-12-13 PROCEDURE — 78815 PET IMAGE W/CT SKULL-THIGH: CPT

## 2021-12-15 ENCOUNTER — APPOINTMENT (OUTPATIENT)
Dept: HEMATOLOGY ONCOLOGY | Facility: CLINIC | Age: 71
End: 2021-12-15
Payer: COMMERCIAL

## 2021-12-15 VITALS
OXYGEN SATURATION: 98 % | SYSTOLIC BLOOD PRESSURE: 133 MMHG | WEIGHT: 123.68 LBS | RESPIRATION RATE: 16 BRPM | HEIGHT: 70 IN | DIASTOLIC BLOOD PRESSURE: 85 MMHG | HEART RATE: 84 BPM | TEMPERATURE: 207.86 F | BODY MASS INDEX: 17.71 KG/M2

## 2021-12-15 PROCEDURE — 99214 OFFICE O/P EST MOD 30 MIN: CPT

## 2021-12-17 ENCOUNTER — APPOINTMENT (OUTPATIENT)
Dept: INFUSION THERAPY | Facility: HOSPITAL | Age: 71
End: 2021-12-17

## 2021-12-17 ENCOUNTER — RESULT REVIEW (OUTPATIENT)
Age: 71
End: 2021-12-17

## 2021-12-17 LAB
ALBUMIN SERPL ELPH-MCNC: 4.3 G/DL — SIGNIFICANT CHANGE UP (ref 3.3–5)
ALP SERPL-CCNC: 106 U/L — SIGNIFICANT CHANGE UP (ref 40–120)
ALT FLD-CCNC: 21 U/L — SIGNIFICANT CHANGE UP (ref 10–45)
ANION GAP SERPL CALC-SCNC: 14 MMOL/L — SIGNIFICANT CHANGE UP (ref 5–17)
AST SERPL-CCNC: 35 U/L — SIGNIFICANT CHANGE UP (ref 10–40)
BASOPHILS # BLD AUTO: 0.06 K/UL — SIGNIFICANT CHANGE UP (ref 0–0.2)
BASOPHILS NFR BLD AUTO: 0.9 % — SIGNIFICANT CHANGE UP (ref 0–2)
BILIRUB SERPL-MCNC: 0.4 MG/DL — SIGNIFICANT CHANGE UP (ref 0.2–1.2)
BUN SERPL-MCNC: 24 MG/DL — HIGH (ref 7–23)
CALCIUM SERPL-MCNC: 9.3 MG/DL — SIGNIFICANT CHANGE UP (ref 8.4–10.5)
CEA SERPL-MCNC: 1.5 NG/ML — SIGNIFICANT CHANGE UP (ref 0–3.8)
CHLORIDE SERPL-SCNC: 101 MMOL/L — SIGNIFICANT CHANGE UP (ref 96–108)
CO2 SERPL-SCNC: 23 MMOL/L — SIGNIFICANT CHANGE UP (ref 22–31)
CREAT SERPL-MCNC: 1.19 MG/DL — SIGNIFICANT CHANGE UP (ref 0.5–1.3)
EOSINOPHIL # BLD AUTO: 0.53 K/UL — HIGH (ref 0–0.5)
EOSINOPHIL NFR BLD AUTO: 7.9 % — HIGH (ref 0–6)
GLUCOSE SERPL-MCNC: 93 MG/DL — SIGNIFICANT CHANGE UP (ref 70–99)
HCT VFR BLD CALC: 40.7 % — SIGNIFICANT CHANGE UP (ref 39–50)
HGB BLD-MCNC: 13.3 G/DL — SIGNIFICANT CHANGE UP (ref 13–17)
IMM GRANULOCYTES NFR BLD AUTO: 0.3 % — SIGNIFICANT CHANGE UP (ref 0–1.5)
LYMPHOCYTES # BLD AUTO: 1.43 K/UL — SIGNIFICANT CHANGE UP (ref 1–3.3)
LYMPHOCYTES # BLD AUTO: 21.3 % — SIGNIFICANT CHANGE UP (ref 13–44)
MCHC RBC-ENTMCNC: 31.1 PG — SIGNIFICANT CHANGE UP (ref 27–34)
MCHC RBC-ENTMCNC: 32.7 G/DL — SIGNIFICANT CHANGE UP (ref 32–36)
MCV RBC AUTO: 95.3 FL — SIGNIFICANT CHANGE UP (ref 80–100)
MONOCYTES # BLD AUTO: 0.52 K/UL — SIGNIFICANT CHANGE UP (ref 0–0.9)
MONOCYTES NFR BLD AUTO: 7.7 % — SIGNIFICANT CHANGE UP (ref 2–14)
NEUTROPHILS # BLD AUTO: 4.16 K/UL — SIGNIFICANT CHANGE UP (ref 1.8–7.4)
NEUTROPHILS NFR BLD AUTO: 61.9 % — SIGNIFICANT CHANGE UP (ref 43–77)
NRBC # BLD: 0 /100 WBCS — SIGNIFICANT CHANGE UP (ref 0–0)
PLATELET # BLD AUTO: 254 K/UL — SIGNIFICANT CHANGE UP (ref 150–400)
POTASSIUM SERPL-MCNC: 5.1 MMOL/L — SIGNIFICANT CHANGE UP (ref 3.5–5.3)
POTASSIUM SERPL-SCNC: 5.1 MMOL/L — SIGNIFICANT CHANGE UP (ref 3.5–5.3)
PROT SERPL-MCNC: 7.3 G/DL — SIGNIFICANT CHANGE UP (ref 6–8.3)
RBC # BLD: 4.27 M/UL — SIGNIFICANT CHANGE UP (ref 4.2–5.8)
RBC # FLD: 12.9 % — SIGNIFICANT CHANGE UP (ref 10.3–14.5)
SODIUM SERPL-SCNC: 138 MMOL/L — SIGNIFICANT CHANGE UP (ref 135–145)
TSH SERPL-MCNC: 4.26 UIU/ML — HIGH (ref 0.27–4.2)
WBC # BLD: 6.72 K/UL — SIGNIFICANT CHANGE UP (ref 3.8–10.5)
WBC # FLD AUTO: 6.72 K/UL — SIGNIFICANT CHANGE UP (ref 3.8–10.5)

## 2021-12-18 ENCOUNTER — RX RENEWAL (OUTPATIENT)
Age: 71
End: 2021-12-18

## 2021-12-19 DIAGNOSIS — R11.2 NAUSEA WITH VOMITING, UNSPECIFIED: ICD-10-CM

## 2021-12-19 DIAGNOSIS — Z51.11 ENCOUNTER FOR ANTINEOPLASTIC CHEMOTHERAPY: ICD-10-CM

## 2022-01-04 ENCOUNTER — FORM ENCOUNTER (OUTPATIENT)
Age: 72
End: 2022-01-04

## 2022-01-07 ENCOUNTER — RESULT REVIEW (OUTPATIENT)
Age: 72
End: 2022-01-07

## 2022-01-07 ENCOUNTER — APPOINTMENT (OUTPATIENT)
Dept: INFUSION THERAPY | Facility: HOSPITAL | Age: 72
End: 2022-01-07

## 2022-01-07 LAB
ALBUMIN SERPL ELPH-MCNC: 4.1 G/DL — SIGNIFICANT CHANGE UP (ref 3.3–5)
ALP SERPL-CCNC: 106 U/L — SIGNIFICANT CHANGE UP (ref 40–120)
ALT FLD-CCNC: 12 U/L — SIGNIFICANT CHANGE UP (ref 10–45)
ANION GAP SERPL CALC-SCNC: 12 MMOL/L — SIGNIFICANT CHANGE UP (ref 5–17)
AST SERPL-CCNC: 29 U/L — SIGNIFICANT CHANGE UP (ref 10–40)
BASOPHILS # BLD AUTO: 0.07 K/UL — SIGNIFICANT CHANGE UP (ref 0–0.2)
BASOPHILS NFR BLD AUTO: 1.1 % — SIGNIFICANT CHANGE UP (ref 0–2)
BILIRUB SERPL-MCNC: 0.3 MG/DL — SIGNIFICANT CHANGE UP (ref 0.2–1.2)
BUN SERPL-MCNC: 18 MG/DL — SIGNIFICANT CHANGE UP (ref 7–23)
CALCIUM SERPL-MCNC: 9.5 MG/DL — SIGNIFICANT CHANGE UP (ref 8.4–10.5)
CEA SERPL-MCNC: 1.4 NG/ML — SIGNIFICANT CHANGE UP (ref 0–3.8)
CHLORIDE SERPL-SCNC: 103 MMOL/L — SIGNIFICANT CHANGE UP (ref 96–108)
CO2 SERPL-SCNC: 23 MMOL/L — SIGNIFICANT CHANGE UP (ref 22–31)
CREAT SERPL-MCNC: 1.11 MG/DL — SIGNIFICANT CHANGE UP (ref 0.5–1.3)
EOSINOPHIL # BLD AUTO: 0.48 K/UL — SIGNIFICANT CHANGE UP (ref 0–0.5)
EOSINOPHIL NFR BLD AUTO: 7.5 % — HIGH (ref 0–6)
GLUCOSE SERPL-MCNC: 100 MG/DL — HIGH (ref 70–99)
HCT VFR BLD CALC: 41.7 % — SIGNIFICANT CHANGE UP (ref 39–50)
HGB BLD-MCNC: 13.6 G/DL — SIGNIFICANT CHANGE UP (ref 13–17)
IMM GRANULOCYTES NFR BLD AUTO: 0.2 % — SIGNIFICANT CHANGE UP (ref 0–1.5)
LYMPHOCYTES # BLD AUTO: 1.41 K/UL — SIGNIFICANT CHANGE UP (ref 1–3.3)
LYMPHOCYTES # BLD AUTO: 22.1 % — SIGNIFICANT CHANGE UP (ref 13–44)
MCHC RBC-ENTMCNC: 31.1 PG — SIGNIFICANT CHANGE UP (ref 27–34)
MCHC RBC-ENTMCNC: 32.6 G/DL — SIGNIFICANT CHANGE UP (ref 32–36)
MCV RBC AUTO: 95.4 FL — SIGNIFICANT CHANGE UP (ref 80–100)
MONOCYTES # BLD AUTO: 0.57 K/UL — SIGNIFICANT CHANGE UP (ref 0–0.9)
MONOCYTES NFR BLD AUTO: 8.9 % — SIGNIFICANT CHANGE UP (ref 2–14)
NEUTROPHILS # BLD AUTO: 3.84 K/UL — SIGNIFICANT CHANGE UP (ref 1.8–7.4)
NEUTROPHILS NFR BLD AUTO: 60.2 % — SIGNIFICANT CHANGE UP (ref 43–77)
NRBC # BLD: 0 /100 WBCS — SIGNIFICANT CHANGE UP (ref 0–0)
PLATELET # BLD AUTO: 277 K/UL — SIGNIFICANT CHANGE UP (ref 150–400)
POTASSIUM SERPL-MCNC: 6 MMOL/L — HIGH (ref 3.5–5.3)
POTASSIUM SERPL-SCNC: 6 MMOL/L — HIGH (ref 3.5–5.3)
PROT SERPL-MCNC: 7.1 G/DL — SIGNIFICANT CHANGE UP (ref 6–8.3)
RBC # BLD: 4.37 M/UL — SIGNIFICANT CHANGE UP (ref 4.2–5.8)
RBC # FLD: 12.6 % — SIGNIFICANT CHANGE UP (ref 10.3–14.5)
SODIUM SERPL-SCNC: 138 MMOL/L — SIGNIFICANT CHANGE UP (ref 135–145)
TSH SERPL-MCNC: 2.2 UIU/ML — SIGNIFICANT CHANGE UP (ref 0.27–4.2)
WBC # BLD: 6.38 K/UL — SIGNIFICANT CHANGE UP (ref 3.8–10.5)
WBC # FLD AUTO: 6.38 K/UL — SIGNIFICANT CHANGE UP (ref 3.8–10.5)

## 2022-01-10 ENCOUNTER — NON-APPOINTMENT (OUTPATIENT)
Age: 72
End: 2022-01-10

## 2022-01-14 ENCOUNTER — APPOINTMENT (OUTPATIENT)
Dept: CARDIOLOGY | Facility: CLINIC | Age: 72
End: 2022-01-14
Payer: COMMERCIAL

## 2022-01-14 ENCOUNTER — OUTPATIENT (OUTPATIENT)
Dept: OUTPATIENT SERVICES | Facility: HOSPITAL | Age: 72
LOS: 1 days | Discharge: HOME | End: 2022-01-14
Payer: COMMERCIAL

## 2022-01-14 VITALS
SYSTOLIC BLOOD PRESSURE: 121 MMHG | DIASTOLIC BLOOD PRESSURE: 80 MMHG | TEMPERATURE: 208.22 F | RESPIRATION RATE: 16 BRPM | WEIGHT: 125.22 LBS | OXYGEN SATURATION: 99 % | HEART RATE: 78 BPM | BODY MASS INDEX: 17.97 KG/M2

## 2022-01-14 DIAGNOSIS — C34.11 MALIGNANT NEOPLASM OF UPPER LOBE, RIGHT BRONCHUS OR LUNG: ICD-10-CM

## 2022-01-14 DIAGNOSIS — Z98.890 OTHER SPECIFIED POSTPROCEDURAL STATES: Chronic | ICD-10-CM

## 2022-01-14 PROCEDURE — 93000 ELECTROCARDIOGRAM COMPLETE: CPT

## 2022-01-14 PROCEDURE — 99214 OFFICE O/P EST MOD 30 MIN: CPT

## 2022-01-14 PROCEDURE — 93010 ELECTROCARDIOGRAM REPORT: CPT

## 2022-01-14 NOTE — ASSESSMENT
[FreeTextEntry1] : 71 year old man with lung cancer s/p carbo/pemetrexed, on q3 week pembrolizumab since 11/2020, CAD risk factors including smoking, 9-11 exposure, immunotherapy, hypertension with coronary calcifications/athero on CT scan.\par \par I reviewed recent laboratory tests including lipids and BNP with Mr. Lam. Discussed importance of stopping cigarette smoking entirely and avoidance of second hand smoke.\par \par CAD risk factor modification is important given CACs and risk factor profile, particularly given recent data demonstrating atherosclerosis progression following immunotherapy. \par \par Blood pressure is normal today. Continue amlodipine 10 mg daily. No aortic aneurysm on staging CTs. \par \par Will continue apixaban for history of PE and atherosclerosis. OK to hold as need for procedures. Discussed risk/benefit of VTE prevention given active cancer. May consider change to aspirin.\par \par To continue rosuvastatin 5 mg daily for atherosclerosis (SMA, coronary) on CT, LDL is 71.\par \par Mild LV dysfunction by echo (limited windows), elevated pro-BNP, lower on repeat. Discussed importance of ongoing monitoring for cardiotoxicity/late effects of immunotherapy.\par \par \par Follow up with me in 6 months, earlier if any new symptoms.\par \par \par

## 2022-01-14 NOTE — HISTORY OF PRESENT ILLNESS
[FreeTextEntry1] : 71 year old man with WTC exposure (), smoker, hypertension, metastatic lung adenoCA s/p carbo/pemetrexed and pembrolizumab (11/2020-present), pulmonary embolism (Jan 2021), and coronary artery calcifications by CT who is seen for follow up.\par \par Interval History:\par No new cardiac symptoms. Having tooth pulled and would like to know if he may hold apixaban. \par \par \par History:\par At oncology visit 1/14 reported chest discomfort and labile blood pressure with hypertension at home, noted to have new TWI on his ECG and sent to ER for workup. In ER [facility] Annika was 27-25-21 ng/L (negative), BNP 5716 (elevated). ECG RBBB, chronic. CT-Angiogram showed new segmental PE, started on anticoagulation with enoxaparin and transitioned to apixaban.\par \par Also noted on the CT were coronary artery calcification and signs of colitis. He was prescribed a course of cipro/flagyl for this by the ER, which he feels did nothing. He denies any chest pain or new shortness of breath. He denies any dizziness, palpitations, syncope, or near syncope. He had mild esophagitis from PD-1 inhibitor initially, which resolved.\par \par He denies any history of heart disease but has right bundle branch block on the ECG, which is chronic. Work as a  causes him moderate stress, and he smoked until cancer diagnosis.\par \par He tells me he takes amlodipine for HTN, but is no longer taking HCTZ or losartan. No recent stress test.\par \par Compliant with apixaban, notes some ecchymoses.\par \par May 2021:\par \par He feels well and denies any new cardiac symptoms or complaints. He is taking medications as directed without any notable adverse effects. Continues on immunotherapy with PD-1 inhibitor, every 3 weeks, tolerating well. He smoked 1 or 2 cigarettes in the past month and has some second hand smoke exposure.\par \par No new chest pain, palps, dizziness/lightheadedness.\par \par Sept 10 2021\par Feels well. Continues on pembrolizumab plan for total 24 months. Good disease response to date. Still smoking, but less than 5 per week. No  chest pain, no palps, no change in ET. No bleeding. Dyspnea on exertion is stable. \par \par \par

## 2022-01-14 NOTE — PHYSICAL EXAM
[Well Developed] : well developed [Well Nourished] : well nourished [No Acute Distress] : no acute distress [Normal Conjunctiva] : normal conjunctiva [No Xanthelasma] : no xanthelasma [Normal Venous Pressure] : normal venous pressure [No Carotid Bruit] : no carotid bruit [Normal S1, S2] : normal S1, S2 [No Murmur] : no murmur [No Rub] : no rub [No Gallop] : no gallop [Clear Lung Fields] : clear lung fields [Good Air Entry] : good air entry [No Respiratory Distress] : no respiratory distress  [Soft] : abdomen soft [Non Tender] : non-tender [Normal Gait] : normal gait [Gait - Sufficient for Exercise Testing] : gait - sufficient for exercise testing [No Edema] : no edema [No Cyanosis] : no cyanosis [No Rash] : no rash [No Skin Lesions] : no skin lesions [Moves all extremities] : moves all extremities [No Focal Deficits] : no focal deficits [Normal Speech] : normal speech [Alert and Oriented] : alert and oriented [de-identified] : clubbing

## 2022-01-14 NOTE — CARDIOLOGY SUMMARY
[de-identified] : 1/14/2022: sinus with RBBB, unchanged from prior\par 9/10/2021: sinus rhythm 73 bpm, RBBB. Unchanged from prior\par 5/10/2021: sinus rhythm at 86 bpm with RBBB, unchanged from prior [de-identified] : 02/08/2021: technically difficult, MAC, mild MR. Mild LV dysfunction. No pericardial effusion [de-identified] : July : CT chest, abdomen with IV contrast: no aortic aneurysm and moderate proximal SMA stenosis

## 2022-01-14 NOTE — REASON FOR VISIT
[CV Risk Factors and Non-Cardiac Disease] : CV risk factors and non-cardiac disease [Hypertension] : hypertension [Coronary Artery Disease] : coronary artery disease [FreeTextEntry3] : Dr. Bell

## 2022-01-24 ENCOUNTER — FORM ENCOUNTER (OUTPATIENT)
Age: 72
End: 2022-01-24

## 2022-01-28 ENCOUNTER — RESULT REVIEW (OUTPATIENT)
Age: 72
End: 2022-01-28

## 2022-01-28 ENCOUNTER — APPOINTMENT (OUTPATIENT)
Dept: HEMATOLOGY ONCOLOGY | Facility: CLINIC | Age: 72
End: 2022-01-28
Payer: COMMERCIAL

## 2022-01-28 ENCOUNTER — APPOINTMENT (OUTPATIENT)
Dept: INFUSION THERAPY | Facility: HOSPITAL | Age: 72
End: 2022-01-28

## 2022-01-28 DIAGNOSIS — R91.1 SOLITARY PULMONARY NODULE: ICD-10-CM

## 2022-01-28 LAB
ALBUMIN SERPL ELPH-MCNC: 4.2 G/DL — SIGNIFICANT CHANGE UP (ref 3.3–5)
ALP SERPL-CCNC: 100 U/L — SIGNIFICANT CHANGE UP (ref 40–120)
ALT FLD-CCNC: 16 U/L — SIGNIFICANT CHANGE UP (ref 10–45)
ANION GAP SERPL CALC-SCNC: 12 MMOL/L — SIGNIFICANT CHANGE UP (ref 5–17)
AST SERPL-CCNC: 29 U/L — SIGNIFICANT CHANGE UP (ref 10–40)
BASOPHILS # BLD AUTO: 0.08 K/UL — SIGNIFICANT CHANGE UP (ref 0–0.2)
BASOPHILS NFR BLD AUTO: 1.5 % — SIGNIFICANT CHANGE UP (ref 0–2)
BILIRUB SERPL-MCNC: 0.3 MG/DL — SIGNIFICANT CHANGE UP (ref 0.2–1.2)
BUN SERPL-MCNC: 19 MG/DL — SIGNIFICANT CHANGE UP (ref 7–23)
CALCIUM SERPL-MCNC: 9.3 MG/DL — SIGNIFICANT CHANGE UP (ref 8.4–10.5)
CHLORIDE SERPL-SCNC: 103 MMOL/L — SIGNIFICANT CHANGE UP (ref 96–108)
CO2 SERPL-SCNC: 22 MMOL/L — SIGNIFICANT CHANGE UP (ref 22–31)
CREAT SERPL-MCNC: 1.1 MG/DL — SIGNIFICANT CHANGE UP (ref 0.5–1.3)
EOSINOPHIL # BLD AUTO: 0.51 K/UL — HIGH (ref 0–0.5)
EOSINOPHIL NFR BLD AUTO: 9.3 % — HIGH (ref 0–6)
GLUCOSE SERPL-MCNC: 92 MG/DL — SIGNIFICANT CHANGE UP (ref 70–99)
HCT VFR BLD CALC: 42.9 % — SIGNIFICANT CHANGE UP (ref 39–50)
HGB BLD-MCNC: 13.9 G/DL — SIGNIFICANT CHANGE UP (ref 13–17)
IMM GRANULOCYTES NFR BLD AUTO: 0.4 % — SIGNIFICANT CHANGE UP (ref 0–1.5)
LYMPHOCYTES # BLD AUTO: 1.29 K/UL — SIGNIFICANT CHANGE UP (ref 1–3.3)
LYMPHOCYTES # BLD AUTO: 23.5 % — SIGNIFICANT CHANGE UP (ref 13–44)
MCHC RBC-ENTMCNC: 30.7 PG — SIGNIFICANT CHANGE UP (ref 27–34)
MCHC RBC-ENTMCNC: 32.4 G/DL — SIGNIFICANT CHANGE UP (ref 32–36)
MCV RBC AUTO: 94.7 FL — SIGNIFICANT CHANGE UP (ref 80–100)
MONOCYTES # BLD AUTO: 0.5 K/UL — SIGNIFICANT CHANGE UP (ref 0–0.9)
MONOCYTES NFR BLD AUTO: 9.1 % — SIGNIFICANT CHANGE UP (ref 2–14)
NEUTROPHILS # BLD AUTO: 3.1 K/UL — SIGNIFICANT CHANGE UP (ref 1.8–7.4)
NEUTROPHILS NFR BLD AUTO: 56.2 % — SIGNIFICANT CHANGE UP (ref 43–77)
NRBC # BLD: 0 /100 WBCS — SIGNIFICANT CHANGE UP (ref 0–0)
PLATELET # BLD AUTO: 283 K/UL — SIGNIFICANT CHANGE UP (ref 150–400)
POTASSIUM SERPL-MCNC: 4.6 MMOL/L — SIGNIFICANT CHANGE UP (ref 3.5–5.3)
POTASSIUM SERPL-SCNC: 4.6 MMOL/L — SIGNIFICANT CHANGE UP (ref 3.5–5.3)
PROT SERPL-MCNC: 6.7 G/DL — SIGNIFICANT CHANGE UP (ref 6–8.3)
RBC # BLD: 4.53 M/UL — SIGNIFICANT CHANGE UP (ref 4.2–5.8)
RBC # FLD: 12.5 % — SIGNIFICANT CHANGE UP (ref 10.3–14.5)
SODIUM SERPL-SCNC: 137 MMOL/L — SIGNIFICANT CHANGE UP (ref 135–145)
WBC # BLD: 5.5 K/UL — SIGNIFICANT CHANGE UP (ref 3.8–10.5)
WBC # FLD AUTO: 5.5 K/UL — SIGNIFICANT CHANGE UP (ref 3.8–10.5)

## 2022-01-28 PROCEDURE — 99214 OFFICE O/P EST MOD 30 MIN: CPT

## 2022-01-28 NOTE — PHYSICAL EXAM
[Fully active, able to carry on all pre-disease performance without restriction] : Status 0 - Fully active, able to carry on all pre-disease performance without restriction [Thin] : thin [Normal] : affect appropriate [de-identified] : no lower extremity edema noted, strong and regular radial pulses

## 2022-01-28 NOTE — HISTORY OF PRESENT ILLNESS
[Disease: _____________________] : Disease: [unfilled] [M: ___] : M[unfilled] [AJCC Stage: ____] : AJCC Stage: [unfilled] [de-identified] : 70 yo M with tobacco smoking hx and 911 exposure ( [facility],  was diagnosed with COPD/emphysema (dx soon after 9/11), had screening CT in 2019 which showed a lung mass. Work up at [facility]- solitary mass which was resected (benign-organizing PNA). He was referred to pulm grant a year ago by [facility] program but he made an appointment this year with Dr. Pritchard. He referred him for screening CT in Feb which was done in August and showed right upper lobe 3.7 x 3.8 cm mass with interval increase in size since January 7, 2019 worrisome for neoplasm possibly a primary lung neoplasm. Multiple bilateral smaller nodular opacities are new since January 7, 2019 may represent metastatic disease. Enlarged anterior mediastinal lymph node worrisome for metastatic disease. PET/CT 8/25/2020 IMPRESSION: Since outside PET/CT 1/30/2019: Considerable interval increased size and FDG avidity of a now spiculated right upper lobe mass with central necrosis suspicious for primary lung malignancy. New FDG avid enlarged mediastinal lymph nodes and bilateral pulmonary nodules probably metastatic disease. Indeterminate FDG avid left intraparotid focus, not well evaluated secondary to misregistration artifact, however appears new since prior PET/CT, possibly metastatic. He underwent CT guided bx by Dr. Barraza at [facility] which was suggestive of malignancy. He subsequently underwent bronch and EBUS/bx of level 4 LN which was c/w adeno ca, lung primary. He is still doing well from a symptomatic standpoint. He has lost some weight which he usually does every Summer. He denies fevers, chills, dyspnea, chest pain/tightness, cough, wheeze. Continues to work on a full-time basis as a . \par 10/27/20: pt has no new complaints. He had Ct scans and MRi of brain interval enlargement of multiple bilateral pulmonary masses and mediastinal adenopathy consistent with progressive disease. CT abdomen and MRI brain revealed no mets. \par \par 11/6/2020: Pt presents prior to starting treatment today to review regimen, address any concerns and provide written consent. He offers no new complaints. \par \par 11/27/2020: Pt returns for follow up. S/p first cycle. Tolerating well . NOT SMOKING!!!!! Doing well. Had one episode of near syncope (vaso-vagal). Pt states he was very dehydrated. No further episodes since. Pt states he "feels great"\par \par 12/18/2020: Pt returns for follow up. Tolerating treatment wo any AE. Only reports mild occasional fatigue. Scheduled for C#3 today\par \par 1/8/2021: Pt returns for follow up and discussion of findings on recent imaging. He is feeling well. Appetite good. Occasional fatigue. States he is losing his hair and is very unhappy about it. \par \par 1/14/21: Patient came to clinic for follow up today, he is s/p C4 Carbo/Alimta, Keytruda on 1/8/21, he reports worsening fatigue, poor appetite but stable weight, also reports  for past 2-3 days and  today, reports burning sensation feeling in chest that radiates to his throat and  nausea. Denies sob and skin rash. He did not take any BP meds today as his BP was in 100s He also experiences constipation. ECG was done in the office and showed some flip T waves in anterior leads. Plan to send him to [facility] to evaluate.\par \par 1/29/21: Pt had a prolonged recovery period after last treatment. Nausea, dysphagia, weight loss, and chest pain. As noted above, pt was hospitalized. No acute etiology was found and he was discharged home., He feels better now. Started eating again. CT scan in the hospital on 1/14 revealed  \par 1. Small right upper lobe pulmonary embolism. No evidence of pulmonary infarct or right heart strain.\par 2. Right upper lobe pulmonary mass and bilateral pulmonary nodules are unchanged from 01/05/2021.\par 3. There is extensive wall thickening in the proximal duodenum and within left upper quadrant small bowel loops, compatible with enteritis. Upper abdominal free fluid seen between the duodenum and pancreas and within the left upper quadrant, inferior to the pancreas, may be related to enteritis. Superimposed pancreatitis should be excluded clinically.\par 4. Free fluid in the right perinephric space, posterior-medial to the right kidney is new from 01/05/2021. Underlying genitourinary infection/pyelonephritis should be excluded clinically.\par \par He has lost a lot of weight since last visit. \par \par 2/19/21: Pt reports feeling much better. No ae from last treatment with monotherapy Keytruda. Pt has been seeing GI and cologard colon cancer assay was reported as positive and patient has been scheduled for colonoscopy on 3/19/21. No new complaints. \par \par 4/2/21: Pt returns for follow up. He is feeling well. States he had colonoscopy and endoscopy last week and "everything was normal." Tolerating treatment well. Offers no complaints. \par \par 4/23/21: Pt returns for follow up and discussion of findings on recent imaging. Offers no complaints. Feeling well. Scheduled for treatment today. \par \par 5/11/21: Pt feeling well, doing good on Keytruda. Weight is stable, has no complaints.\par \par 6/4/21: pt returns for f/u. Feels well. No irAE but pt describes extremely poor appetite. Losing weight. No other complaints\par \par 7/16/21:  Pt here for f/u. He is currently receiving  Keytruda monotherpy today with no irEAs. Pt denies SOB, rash, diarrhea, cough and itching.  He feels well and offers no complaints. Pt weight is stable and he verbalized increase appetite.  \par \par 8/27/21: Pt returns for follow up. He is feeling well but expresses concern about not being able to gain weight. No other concerns /complaints.\par \par 9/17/21:  Patient presented for f/u visit. Patient denies SOB, KENNEDY, diarrhea, rash and other irAEs.  Patient gained 1lb. Patient offers no other complaints and verbalized that he is feeling well.  \par \par 10/8/21: No complaints. \par \par 12/15/21: No complaints. No irAEs\par \par 1/28/22: Patient seen in treatment room for follow up. Reports doing well without any complaints. Denies worsening SOB, cough CP, abdominal pain, n/v/d, itching. Reports feeling generally well overall.  [de-identified] : adeno ca [de-identified] : PDL1 80%

## 2022-01-28 NOTE — ASSESSMENT
[Palliative] : Goals of care discussed with patient: Palliative [FreeTextEntry1] : 70 yo with newly diagnosed NSCLC stage IV . PDL1 was 80% but tumor NGS showed high TMB. [facility]- HRAS and TP53 mutations.\par  \par Pt started with carbo/alimta/pembro as front line option. Completed 4 cycles wo irAEs. \par Now on Keytruda monotherapy as maintenance. He has been tolerating well no irAEs.  Denies diarrhea, rash and SOB, cough.\par -Will continue on Keytruda q3w regimen\par - PET/CT reviewed in detail- excellent near-CR\par - scans to be ordered in 6 weeks from prior. CT chest and abdomen. \par -OV in 6 weeks after scans\par \par

## 2022-01-28 NOTE — REVIEW OF SYSTEMS
[Fatigue] : fatigue [Recent Change In Weight] : ~T recent weight change [Negative] : Heme/Lymph [Chest Pain] : no chest pain [SOB on Exertion] : no shortness of breath during exertion

## 2022-01-30 DIAGNOSIS — Z51.11 ENCOUNTER FOR ANTINEOPLASTIC CHEMOTHERAPY: ICD-10-CM

## 2022-01-30 DIAGNOSIS — R11.2 NAUSEA WITH VOMITING, UNSPECIFIED: ICD-10-CM

## 2022-01-30 LAB — TSH SERPL-MCNC: 2.11 UIU/ML — SIGNIFICANT CHANGE UP (ref 0.27–4.2)

## 2022-02-01 ENCOUNTER — NON-APPOINTMENT (OUTPATIENT)
Age: 72
End: 2022-02-01

## 2022-02-01 ENCOUNTER — APPOINTMENT (OUTPATIENT)
Dept: PULMONOLOGY | Facility: CLINIC | Age: 72
End: 2022-02-01
Payer: COMMERCIAL

## 2022-02-01 VITALS
WEIGHT: 125 LBS | HEIGHT: 70 IN | SYSTOLIC BLOOD PRESSURE: 125 MMHG | OXYGEN SATURATION: 96 % | DIASTOLIC BLOOD PRESSURE: 81 MMHG | BODY MASS INDEX: 17.9 KG/M2 | TEMPERATURE: 207.5 F | RESPIRATION RATE: 17 BRPM | HEART RATE: 81 BPM

## 2022-02-01 PROCEDURE — 99214 OFFICE O/P EST MOD 30 MIN: CPT

## 2022-02-01 NOTE — HISTORY OF PRESENT ILLNESS
[TextBox_4] : Patient doing well. NOtes some dyspnea, especially with exertion, such as shoveling show in a blizzard. However, overall feels well.  Latest PET scans were significantly improved. Has been somewhat noncompliant with trelegy.

## 2022-02-01 NOTE — ASSESSMENT
[FreeTextEntry1] : 1. COPD: Doing well. Stressed compliance with trelegy. Renewed meds\par \par 2. Lung ca: Doing extremely well on Keytruda.

## 2022-02-05 NOTE — PHYSICAL EXAM
[Fully active, able to carry on all pre-disease performance without restriction] : Status 0 - Fully active, able to carry on all pre-disease performance without restriction [Thin] : thin [Normal] : affect appropriate [de-identified] : no post-nasal drip [de-identified] : long expiratory wheeze

## 2022-02-05 NOTE — ASSESSMENT
[Palliative] : Goals of care discussed with patient: Palliative [FreeTextEntry1] : 72 yo with newly diagnosed NSCLC stage IV . PDL1 was 80% but tumor NGS could not be performed because of QNS. Blood NGS through [facility] revealed- HRAS and TP53 mutations.\par  \par Pt started with carbo/alimta/pembro as front line option. Completed 4 cycles wo irAEs. \par Now on Keytruda monotherapy as maintenance. He has been tolerating well no irAEs.  Denies diarrhea, rash and SOB.   \par -Will continue on Keytruda q3w regimen.\par -Reviewed labs from last visit-hemolyzed specimen, will send iSTAT\par -Ct scans reviewed with pt- sustained response, No evidence of POD. Continue monitoring, Will get PET/T next time to see if any role for localized tx, \par OV in 6 weeks. \par

## 2022-02-05 NOTE — HISTORY OF PRESENT ILLNESS
[Disease: _____________________] : Disease: [unfilled] [M: ___] : M[unfilled] [AJCC Stage: ____] : AJCC Stage: [unfilled] [de-identified] : 70 yo M with tobacco smoking hx and 911 exposure ( being [facility],  was diagnosed with COPD/emphysema (dx soon after 9/11), had screening CT in 2019 which showed a lung mass. Work up at [facility]- solitary mass which was resected (benign-organizing PNA). He was referred to pulm garnt a year ago by [facility] program but he made an appointment this year with Dr. Pritchard. He referred him for screening CT in Feb which was done in August and showed right upper lobe 3.7 x 3.8 cm mass with interval increase in size since January 7, 2019 worrisome for neoplasm possibly a primary lung neoplasm. Multiple bilateral smaller nodular opacities are new since January 7, 2019 may represent metastatic disease. Enlarged anterior mediastinal lymph node worrisome for metastatic disease. PET/CT 8/25/2020 IMPRESSION: Since outside PET/CT 1/30/2019: Considerable interval increased size and FDG avidity of a now spiculated right upper lobe mass with central necrosis suspicious for primary lung malignancy. New FDG avid enlarged mediastinal lymph nodes and bilateral pulmonary nodules probably metastatic disease. Indeterminate FDG avid left intraparotid focus, not well evaluated secondary to misregistration artifact, however appears new since prior PET/CT, possibly metastatic. He underwent CT guided bx by Dr. Barraza at [facility] which was suggestive of malignancy. He subsequently underwent bronch and EBUS/bx of level 4 LN which was c/w adeno ca, lung primary. He is still doing well from a symptomatic standpoint. He has lost some weight which he usually does every Summer. He denies fevers, chills, dyspnea, chest pain/tightness, cough, wheeze. Continues to work on a full-time basis as a . \par 10/27/20: pt has no new complaints. He had Ct scans and MRi of brain interval enlargement of multiple bilateral pulmonary masses and mediastinal adenopathy consistent with progressive disease. CT abdomen and MRI brain revealed no mets. \par \par 11/6/2020: Pt presents prior to starting treatment today to review regimen, address any concerns and provide written consent. He offers no new complaints. \par \par 11/27/2020: Pt returns for follow up. S/p first cycle. Tolerating well . NOT SMOKING!!!!! Doing well. Had one episode of near syncope (vaso-vagal). Pt states he was very dehydrated. No further episodes since. Pt states he "feels great"\par \par 12/18/2020: Pt returns for follow up. Tolerating treatment wo any AE. Only reports mild occasional fatigue. Scheduled for C#3 today\par \par 1/8/2021: Pt returns for follow up and discussion of findings on recent imaging. He is feeling well. Appetite good. Occasional fatigue. States he is losing his hair and is very unhappy about it. \par \par 1/14/21: Patient came to clinic for follow up today, he is s/p C4 Carbo/Alimta, Keytruda on 1/8/21, he reports worsening fatigue, poor appetite but stable weight, also reports  for past 2-3 days and  today, reports burning sensation feeling in chest that radiates to his throat and  nausea. Denies sob and skin rash. He did not take any BP meds today as his BP was in 100s He also experiences constipation. ECG was done in the office and showed some flip T waves in anterior leads. Plan to send him to [facility] to evaluate.\par \par 1/29/21: Pt had a prolonged recovery period after last treatment. Nausea, dysphagia, weight loss, and chest pain. As noted above, pt was hospitalized. No acute etiology was found and he was discharged home., He feels better now. Started eating again. CT scan in the hospital on 1/14 revealed  \par 1. Small right upper lobe pulmonary embolism. No evidence of pulmonary infarct or right heart strain.\par 2. Right upper lobe pulmonary mass and bilateral pulmonary nodules are unchanged from 01/05/2021.\par 3. There is extensive wall thickening in the proximal duodenum and within left upper quadrant small bowel loops, compatible with enteritis. Upper abdominal free fluid seen between the duodenum and pancreas and within the left upper quadrant, inferior to the pancreas, may be related to enteritis. Superimposed pancreatitis should be excluded clinically.\par 4. Free fluid in the right perinephric space, posterior-medial to the right kidney is new from 01/05/2021. Underlying genitourinary infection/pyelonephritis should be excluded clinically.\par \par He has lost a lot of weight since last visit. \par \par 2/19/21: Pt reports feeling much better. No ae from last treatment with monotherapy Keytruda. Pt has been seeing GI and cologard colon cancer assay was reported as positive and patient has been scheduled for colonoscopy on 3/19/21. No new complaints. \par \par 4/2/21: Pt returns for follow up. He is feeling well. States he had colonoscopy and endoscopy last week and "everything was normal." Tolerating treatment well. Offers no complaints. \par \par 4/23/21: Pt returns for follow up and discussion of findings on recent imaging. Offers no complaints. Feeling well. Scheduled for treatment today. \par \par 5/11/21: Pt feeling well, doing good on Keytruda. Weight is stable, has no complaints.\par \par 6/4/21: pt returns for f/u. Feels well. No irAE but pt describes extremely poor appetite. Losing weight. No other complaints\par \par 7/16/21:  Pt here for f/u. He is currently receiving  Keytruda monotherpy today with no irEAs. Pt denies SOB, rash, diarrhea, cough and itching.  He feels well and offers no complaints. Pt weight is stable and he verbalized increase appetite.  \par \par 8/27/21: Pt returns for follow up. He is feeling well but expresses concern about not being able to gain weight. No other concerns /complaints.\par \par 9/17/21:  Patient presented for f/u visit. Patient denies SOB, KENNEDY, diarrhea, rash and other irAEs.  Patient gained 1lb. Patient offers no other complaints and verbalized that he is feeling well.  \par \par 10/8/21: No complaints. \par \par 12/15/21: PET shows positive response to treatment.  Pt feels better, still exposed to second hand smoke from friends daily at the [employer].  No wt loss.  Looks forward to having fam over for the holidays.  [de-identified] : adeno ca [de-identified] : PDL1 80% [de-identified] : PET 12/13/21 showed interval response in pulm nodues and perihilar LN.  Indeterminate left parotid FDG-avid focus.  Still doesn't want flu shot.  Got COVID booster (Angela, 12/1/21).  No recent illness.  Denies chronic cough. Will see Pulm Jan 7.  Stable on Trelegy, maybe once a week or two weeks.  Doses himself before walks.  Just got back from a week in [facility], tested neg for COVID.

## 2022-02-05 NOTE — HISTORY OF PRESENT ILLNESS
[Disease: _____________________] : Disease: [unfilled] [M: ___] : M[unfilled] [AJCC Stage: ____] : AJCC Stage: [unfilled] [de-identified] : 70 yo M with tobacco smoking hx and 911 exposure ( being [facility],  was diagnosed with COPD/emphysema (dx soon after 9/11), had screening CT in 2019 which showed a lung mass. Work up at [facility]- solitary mass which was resected (benign-organizing PNA). He was referred to pulm grant a year ago by [facility] program but he made an appointment this year with Dr. Pritchard. He referred him for screening CT in Feb which was done in August and showed right upper lobe 3.7 x 3.8 cm mass with interval increase in size since January 7, 2019 worrisome for neoplasm possibly a primary lung neoplasm. Multiple bilateral smaller nodular opacities are new since January 7, 2019 may represent metastatic disease. Enlarged anterior mediastinal lymph node worrisome for metastatic disease. PET/CT 8/25/2020 IMPRESSION: Since outside PET/CT 1/30/2019: Considerable interval increased size and FDG avidity of a now spiculated right upper lobe mass with central necrosis suspicious for primary lung malignancy. New FDG avid enlarged mediastinal lymph nodes and bilateral pulmonary nodules probably metastatic disease. Indeterminate FDG avid left intraparotid focus, not well evaluated secondary to misregistration artifact, however appears new since prior PET/CT, possibly metastatic. He underwent CT guided bx by Dr. Barraza at [facility] which was suggestive of malignancy. He subsequently underwent bronch and EBUS/bx of level 4 LN which was c/w adeno ca, lung primary. He is still doing well from a symptomatic standpoint. He has lost some weight which he usually does every Summer. He denies fevers, chills, dyspnea, chest pain/tightness, cough, wheeze. Continues to work on a full-time basis as a . \par 10/27/20: pt has no new complaints. He had Ct scans and MRi of brain interval enlargement of multiple bilateral pulmonary masses and mediastinal adenopathy consistent with progressive disease. CT abdomen and MRI brain revealed no mets. \par \par 11/6/2020: Pt presents prior to starting treatment today to review regimen, address any concerns and provide written consent. He offers no new complaints. \par \par 11/27/2020: Pt returns for follow up. S/p first cycle. Tolerating well . NOT SMOKING!!!!! Doing well. Had one episode of near syncope (vaso-vagal). Pt states he was very dehydrated. No further episodes since. Pt states he "feels great"\par \par 12/18/2020: Pt returns for follow up. Tolerating treatment wo any AE. Only reports mild occasional fatigue. Scheduled for C#3 today\par \par 1/8/2021: Pt returns for follow up and discussion of findings on recent imaging. He is feeling well. Appetite good. Occasional fatigue. States he is losing his hair and is very unhappy about it. \par \par 1/14/21: Patient came to clinic for follow up today, he is s/p C4 Carbo/Alimta, Keytruda on 1/8/21, he reports worsening fatigue, poor appetite but stable weight, also reports  for past 2-3 days and  today, reports burning sensation feeling in chest that radiates to his throat and  nausea. Denies sob and skin rash. He did not take any BP meds today as his BP was in 100s He also experiences constipation. ECG was done in the office and showed some flip T waves in anterior leads. Plan to send him to [facility] to evaluate.\par \par 1/29/21: Pt had a prolonged recovery period after last treatment. Nausea, dysphagia, weight loss, and chest pain. As noted above, pt was hospitalized. No acute etiology was found and he was discharged home., He feels better now. Started eating again. CT scan in the hospital on 1/14 revealed  \par 1. Small right upper lobe pulmonary embolism. No evidence of pulmonary infarct or right heart strain.\par 2. Right upper lobe pulmonary mass and bilateral pulmonary nodules are unchanged from 01/05/2021.\par 3. There is extensive wall thickening in the proximal duodenum and within left upper quadrant small bowel loops, compatible with enteritis. Upper abdominal free fluid seen between the duodenum and pancreas and within the left upper quadrant, inferior to the pancreas, may be related to enteritis. Superimposed pancreatitis should be excluded clinically.\par 4. Free fluid in the right perinephric space, posterior-medial to the right kidney is new from 01/05/2021. Underlying genitourinary infection/pyelonephritis should be excluded clinically.\par \par He has lost a lot of weight since last visit. \par \par 2/19/21: Pt reports feeling much better. No ae from last treatment with monotherapy Keytruda. Pt has been seeing GI and cologard colon cancer assay was reported as positive and patient has been scheduled for colonoscopy on 3/19/21. No new complaints. \par \par 4/2/21: Pt returns for follow up. He is feeling well. States he had colonoscopy and endoscopy last week and "everything was normal." Tolerating treatment well. Offers no complaints. \par \par 4/23/21: Pt returns for follow up and discussion of findings on recent imaging. Offers no complaints. Feeling well. Scheduled for treatment today. \par \par 5/11/21: Pt feeling well, doing good on Keytruda. Weight is stable, has no complaints.\par \par 6/4/21: pt returns for f/u. Feels well. No irAE but pt describes extremely poor appetite. Losing weight. No other complaints\par \par 7/16/21:  Pt here for f/u. He is currently receiving  Keytruda monotherpy today with no irEAs. Pt denies SOB, rash, diarrhea, cough and itching.  He feels well and offers no complaints. Pt weight is stable and he verbalized increase appetite.  \par \par 8/27/21: Pt returns for follow up. He is feeling well but expresses concern about not being able to gain weight. No other concerns /complaints.\par \par 9/17/21:  Patient presented for f/u visit. Patient denies SOB, KENNEDY, diarrhea, rash and other irAEs.  Patient gained 1lb. Patient offers no other complaints and verbalized that he is feeling well.  \par \par 10/8/21: No complaints. \par \par 12/15/21: PET shows positive response to treatment.  Pt feels better, still exposed to second hand smoke from friends daily at the [facility].  No wt loss.  Looks forward to having fam over for the holidays.  [de-identified] : adeno ca [de-identified] : PDL1 80% [de-identified] : PET 12/13/21 showed interval response in pulm nodues and perihilar LN.  Indeterminate left parotid FDG-avid focus.  Still doesn't want flu shot.  Got COVID booster (Angela, 12/1/21).  No recent illness.  Denies chronic cough. Will see Pulm Jan 7.  Stable on Trelegy, maybe once a week or two weeks.  Doses himself before walks.  Just got back from a week in [facility], tested neg for COVID.

## 2022-02-05 NOTE — REVIEW OF SYSTEMS
[Fatigue] : fatigue [Recent Change In Weight] : ~T recent weight change [Negative] : Allergic/Immunologic [Fever] : no fever [Chills] : no chills [Eye Pain] : no eye pain [Dysphagia] : no dysphagia [Loss of Hearing] : no loss of hearing [Hoarseness] : no hoarseness [Chest Pain] : no chest pain [Palpitations] : no palpitations [SOB on Exertion] : no shortness of breath during exertion

## 2022-02-05 NOTE — PHYSICAL EXAM
[Fully active, able to carry on all pre-disease performance without restriction] : Status 0 - Fully active, able to carry on all pre-disease performance without restriction [Thin] : thin [Normal] : affect appropriate [de-identified] : no post-nasal drip [de-identified] : long expiratory wheeze

## 2022-02-05 NOTE — ASSESSMENT
[Palliative] : Goals of care discussed with patient: Palliative [FreeTextEntry1] : 72 yo with newly diagnosed NSCLC stage IV . PDL1 was 80% but tumor NGS could not be performed because of QNS. Blood NGS through [facility] revealed- HRAS and TP53 mutations.\par  \par Pt started with carbo/alimta/pembro as front line option. Completed 4 cycles wo irAEs. \par Now on Keytruda monotherapy as maintenance. He has been tolerating well no irAEs.  Denies diarrhea, rash and SOB.   \par -Will continue on Keytruda q3w regimen.\par -Reviewed labs from last visit-hemolyzed specimen, will send iSTAT\par -Ct scans reviewed with pt- sustained response, No evidence of POD. Continue monitoring, Will get PET/T next time to see if any role for localized tx, \par OV in 6 weeks. \par

## 2022-02-08 ENCOUNTER — FORM ENCOUNTER (OUTPATIENT)
Age: 72
End: 2022-02-08

## 2022-02-15 ENCOUNTER — OUTPATIENT (OUTPATIENT)
Dept: OUTPATIENT SERVICES | Facility: HOSPITAL | Age: 72
LOS: 1 days | Discharge: HOME | End: 2022-02-15

## 2022-02-15 DIAGNOSIS — C34.11 MALIGNANT NEOPLASM OF UPPER LOBE, RIGHT BRONCHUS OR LUNG: ICD-10-CM

## 2022-02-15 DIAGNOSIS — Z98.890 OTHER SPECIFIED POSTPROCEDURAL STATES: Chronic | ICD-10-CM

## 2022-02-18 ENCOUNTER — RESULT REVIEW (OUTPATIENT)
Age: 72
End: 2022-02-18

## 2022-02-18 ENCOUNTER — APPOINTMENT (OUTPATIENT)
Dept: INFUSION THERAPY | Facility: HOSPITAL | Age: 72
End: 2022-02-18

## 2022-02-18 LAB
ALBUMIN SERPL ELPH-MCNC: 4.3 G/DL — SIGNIFICANT CHANGE UP (ref 3.3–5)
ALP SERPL-CCNC: 108 U/L — SIGNIFICANT CHANGE UP (ref 40–120)
ALT FLD-CCNC: 9 U/L — LOW (ref 10–45)
ANION GAP SERPL CALC-SCNC: 12 MMOL/L — SIGNIFICANT CHANGE UP (ref 5–17)
AST SERPL-CCNC: 25 U/L — SIGNIFICANT CHANGE UP (ref 10–40)
BASOPHILS # BLD AUTO: 0.11 K/UL — SIGNIFICANT CHANGE UP (ref 0–0.2)
BASOPHILS NFR BLD AUTO: 1.6 % — SIGNIFICANT CHANGE UP (ref 0–2)
BILIRUB SERPL-MCNC: 0.5 MG/DL — SIGNIFICANT CHANGE UP (ref 0.2–1.2)
BUN SERPL-MCNC: 17 MG/DL — SIGNIFICANT CHANGE UP (ref 7–23)
CALCIUM SERPL-MCNC: 9.7 MG/DL — SIGNIFICANT CHANGE UP (ref 8.4–10.5)
CHLORIDE SERPL-SCNC: 103 MMOL/L — SIGNIFICANT CHANGE UP (ref 96–108)
CO2 SERPL-SCNC: 23 MMOL/L — SIGNIFICANT CHANGE UP (ref 22–31)
CREAT SERPL-MCNC: 1.09 MG/DL — SIGNIFICANT CHANGE UP (ref 0.5–1.3)
EOSINOPHIL # BLD AUTO: 0.66 K/UL — HIGH (ref 0–0.5)
EOSINOPHIL NFR BLD AUTO: 9.8 % — HIGH (ref 0–6)
GLUCOSE SERPL-MCNC: 100 MG/DL — HIGH (ref 70–99)
HCT VFR BLD CALC: 44.1 % — SIGNIFICANT CHANGE UP (ref 39–50)
HGB BLD-MCNC: 14.2 G/DL — SIGNIFICANT CHANGE UP (ref 13–17)
IMM GRANULOCYTES NFR BLD AUTO: 0.3 % — SIGNIFICANT CHANGE UP (ref 0–1.5)
LYMPHOCYTES # BLD AUTO: 1.42 K/UL — SIGNIFICANT CHANGE UP (ref 1–3.3)
LYMPHOCYTES # BLD AUTO: 21 % — SIGNIFICANT CHANGE UP (ref 13–44)
MCHC RBC-ENTMCNC: 30.9 PG — SIGNIFICANT CHANGE UP (ref 27–34)
MCHC RBC-ENTMCNC: 32.2 G/DL — SIGNIFICANT CHANGE UP (ref 32–36)
MCV RBC AUTO: 95.9 FL — SIGNIFICANT CHANGE UP (ref 80–100)
MONOCYTES # BLD AUTO: 0.55 K/UL — SIGNIFICANT CHANGE UP (ref 0–0.9)
MONOCYTES NFR BLD AUTO: 8.1 % — SIGNIFICANT CHANGE UP (ref 2–14)
NEUTROPHILS # BLD AUTO: 4 K/UL — SIGNIFICANT CHANGE UP (ref 1.8–7.4)
NEUTROPHILS NFR BLD AUTO: 59.2 % — SIGNIFICANT CHANGE UP (ref 43–77)
NRBC # BLD: 0 /100 WBCS — SIGNIFICANT CHANGE UP (ref 0–0)
PLATELET # BLD AUTO: 244 K/UL — SIGNIFICANT CHANGE UP (ref 150–400)
POTASSIUM SERPL-MCNC: 5.5 MMOL/L — HIGH (ref 3.5–5.3)
POTASSIUM SERPL-SCNC: 5.5 MMOL/L — HIGH (ref 3.5–5.3)
PROT SERPL-MCNC: 6.9 G/DL — SIGNIFICANT CHANGE UP (ref 6–8.3)
RBC # BLD: 4.6 M/UL — SIGNIFICANT CHANGE UP (ref 4.2–5.8)
RBC # FLD: 12.5 % — SIGNIFICANT CHANGE UP (ref 10.3–14.5)
SODIUM SERPL-SCNC: 137 MMOL/L — SIGNIFICANT CHANGE UP (ref 135–145)
TSH SERPL-MCNC: 2.45 UIU/ML — SIGNIFICANT CHANGE UP (ref 0.27–4.2)
WBC # BLD: 6.76 K/UL — SIGNIFICANT CHANGE UP (ref 3.8–10.5)
WBC # FLD AUTO: 6.76 K/UL — SIGNIFICANT CHANGE UP (ref 3.8–10.5)

## 2022-02-22 ENCOUNTER — RX RENEWAL (OUTPATIENT)
Age: 72
End: 2022-02-22

## 2022-02-22 DIAGNOSIS — R11.2 NAUSEA WITH VOMITING, UNSPECIFIED: ICD-10-CM

## 2022-02-22 DIAGNOSIS — Z51.11 ENCOUNTER FOR ANTINEOPLASTIC CHEMOTHERAPY: ICD-10-CM

## 2022-02-22 RX ORDER — FOLIC ACID 1 MG/1
1 TABLET ORAL DAILY
Qty: 30 | Refills: 9 | Status: ACTIVE | COMMUNITY
Start: 2020-10-27 | End: 1900-01-01

## 2022-03-11 ENCOUNTER — RESULT REVIEW (OUTPATIENT)
Age: 72
End: 2022-03-11

## 2022-03-11 ENCOUNTER — APPOINTMENT (OUTPATIENT)
Dept: HEMATOLOGY ONCOLOGY | Facility: CLINIC | Age: 72
End: 2022-03-11
Payer: COMMERCIAL

## 2022-03-11 ENCOUNTER — APPOINTMENT (OUTPATIENT)
Dept: INFUSION THERAPY | Facility: HOSPITAL | Age: 72
End: 2022-03-11

## 2022-03-11 ENCOUNTER — APPOINTMENT (OUTPATIENT)
Dept: HEMATOLOGY ONCOLOGY | Facility: CLINIC | Age: 72
End: 2022-03-11

## 2022-03-11 LAB
ALBUMIN SERPL ELPH-MCNC: 4.6 G/DL — SIGNIFICANT CHANGE UP (ref 3.3–5)
ALP SERPL-CCNC: 117 U/L — SIGNIFICANT CHANGE UP (ref 40–120)
ALT FLD-CCNC: 13 U/L — SIGNIFICANT CHANGE UP (ref 10–45)
ANION GAP SERPL CALC-SCNC: 19 MMOL/L — HIGH (ref 5–17)
AST SERPL-CCNC: 25 U/L — SIGNIFICANT CHANGE UP (ref 10–40)
BASOPHILS # BLD AUTO: 0.09 K/UL — SIGNIFICANT CHANGE UP (ref 0–0.2)
BASOPHILS NFR BLD AUTO: 1.2 % — SIGNIFICANT CHANGE UP (ref 0–2)
BILIRUB SERPL-MCNC: 0.4 MG/DL — SIGNIFICANT CHANGE UP (ref 0.2–1.2)
BUN SERPL-MCNC: 17 MG/DL — SIGNIFICANT CHANGE UP (ref 7–23)
CALCIUM SERPL-MCNC: 9.7 MG/DL — SIGNIFICANT CHANGE UP (ref 8.4–10.5)
CHLORIDE SERPL-SCNC: 103 MMOL/L — SIGNIFICANT CHANGE UP (ref 96–108)
CO2 SERPL-SCNC: 18 MMOL/L — LOW (ref 22–31)
CREAT SERPL-MCNC: 1.12 MG/DL — SIGNIFICANT CHANGE UP (ref 0.5–1.3)
EGFR: 71 ML/MIN/1.73M2 — SIGNIFICANT CHANGE UP
EOSINOPHIL # BLD AUTO: 0.95 K/UL — HIGH (ref 0–0.5)
EOSINOPHIL NFR BLD AUTO: 12.5 % — HIGH (ref 0–6)
GLUCOSE SERPL-MCNC: 89 MG/DL — SIGNIFICANT CHANGE UP (ref 70–99)
HCT VFR BLD CALC: 46.6 % — SIGNIFICANT CHANGE UP (ref 39–50)
HGB BLD-MCNC: 14.8 G/DL — SIGNIFICANT CHANGE UP (ref 13–17)
IMM GRANULOCYTES NFR BLD AUTO: 0.3 % — SIGNIFICANT CHANGE UP (ref 0–1.5)
LYMPHOCYTES # BLD AUTO: 1.71 K/UL — SIGNIFICANT CHANGE UP (ref 1–3.3)
LYMPHOCYTES # BLD AUTO: 22.6 % — SIGNIFICANT CHANGE UP (ref 13–44)
MCHC RBC-ENTMCNC: 30.8 PG — SIGNIFICANT CHANGE UP (ref 27–34)
MCHC RBC-ENTMCNC: 31.8 G/DL — LOW (ref 32–36)
MCV RBC AUTO: 96.9 FL — SIGNIFICANT CHANGE UP (ref 80–100)
MONOCYTES # BLD AUTO: 0.62 K/UL — SIGNIFICANT CHANGE UP (ref 0–0.9)
MONOCYTES NFR BLD AUTO: 8.2 % — SIGNIFICANT CHANGE UP (ref 2–14)
NEUTROPHILS # BLD AUTO: 4.18 K/UL — SIGNIFICANT CHANGE UP (ref 1.8–7.4)
NEUTROPHILS NFR BLD AUTO: 55.2 % — SIGNIFICANT CHANGE UP (ref 43–77)
NRBC # BLD: 0 /100 WBCS — SIGNIFICANT CHANGE UP (ref 0–0)
PLATELET # BLD AUTO: 269 K/UL — SIGNIFICANT CHANGE UP (ref 150–400)
POTASSIUM SERPL-MCNC: 5.4 MMOL/L — HIGH (ref 3.5–5.3)
POTASSIUM SERPL-SCNC: 5.4 MMOL/L — HIGH (ref 3.5–5.3)
PROT SERPL-MCNC: 7.5 G/DL — SIGNIFICANT CHANGE UP (ref 6–8.3)
RBC # BLD: 4.81 M/UL — SIGNIFICANT CHANGE UP (ref 4.2–5.8)
RBC # FLD: 12.5 % — SIGNIFICANT CHANGE UP (ref 10.3–14.5)
SODIUM SERPL-SCNC: 140 MMOL/L — SIGNIFICANT CHANGE UP (ref 135–145)
TSH SERPL-MCNC: 2.01 UIU/ML — SIGNIFICANT CHANGE UP (ref 0.27–4.2)
WBC # BLD: 7.57 K/UL — SIGNIFICANT CHANGE UP (ref 3.8–10.5)
WBC # FLD AUTO: 7.57 K/UL — SIGNIFICANT CHANGE UP (ref 3.8–10.5)

## 2022-03-11 PROCEDURE — 99214 OFFICE O/P EST MOD 30 MIN: CPT

## 2022-03-11 NOTE — ASSESSMENT
[Palliative] : Goals of care discussed with patient: Palliative [FreeTextEntry1] : 72 yo with newly diagnosed NSCLC stage IV . PDL1 was 80% but tumor NGS showed high TMB. [facility]- HRAS and TP53 mutations.\par  \par Pt started with carbo/alimta/pembro as front line option on 11/6/2020. Completed 4 cycles wo irAEs. \par Now on Keytruda monotherapy as maintenance. He has been tolerating well no irAEs.  Denies diarrhea, rash and SOB, cough.\par Will continue on Keytruda q3w regimen\par PET/CT 12/13/21: reviewed in detail personally- excellent near-CR\par CT chest and abdomen ordered last visit. Auth not obtained as yet. \par Will alert auth team and request it to be done ASAP\par Follow up with PCP and pulm\par OV in 6 weeks after scans\par We also discussed the importance of close monitoring with labs, toxicity and efficacy assessment, while on this high risk medication (Keytruda). Pt verbalized understanding. \par \par \par

## 2022-03-11 NOTE — REVIEW OF SYSTEMS
[Recent Change In Weight] : ~T recent weight change [Negative] : Heme/Lymph [Fatigue] : no fatigue [Chest Pain] : no chest pain [SOB on Exertion] : no shortness of breath during exertion [FreeTextEntry2] : gained a little weight, working full time again

## 2022-03-11 NOTE — HISTORY OF PRESENT ILLNESS
[Disease: _____________________] : Disease: [unfilled] [M: ___] : M[unfilled] [AJCC Stage: ____] : AJCC Stage: [unfilled] [de-identified] : adeno ca [de-identified] : 70 yo M with tobacco smoking hx and 911 exposure ( being followed at [facility],  was diagnosed with COPD/emphysema (dx soon after 9/11), had screening CT in 2019 which showed a lung mass. Work up at [facility]- solitary mass which was resected (benign-organizing PNA). He was referred to pulm grant a year ago by [facility] program but he made an appointment this year with Dr. Pritchard. He referred him for screening CT in Feb which was done in August and showed right upper lobe 3.7 x 3.8 cm mass with interval increase in size since January 7, 2019 worrisome for neoplasm possibly a primary lung neoplasm. Multiple bilateral smaller nodular opacities are new since January 7, 2019 may represent metastatic disease. Enlarged anterior mediastinal lymph node worrisome for metastatic disease. PET/CT 8/25/2020 IMPRESSION: Since outside PET/CT 1/30/2019: Considerable interval increased size and FDG avidity of a now spiculated right upper lobe mass with central necrosis suspicious for primary lung malignancy. New FDG avid enlarged mediastinal lymph nodes and bilateral pulmonary nodules probably metastatic disease. Indeterminate FDG avid left intraparotid focus, not well evaluated secondary to misregistration artifact, however appears new since prior PET/CT, possibly metastatic. He underwent CT guided bx by Dr. Barraza at [facility] which was suggestive of malignancy. He subsequently underwent bronch and EBUS/bx of level 4 LN which was c/w adeno ca, lung primary. He is still doing well from a symptomatic standpoint. He has lost some weight which he usually does every Summer. He denies fevers, chills, dyspnea, chest pain/tightness, cough, wheeze. Continues to work on a full-time basis as a . \par 10/27/20: pt has no new complaints. He had Ct scans and MRi of brain interval enlargement of multiple bilateral pulmonary masses and mediastinal adenopathy consistent with progressive disease. CT abdomen and MRI brain revealed no mets. \par \par 11/6/2020: Pt presents prior to starting treatment today to review regimen, address any concerns and provide written consent. He offers no new complaints. \par \par 11/27/2020: Pt returns for follow up. S/p first cycle. Tolerating well . NOT SMOKING!!!!! Doing well. Had one episode of near syncope (vaso-vagal). Pt states he was very dehydrated. No further episodes since. Pt states he "feels great"\par \par 12/18/2020: Pt returns for follow up. Tolerating treatment wo any AE. Only reports mild occasional fatigue. Scheduled for C#3 today\par \par 1/8/2021: Pt returns for follow up and discussion of findings on recent imaging. He is feeling well. Appetite good. Occasional fatigue. States he is losing his hair and is very unhappy about it. \par \par 1/14/21: Patient came to clinic for follow up today, he is s/p C4 Carbo/Alimta, Keytruda on 1/8/21, he reports worsening fatigue, poor appetite but stable weight, also reports  for past 2-3 days and  today, reports burning sensation feeling in chest that radiates to his throat and  nausea. Denies sob and skin rash. He did not take any BP meds today as his BP was in 100s He also experiences constipation. ECG was done in the office and showed some flip T waves in anterior leads. Plan to send him to [facility] to evaluate.\par \par 1/29/21: Pt had a prolonged recovery period after last treatment. Nausea, dysphagia, weight loss, and chest pain. As noted above, pt was hospitalized. No acute etiology was found and he was discharged home., He feels better now. Started eating again. CT scan in the hospital on 1/14 revealed  \par 1. Small right upper lobe pulmonary embolism. No evidence of pulmonary infarct or right heart strain.\par 2. Right upper lobe pulmonary mass and bilateral pulmonary nodules are unchanged from 01/05/2021.\par 3. There is extensive wall thickening in the proximal duodenum and within left upper quadrant small bowel loops, compatible with enteritis. Upper abdominal free fluid seen between the duodenum and pancreas and within the left upper quadrant, inferior to the pancreas, may be related to enteritis. Superimposed pancreatitis should be excluded clinically.\par 4. Free fluid in the right perinephric space, posterior-medial to the right kidney is new from 01/05/2021. Underlying genitourinary infection/pyelonephritis should be excluded clinically.\par \par He has lost a lot of weight since last visit. \par \par 2/19/21: Pt reports feeling much better. No ae from last treatment with monotherapy Keytruda. Pt has been seeing GI and cologard colon cancer assay was reported as positive and patient has been scheduled for colonoscopy on 3/19/21. No new complaints. \par \par 4/2/21: Pt returns for follow up. He is feeling well. States he had colonoscopy and endoscopy last week and "everything was normal." Tolerating treatment well. Offers no complaints. \par \par 4/23/21: Pt returns for follow up and discussion of findings on recent imaging. Offers no complaints. Feeling well. Scheduled for treatment today. \par \par 5/11/21: Pt feeling well, doing good on Keytruda. Weight is stable, has no complaints.\par \par 6/4/21: pt returns for f/u. Feels well. No irAE but pt describes extremely poor appetite. Losing weight. No other complaints\par \par 7/16/21:  Pt here for f/u. He is currently receiving  Keytruda monotherpy today with no irEAs. Pt denies SOB, rash, diarrhea, cough and itching.  He feels well and offers no complaints. Pt weight is stable and he verbalized increase appetite.  \par \par 8/27/21: Pt returns for follow up. He is feeling well but expresses concern about not being able to gain weight. No other concerns /complaints.\par \par 9/17/21:  Patient presented for f/u visit. Patient denies SOB, KENNEDY, diarrhea, rash and other irAEs.  Patient gained 1lb. Patient offers no other complaints and verbalized that he is feeling well.  \par \par 10/8/21: No complaints. \par \par 12/15/21: No complaints. No irAEs\par \par 1/28/22: Patient seen in treatment room for follow up. Reports doing well without any complaints. Denies worsening SOB, cough CP, abdominal pain, n/v/d, itching. Reports feeling generally well overall. \par \par 3/11/22: Pt has no symptoms to report. He is doing well without any irAEs [de-identified] : PDL1 80%

## 2022-03-14 ENCOUNTER — NON-APPOINTMENT (OUTPATIENT)
Age: 72
End: 2022-03-14

## 2022-03-28 ENCOUNTER — OUTPATIENT (OUTPATIENT)
Dept: OUTPATIENT SERVICES | Facility: HOSPITAL | Age: 72
LOS: 1 days | Discharge: HOME | End: 2022-03-28

## 2022-03-28 DIAGNOSIS — Z98.890 OTHER SPECIFIED POSTPROCEDURAL STATES: Chronic | ICD-10-CM

## 2022-03-28 DIAGNOSIS — C34.11 MALIGNANT NEOPLASM OF UPPER LOBE, RIGHT BRONCHUS OR LUNG: ICD-10-CM

## 2022-04-01 ENCOUNTER — RESULT REVIEW (OUTPATIENT)
Age: 72
End: 2022-04-01

## 2022-04-01 ENCOUNTER — APPOINTMENT (OUTPATIENT)
Dept: INFUSION THERAPY | Facility: HOSPITAL | Age: 72
End: 2022-04-01

## 2022-04-01 LAB
ALBUMIN SERPL ELPH-MCNC: 4.5 G/DL — SIGNIFICANT CHANGE UP (ref 3.3–5)
ALP SERPL-CCNC: 119 U/L — SIGNIFICANT CHANGE UP (ref 40–120)
ALT FLD-CCNC: 12 U/L — SIGNIFICANT CHANGE UP (ref 10–45)
ANION GAP SERPL CALC-SCNC: 16 MMOL/L — SIGNIFICANT CHANGE UP (ref 5–17)
AST SERPL-CCNC: 30 U/L — SIGNIFICANT CHANGE UP (ref 10–40)
BASOPHILS # BLD AUTO: 0.1 K/UL — SIGNIFICANT CHANGE UP (ref 0–0.2)
BASOPHILS NFR BLD AUTO: 1.5 % — SIGNIFICANT CHANGE UP (ref 0–2)
BILIRUB SERPL-MCNC: 0.4 MG/DL — SIGNIFICANT CHANGE UP (ref 0.2–1.2)
BUN SERPL-MCNC: 16 MG/DL — SIGNIFICANT CHANGE UP (ref 7–23)
CALCIUM SERPL-MCNC: 9.4 MG/DL — SIGNIFICANT CHANGE UP (ref 8.4–10.5)
CHLORIDE SERPL-SCNC: 102 MMOL/L — SIGNIFICANT CHANGE UP (ref 96–108)
CO2 SERPL-SCNC: 19 MMOL/L — LOW (ref 22–31)
CREAT SERPL-MCNC: 1.03 MG/DL — SIGNIFICANT CHANGE UP (ref 0.5–1.3)
EGFR: 79 ML/MIN/1.73M2 — SIGNIFICANT CHANGE UP
EOSINOPHIL # BLD AUTO: 0.83 K/UL — HIGH (ref 0–0.5)
EOSINOPHIL NFR BLD AUTO: 12.7 % — HIGH (ref 0–6)
GLUCOSE SERPL-MCNC: 80 MG/DL — SIGNIFICANT CHANGE UP (ref 70–99)
HCT VFR BLD CALC: 45.8 % — SIGNIFICANT CHANGE UP (ref 39–50)
HGB BLD-MCNC: 15 G/DL — SIGNIFICANT CHANGE UP (ref 13–17)
IMM GRANULOCYTES NFR BLD AUTO: 0.3 % — SIGNIFICANT CHANGE UP (ref 0–1.5)
LYMPHOCYTES # BLD AUTO: 1.48 K/UL — SIGNIFICANT CHANGE UP (ref 1–3.3)
LYMPHOCYTES # BLD AUTO: 22.6 % — SIGNIFICANT CHANGE UP (ref 13–44)
MCHC RBC-ENTMCNC: 31.3 PG — SIGNIFICANT CHANGE UP (ref 27–34)
MCHC RBC-ENTMCNC: 32.8 G/DL — SIGNIFICANT CHANGE UP (ref 32–36)
MCV RBC AUTO: 95.4 FL — SIGNIFICANT CHANGE UP (ref 80–100)
MONOCYTES # BLD AUTO: 0.5 K/UL — SIGNIFICANT CHANGE UP (ref 0–0.9)
MONOCYTES NFR BLD AUTO: 7.6 % — SIGNIFICANT CHANGE UP (ref 2–14)
NEUTROPHILS # BLD AUTO: 3.62 K/UL — SIGNIFICANT CHANGE UP (ref 1.8–7.4)
NEUTROPHILS NFR BLD AUTO: 55.3 % — SIGNIFICANT CHANGE UP (ref 43–77)
NRBC # BLD: 0 /100 WBCS — SIGNIFICANT CHANGE UP (ref 0–0)
PLATELET # BLD AUTO: 256 K/UL — SIGNIFICANT CHANGE UP (ref 150–400)
POTASSIUM SERPL-MCNC: 5.9 MMOL/L — HIGH (ref 3.5–5.3)
POTASSIUM SERPL-SCNC: 5.9 MMOL/L — HIGH (ref 3.5–5.3)
PROT SERPL-MCNC: 7.8 G/DL — SIGNIFICANT CHANGE UP (ref 6–8.3)
RBC # BLD: 4.8 M/UL — SIGNIFICANT CHANGE UP (ref 4.2–5.8)
RBC # FLD: 12.7 % — SIGNIFICANT CHANGE UP (ref 10.3–14.5)
SODIUM SERPL-SCNC: 137 MMOL/L — SIGNIFICANT CHANGE UP (ref 135–145)
TSH SERPL-MCNC: 2.2 UIU/ML — SIGNIFICANT CHANGE UP (ref 0.27–4.2)
WBC # BLD: 6.55 K/UL — SIGNIFICANT CHANGE UP (ref 3.8–10.5)
WBC # FLD AUTO: 6.55 K/UL — SIGNIFICANT CHANGE UP (ref 3.8–10.5)

## 2022-04-03 DIAGNOSIS — R11.2 NAUSEA WITH VOMITING, UNSPECIFIED: ICD-10-CM

## 2022-04-03 DIAGNOSIS — Z51.11 ENCOUNTER FOR ANTINEOPLASTIC CHEMOTHERAPY: ICD-10-CM

## 2022-04-20 ENCOUNTER — RESULT REVIEW (OUTPATIENT)
Age: 72
End: 2022-04-20

## 2022-04-22 ENCOUNTER — RESULT REVIEW (OUTPATIENT)
Age: 72
End: 2022-04-22

## 2022-04-22 ENCOUNTER — APPOINTMENT (OUTPATIENT)
Dept: HEMATOLOGY ONCOLOGY | Facility: CLINIC | Age: 72
End: 2022-04-22

## 2022-04-22 ENCOUNTER — APPOINTMENT (OUTPATIENT)
Dept: INFUSION THERAPY | Facility: HOSPITAL | Age: 72
End: 2022-04-22

## 2022-04-22 LAB
ALBUMIN SERPL ELPH-MCNC: 4.2 G/DL — SIGNIFICANT CHANGE UP (ref 3.3–5)
ALP SERPL-CCNC: 103 U/L — SIGNIFICANT CHANGE UP (ref 40–120)
ALT FLD-CCNC: 13 U/L — SIGNIFICANT CHANGE UP (ref 10–45)
ANION GAP SERPL CALC-SCNC: 13 MMOL/L — SIGNIFICANT CHANGE UP (ref 5–17)
AST SERPL-CCNC: 21 U/L — SIGNIFICANT CHANGE UP (ref 10–40)
BASOPHILS # BLD AUTO: 0.09 K/UL — SIGNIFICANT CHANGE UP (ref 0–0.2)
BASOPHILS NFR BLD AUTO: 1.4 % — SIGNIFICANT CHANGE UP (ref 0–2)
BILIRUB SERPL-MCNC: 0.5 MG/DL — SIGNIFICANT CHANGE UP (ref 0.2–1.2)
BUN SERPL-MCNC: 17 MG/DL — SIGNIFICANT CHANGE UP (ref 7–23)
CALCIUM SERPL-MCNC: 9.7 MG/DL — SIGNIFICANT CHANGE UP (ref 8.4–10.5)
CHLORIDE SERPL-SCNC: 101 MMOL/L — SIGNIFICANT CHANGE UP (ref 96–108)
CO2 SERPL-SCNC: 22 MMOL/L — SIGNIFICANT CHANGE UP (ref 22–31)
CREAT SERPL-MCNC: 1.16 MG/DL — SIGNIFICANT CHANGE UP (ref 0.5–1.3)
EGFR: 68 ML/MIN/1.73M2 — SIGNIFICANT CHANGE UP
EOSINOPHIL # BLD AUTO: 0.75 K/UL — HIGH (ref 0–0.5)
EOSINOPHIL NFR BLD AUTO: 11.9 % — HIGH (ref 0–6)
GLUCOSE SERPL-MCNC: 86 MG/DL — SIGNIFICANT CHANGE UP (ref 70–99)
HCT VFR BLD CALC: 45.3 % — SIGNIFICANT CHANGE UP (ref 39–50)
HGB BLD-MCNC: 14.6 G/DL — SIGNIFICANT CHANGE UP (ref 13–17)
IMM GRANULOCYTES NFR BLD AUTO: 0.3 % — SIGNIFICANT CHANGE UP (ref 0–1.5)
LYMPHOCYTES # BLD AUTO: 1.29 K/UL — SIGNIFICANT CHANGE UP (ref 1–3.3)
LYMPHOCYTES # BLD AUTO: 20.4 % — SIGNIFICANT CHANGE UP (ref 13–44)
MCHC RBC-ENTMCNC: 30.7 PG — SIGNIFICANT CHANGE UP (ref 27–34)
MCHC RBC-ENTMCNC: 32.2 G/DL — SIGNIFICANT CHANGE UP (ref 32–36)
MCV RBC AUTO: 95.2 FL — SIGNIFICANT CHANGE UP (ref 80–100)
MONOCYTES # BLD AUTO: 0.52 K/UL — SIGNIFICANT CHANGE UP (ref 0–0.9)
MONOCYTES NFR BLD AUTO: 8.2 % — SIGNIFICANT CHANGE UP (ref 2–14)
NEUTROPHILS # BLD AUTO: 3.64 K/UL — SIGNIFICANT CHANGE UP (ref 1.8–7.4)
NEUTROPHILS NFR BLD AUTO: 57.8 % — SIGNIFICANT CHANGE UP (ref 43–77)
NRBC # BLD: 0 /100 WBCS — SIGNIFICANT CHANGE UP (ref 0–0)
PLATELET # BLD AUTO: 240 K/UL — SIGNIFICANT CHANGE UP (ref 150–400)
POTASSIUM SERPL-MCNC: 4.9 MMOL/L — SIGNIFICANT CHANGE UP (ref 3.5–5.3)
POTASSIUM SERPL-SCNC: 4.9 MMOL/L — SIGNIFICANT CHANGE UP (ref 3.5–5.3)
PROT SERPL-MCNC: 7.3 G/DL — SIGNIFICANT CHANGE UP (ref 6–8.3)
RBC # BLD: 4.76 M/UL — SIGNIFICANT CHANGE UP (ref 4.2–5.8)
RBC # FLD: 12.8 % — SIGNIFICANT CHANGE UP (ref 10.3–14.5)
SODIUM SERPL-SCNC: 136 MMOL/L — SIGNIFICANT CHANGE UP (ref 135–145)
TSH SERPL-MCNC: 2.46 UIU/ML — SIGNIFICANT CHANGE UP (ref 0.27–4.2)
WBC # BLD: 6.31 K/UL — SIGNIFICANT CHANGE UP (ref 3.8–10.5)
WBC # FLD AUTO: 6.31 K/UL — SIGNIFICANT CHANGE UP (ref 3.8–10.5)

## 2022-04-23 ENCOUNTER — APPOINTMENT (OUTPATIENT)
Dept: CT IMAGING | Facility: IMAGING CENTER | Age: 72
End: 2022-04-23
Payer: COMMERCIAL

## 2022-04-23 ENCOUNTER — OUTPATIENT (OUTPATIENT)
Dept: OUTPATIENT SERVICES | Facility: HOSPITAL | Age: 72
LOS: 1 days | End: 2022-04-23
Payer: COMMERCIAL

## 2022-04-23 DIAGNOSIS — Z00.8 ENCOUNTER FOR OTHER GENERAL EXAMINATION: ICD-10-CM

## 2022-04-23 DIAGNOSIS — Z98.890 OTHER SPECIFIED POSTPROCEDURAL STATES: Chronic | ICD-10-CM

## 2022-04-23 DIAGNOSIS — C34.90 MALIGNANT NEOPLASM OF UNSPECIFIED PART OF UNSPECIFIED BRONCHUS OR LUNG: ICD-10-CM

## 2022-04-23 PROCEDURE — 71260 CT THORAX DX C+: CPT

## 2022-04-23 PROCEDURE — 74160 CT ABDOMEN W/CONTRAST: CPT

## 2022-04-23 PROCEDURE — 74160 CT ABDOMEN W/CONTRAST: CPT | Mod: 26

## 2022-04-23 PROCEDURE — 71260 CT THORAX DX C+: CPT | Mod: 26

## 2022-04-25 ENCOUNTER — NON-APPOINTMENT (OUTPATIENT)
Age: 72
End: 2022-04-25

## 2022-05-02 ENCOUNTER — OUTPATIENT (OUTPATIENT)
Dept: OUTPATIENT SERVICES | Facility: HOSPITAL | Age: 72
LOS: 1 days | Discharge: HOME | End: 2022-05-02

## 2022-05-02 DIAGNOSIS — C34.11 MALIGNANT NEOPLASM OF UPPER LOBE, RIGHT BRONCHUS OR LUNG: ICD-10-CM

## 2022-05-02 DIAGNOSIS — Z98.890 OTHER SPECIFIED POSTPROCEDURAL STATES: Chronic | ICD-10-CM

## 2022-05-05 ENCOUNTER — APPOINTMENT (OUTPATIENT)
Dept: HEMATOLOGY ONCOLOGY | Facility: CLINIC | Age: 72
End: 2022-05-05
Payer: COMMERCIAL

## 2022-05-05 DIAGNOSIS — Z51.11 ENCOUNTER FOR ANTINEOPLASTIC CHEMOTHERAPY: ICD-10-CM

## 2022-05-05 DIAGNOSIS — Z51.12 ENCOUNTER FOR ANTINEOPLASTIC CHEMOTHERAPY: ICD-10-CM

## 2022-05-05 PROCEDURE — 99214 OFFICE O/P EST MOD 30 MIN: CPT | Mod: 95

## 2022-05-11 PROBLEM — Z51.11 ENCOUNTER FOR ANTINEOPLASTIC CHEMOTHERAPY AND IMMUNOTHERAPY: Status: ACTIVE | Noted: 2022-05-11

## 2022-05-11 NOTE — ASSESSMENT
[Palliative] : Goals of care discussed with patient: Palliative [FreeTextEntry1] : 71 yo with newly diagnosed NSCLC stage IV . PDL1 was 80% but tumor NGS showed high TMB. Blood NGS through [facility] revealed- HRAS and TP53 mutations.\par  \par Pt started with carbo/alimta/pembro as front line option on 11/6/2020. Completed 4 cycles wo irAEs. \par Now on Keytruda monotherapy as maintenance. He has been tolerating well no irAEs.  Denies diarrhea, rash and SOB, cough.\par Will continue on Keytruda q3w regimen\par PET/CT 12/13/21: reviewed in detail personally- excellent near-CR\par CT chest and abdomen from April 2022 shows sustained response. Nothinh new. \par Scans reviewed independently and discussed findings with pt. \par Labs reviewed in detail and discussed with pt.\par Follow up with PCP and pulm\par OV in 6 weeks \par Rpeat scans in 3 month\par We also discussed the importance of close monitoring with labs, toxicity and efficacy assessment, while on this high risk medication (Keytruda). Pt verbalized understanding. \par \par \par

## 2022-05-11 NOTE — HISTORY OF PRESENT ILLNESS
[Disease: _____________________] : Disease: [unfilled] [M: ___] : M[unfilled] [AJCC Stage: ____] : AJCC Stage: [unfilled] [Home] : at home, [unfilled] , at the time of the visit. [Medical Office: ([facility])___] : at the medical office located in  [Spouse] : spouse [de-identified] : 72 yo M with tobacco smoking hx and 911 exposure ( being followed at [facility],  was diagnosed with COPD/emphysema (dx soon after 9/11), had screening CT in 2019 which showed a lung mass. Work up at [facility]- solitary mass which was resected (benign-organizing PNA). He was referred to pulm grant a year ago by [facility] program but he made an appointment this year with Dr. Pritchard. He referred him for screening CT in Feb which was done in August and showed right upper lobe 3.7 x 3.8 cm mass with interval increase in size since January 7, 2019 worrisome for neoplasm possibly a primary lung neoplasm. Multiple bilateral smaller nodular opacities are new since January 7, 2019 may represent metastatic disease. Enlarged anterior mediastinal lymph node worrisome for metastatic disease. PET/CT 8/25/2020 IMPRESSION: Since outside PET/CT 1/30/2019: Considerable interval increased size and FDG avidity of a now spiculated right upper lobe mass with central necrosis suspicious for primary lung malignancy. New FDG avid enlarged mediastinal lymph nodes and bilateral pulmonary nodules probably metastatic disease. Indeterminate FDG avid left intraparotid focus, not well evaluated secondary to misregistration artifact, however appears new since prior PET/CT, possibly metastatic. He underwent CT guided bx by Dr. Barraza at [facility] which was suggestive of malignancy. He subsequently underwent bronch and EBUS/bx of level 4 LN which was c/w adeno ca, lung primary. He is still doing well from a symptomatic standpoint. He has lost some weight which he usually does every Summer. He denies fevers, chills, dyspnea, chest pain/tightness, cough, wheeze. Continues to work on a full-time basis as a . \par 10/27/20: pt has no new complaints. He had Ct scans and MRi of brain interval enlargement of multiple bilateral pulmonary masses and mediastinal adenopathy consistent with progressive disease. CT abdomen and MRI brain revealed no mets. \par \par 11/6/2020: Pt presents prior to starting treatment today to review regimen, address any concerns and provide written consent. He offers no new complaints. \par \par 11/27/2020: Pt returns for follow up. S/p first cycle. Tolerating well . NOT SMOKING!!!!! Doing well. Had one episode of near syncope (vaso-vagal). Pt states he was very dehydrated. No further episodes since. Pt states he "feels great"\par \par 12/18/2020: Pt returns for follow up. Tolerating treatment wo any AE. Only reports mild occasional fatigue. Scheduled for C#3 today\par \par 1/8/2021: Pt returns for follow up and discussion of findings on recent imaging. He is feeling well. Appetite good. Occasional fatigue. States he is losing his hair and is very unhappy about it. \par \par 1/14/21: Patient came to clinic for follow up today, he is s/p C4 Carbo/Alimta, Keytruda on 1/8/21, he reports worsening fatigue, poor appetite but stable weight, also reports  for past 2-3 days and  today, reports burning sensation feeling in chest that radiates to his throat and  nausea. Denies sob and skin rash. He did not take any BP meds today as his BP was in 100s He also experiences constipation. ECG was done in the office and showed some flip T waves in anterior leads. Plan to send him to [facility] to evaluate.\par \par 1/29/21: Pt had a prolonged recovery period after last treatment. Nausea, dysphagia, weight loss, and chest pain. As noted above, pt was hospitalized. No acute etiology was found and he was discharged home., He feels better now. Started eating again. CT scan in the hospital on 1/14 revealed  \par 1. Small right upper lobe pulmonary embolism. No evidence of pulmonary infarct or right heart strain.\par 2. Right upper lobe pulmonary mass and bilateral pulmonary nodules are unchanged from 01/05/2021.\par 3. There is extensive wall thickening in the proximal duodenum and within left upper quadrant small bowel loops, compatible with enteritis. Upper abdominal free fluid seen between the duodenum and pancreas and within the left upper quadrant, inferior to the pancreas, may be related to enteritis. Superimposed pancreatitis should be excluded clinically.\par 4. Free fluid in the right perinephric space, posterior-medial to the right kidney is new from 01/05/2021. Underlying genitourinary infection/pyelonephritis should be excluded clinically.\par \par He has lost a lot of weight since last visit. \par \par 2/19/21: Pt reports feeling much better. No ae from last treatment with monotherapy Keytruda. Pt has been seeing GI and cologard colon cancer assay was reported as positive and patient has been scheduled for colonoscopy on 3/19/21. No new complaints. \par \par 4/2/21: Pt returns for follow up. He is feeling well. States he had colonoscopy and endoscopy last week and "everything was normal." Tolerating treatment well. Offers no complaints. \par \par 4/23/21: Pt returns for follow up and discussion of findings on recent imaging. Offers no complaints. Feeling well. Scheduled for treatment today. \par \par 5/11/21: Pt feeling well, doing good on Keytruda. Weight is stable, has no complaints.\par \par 6/4/21: pt returns for f/u. Feels well. No irAE but pt describes extremely poor appetite. Losing weight. No other complaints\par \par 7/16/21:  Pt here for f/u. He is currently receiving  Keytruda monotherpy today with no irEAs. Pt denies SOB, rash, diarrhea, cough and itching.  He feels well and offers no complaints. Pt weight is stable and he verbalized increase appetite.  \par \par 8/27/21: Pt returns for follow up. He is feeling well but expresses concern about not being able to gain weight. No other concerns /complaints.\par \par 9/17/21:  Patient presented for f/u visit. Patient denies SOB, KENNEDY, diarrhea, rash and other irAEs.  Patient gained 1lb. Patient offers no other complaints and verbalized that he is feeling well.  \par \par 10/8/21: No complaints. \par \par 12/15/21: No complaints. No irAEs\par \par 1/28/22: Patient seen in treatment room for follow up. Reports doing well without any complaints. Denies worsening SOB, cough CP, abdominal pain, n/v/d, itching. Reports feeling generally well overall. \par \par 3/11/22: Pt has no symptoms to report. He is doing well without any irAEs\par \par 5/5/22: Pt seen via tele-visit for follow up. he has no new symptoms. Tolerating Keytruda without any irAEs.  [de-identified] : adeno ca [de-identified] : PDL1 80%

## 2022-05-13 ENCOUNTER — RESULT REVIEW (OUTPATIENT)
Age: 72
End: 2022-05-13

## 2022-05-13 ENCOUNTER — APPOINTMENT (OUTPATIENT)
Dept: INFUSION THERAPY | Facility: HOSPITAL | Age: 72
End: 2022-05-13

## 2022-05-13 LAB
ALBUMIN SERPL ELPH-MCNC: 4.3 G/DL — SIGNIFICANT CHANGE UP (ref 3.3–5)
ALP SERPL-CCNC: 109 U/L — SIGNIFICANT CHANGE UP (ref 40–120)
ALT FLD-CCNC: 9 U/L — LOW (ref 10–45)
ANION GAP SERPL CALC-SCNC: 16 MMOL/L — SIGNIFICANT CHANGE UP (ref 5–17)
AST SERPL-CCNC: 20 U/L — SIGNIFICANT CHANGE UP (ref 10–40)
BASOPHILS # BLD AUTO: 0.08 K/UL — SIGNIFICANT CHANGE UP (ref 0–0.2)
BASOPHILS NFR BLD AUTO: 1 % — SIGNIFICANT CHANGE UP (ref 0–2)
BILIRUB SERPL-MCNC: 0.7 MG/DL — SIGNIFICANT CHANGE UP (ref 0.2–1.2)
BUN SERPL-MCNC: 19 MG/DL — SIGNIFICANT CHANGE UP (ref 7–23)
CALCIUM SERPL-MCNC: 9.4 MG/DL — SIGNIFICANT CHANGE UP (ref 8.4–10.5)
CHLORIDE SERPL-SCNC: 101 MMOL/L — SIGNIFICANT CHANGE UP (ref 96–108)
CO2 SERPL-SCNC: 21 MMOL/L — LOW (ref 22–31)
CREAT SERPL-MCNC: 1.29 MG/DL — SIGNIFICANT CHANGE UP (ref 0.5–1.3)
EGFR: 60 ML/MIN/1.73M2 — SIGNIFICANT CHANGE UP
EOSINOPHIL # BLD AUTO: 0.63 K/UL — HIGH (ref 0–0.5)
EOSINOPHIL NFR BLD AUTO: 7.9 % — HIGH (ref 0–6)
GLUCOSE SERPL-MCNC: 87 MG/DL — SIGNIFICANT CHANGE UP (ref 70–99)
HCT VFR BLD CALC: 43.2 % — SIGNIFICANT CHANGE UP (ref 39–50)
HGB BLD-MCNC: 14.3 G/DL — SIGNIFICANT CHANGE UP (ref 13–17)
IMM GRANULOCYTES NFR BLD AUTO: 0.4 % — SIGNIFICANT CHANGE UP (ref 0–1.5)
LYMPHOCYTES # BLD AUTO: 1.34 K/UL — SIGNIFICANT CHANGE UP (ref 1–3.3)
LYMPHOCYTES # BLD AUTO: 16.7 % — SIGNIFICANT CHANGE UP (ref 13–44)
MCHC RBC-ENTMCNC: 31.2 PG — SIGNIFICANT CHANGE UP (ref 27–34)
MCHC RBC-ENTMCNC: 33.1 G/DL — SIGNIFICANT CHANGE UP (ref 32–36)
MCV RBC AUTO: 94.1 FL — SIGNIFICANT CHANGE UP (ref 80–100)
MONOCYTES # BLD AUTO: 0.76 K/UL — SIGNIFICANT CHANGE UP (ref 0–0.9)
MONOCYTES NFR BLD AUTO: 9.5 % — SIGNIFICANT CHANGE UP (ref 2–14)
NEUTROPHILS # BLD AUTO: 5.18 K/UL — SIGNIFICANT CHANGE UP (ref 1.8–7.4)
NEUTROPHILS NFR BLD AUTO: 64.5 % — SIGNIFICANT CHANGE UP (ref 43–77)
NRBC # BLD: 0 /100 WBCS — SIGNIFICANT CHANGE UP (ref 0–0)
PLATELET # BLD AUTO: 243 K/UL — SIGNIFICANT CHANGE UP (ref 150–400)
POTASSIUM SERPL-MCNC: 5 MMOL/L — SIGNIFICANT CHANGE UP (ref 3.5–5.3)
POTASSIUM SERPL-SCNC: 5 MMOL/L — SIGNIFICANT CHANGE UP (ref 3.5–5.3)
PROT SERPL-MCNC: 7.2 G/DL — SIGNIFICANT CHANGE UP (ref 6–8.3)
RBC # BLD: 4.59 M/UL — SIGNIFICANT CHANGE UP (ref 4.2–5.8)
RBC # FLD: 12.7 % — SIGNIFICANT CHANGE UP (ref 10.3–14.5)
SODIUM SERPL-SCNC: 138 MMOL/L — SIGNIFICANT CHANGE UP (ref 135–145)
TSH SERPL-MCNC: 2.45 UIU/ML — SIGNIFICANT CHANGE UP (ref 0.27–4.2)
WBC # BLD: 8.02 K/UL — SIGNIFICANT CHANGE UP (ref 3.8–10.5)
WBC # FLD AUTO: 8.02 K/UL — SIGNIFICANT CHANGE UP (ref 3.8–10.5)

## 2022-05-13 NOTE — ED CDU PROVIDER SUBSEQUENT DAY NOTE - PHYSICAL EXAMINATION
5/13/2022    Judith Aviles MD  [address] [facility] [address]    RE: Ade Wilkins       Dear Colleague,     I had the pleasure of seeing Ade Wilkins in the [facility].  Primary Cardiologist: Dr. Lewis    Reason for Visit: Post Angiogram follow up     History of Present Illness:   Rachael is a very pleasant 73 year old female with past medical history notable for severe symptomatic aortic valve stenosis (mean gradient of 46 mmHg with valve area of 0.9 cm  and V-max of 4.4 m/s in the setting of LVEF of 65%), CAD (recent stent placement to proximal RCA for 70% lesion with positive FFR; on aspirin and Brilinta), hypertension, chronic HFpEF (on furosemide 40 mg daily; NYHA class III), lymphedema, CKD, type 2 diabetes mellitus (insulin-dependent; peripheral neuropathy), and mixed hyperlipidemia (on rosuvastatin 20 mg and ezetimibe 10 mg daily with LDL of 30).    Rachael had recent PCI to significant lesion in proximal RCA.  She is doing well on Brilinta and aspirin.  She does report mild pleuritic chest pain since her procedure but this is already improving.  She thinks the pannus strap was quite too tight during the procedure causing some discomfort.  She denies true chest pain.  She is on furosemide 40 mg daily for her history of HFpEF as well as lymphedema.  She thinks that her peripheral edema is better than usual as she has been elevating her legs more consistently.  She does urinate frequently with furosemide 40 mg daily.  She tells me she has difficulty lying flat at night so she usually sleeps in her chair.  She tells me this is how she has been sleeping for a long time.  She has both tinnitus and claustrophobia that prevents her from lying flat on her back.  She does not appear to have orthopnea as a cause of it.  She is scheduled for CT TAVR angiogram.  She will be discussed during interdisciplinary structural meeting.  She is hoping to have her valve procedure soon.  She denies  any bleeding issues on DAPT.    Assessment and Plan:  Rachael is a very pleasant 73 year old female with past medical history notable for severe symptomatic aortic valve stenosis (mean gradient of 46 mmHg with valve area of 0.9 cm  and V-max of 4.4 m/s in the setting of LVEF of 65%), CAD (recent stent placement to proximal RCA for 70% lesion with positive FFR; on aspirin and Brilinta), hypertension, chronic HFpEF (on furosemide 40 mg daily; NYHA class III), lymphedema, CKD, type 2 diabetes mellitus (insulin-dependent; peripheral neuropathy), and mixed hyperlipidemia (on rosuvastatin 20 mg and ezetimibe 10 mg daily with LDL of 30).    She is doing well from a cardiac standpoint.  Her pleuritic chest pain is improving already.  She is not hypoxic or tachycardic today.  Since its markedly better today we will continue to observe this.  If she has any persistent shortness of breath she will contact one of her TAVR nurses next week.  It could be that she may be having slight side effect from Brilinta.  She otherwise appears to be euvolemic.  She does have some peripheral edema but this is likely from her lymphedema.  She will continue with furosemide 40 mg daily.  She will hear more from her TAVR team once there is further plan on the timeline of her TAVR.    40 minutes spent on the date of the encounter with chart review, patient visit, care coordination, and documentation.      This note was completed in part using Dragon voice recognition software. Although reviewed after completion, some word and grammatical errors may occur.    Orders this Visit:  No orders of the defined types were placed in this encounter.    No orders of the defined types were placed in this encounter.    There are no discontinued medications.      Encounter Diagnosis   Name Primary?     Severe aortic stenosis        CURRENT MEDICATIONS:  Current Outpatient Medications   Medication Sig Dispense Refill     aspirin (ASA) 81 MG chewable tablet Take 1  Gen: AAOx3, non-toxic  	Head: NCAT  	HEENT: EOMI, oral mucosa moist, normal conjunctiva  	Lung: CTAB, no respiratory distress, no wheezes/rhonchi/rales B/L, speaking in full sentences  	CV: RRR, no murmurs, rubs or gallops  	Abd: soft, NTND, no guarding, no CVA tenderness  	MSK: no visible deformities, moving all extremities freely   	Neuro: No focal sensory or motor deficits  Psych: normal affect. tablet (81 mg) by mouth daily Starting tomorrow. 30 tablet 3     calcium carbonate 600 mg-vitamin D 400 units (CALTRATE) 600-400 MG-UNIT per tablet Take 1 tablet by mouth in the morning and 1 tablet in the evening.       cetirizine (ZYRTEC) 10 MG tablet Take 1 tablet (10 mg) by mouth daily       Cholecalciferol (VITAMIN D) 2000 UNITS tablet Take 2,000 Units by mouth daily       cyanocolbalamin (VITAMIN B-12) 1000 MCG tablet Take 1,000 mcg by mouth in the morning.       Dulaglutide 3 MG/0.5ML SOPN Inject 3 mg Subcutaneous once a week 6 mL 3     empagliflozin (JARDIANCE) 10 MG TABS tablet Take 1 tablet (10 mg) by mouth daily 30 tablet 5     ezetimibe (ZETIA) 10 MG tablet Take 1 tablet (10 mg) by mouth daily 90 tablet 3     fluticasone (FLONASE) 50 MCG/ACT nasal spray SHAKE LIQUID AND USE 2 SPRAYS IN EACH NOSTRIL DAILY 48 g 2     furosemide (LASIX) 40 MG tablet TAKE ONE TABLET BY MOUTH ONE TIME DAILY 90 tablet 0     HUMALOG KWIKPEN 100 UNIT/ML soln INJECT  Per sliding scale UP TO 60 UNITS UNDER THE SKIN DAILY. 60 mL 3     insulin degludec (TRESIBA) 200 UNIT/ML pen Inject 44  units subcu daily 40 mL 3     irbesartan (AVAPRO) 150 MG tablet Take 1 tablet (150 mg) by mouth daily (Patient taking differently: Take 75 mg by mouth daily) 90 tablet 3     levothyroxine (SYNTHROID/LEVOTHROID) 50 MCG tablet Take 1 tablet (50 mcg) by mouth daily 90 tablet 3     metFORMIN (GLUCOPHAGE) 1000 MG tablet Take 1 tablet (1,000 mg) by mouth daily (with dinner) 180 tablet      potassium chloride ER (KLOR-CON M) 10 MEQ CR tablet Take 1 tablet (10 mEq) by mouth 2 times daily 360 tablet 3     rosuvastatin (CRESTOR) 20 MG tablet Take 1 tablet (20 mg) by mouth daily 90 tablet 1     spironolactone (ALDACTONE) 25 MG tablet Take 0.5 tablets (12.5 mg) by mouth in the morning. 90 tablet 3     ticagrelor (BRILINTA) 90 MG tablet Take 1 tablet (90 mg) by mouth 2 times daily Start this evening. 180 tablet 3     topiramate (TOPAMAX) 25 MG tablet Take 3  tablets (75 mg) by mouth At Bedtime 270 tablet 1     traZODone (DESYREL) 50 MG tablet take 1 to 2 tablets (50 to 100mg) by mouth nightly as needed 180 tablet PRN     triamcinolone (KENALOG) 0.1 % external ointment Apply topically 2 times daily To left foot rash 30 g 2     verapamil ER (CALAN-SR) 180 MG CR tablet Take 1 tablet (180 mg) by mouth in the morning. 180 tablet 0     VITRON-C  MG TABS tablet TAKE TWO TABLETS BY MOUTH TWICE DAILY  360 tablet 1     augmented betamethasone dipropionate (DIPROLENE-AF) 0.05 % external ointment Apply to AA BID x 3-4 weeks then PRN 45 g 1     Continuous Blood Gluc  (FREESTYLE BALWINDER 14 DAY READER) HENNA 1 each 3 times daily 1 each 1     Continuous Blood Gluc Sensor (FREESTYLE BALWINDER 14 DAY SENSOR) MISC 1 each every 14 days 2 each 11     insulin pen needle (BD FCO U/F) 32G X 4 MM miscellaneous Use 4 pen needles daily or as directed. 400 each 0       ALLERGIES     Allergies   Allergen Reactions     Norvasc [Amlodipine] Other (See Comments)     hairloss     Clonidine Headache     Doxazosin Mesylate Rash     Fexofenadine Hydrochloride Headache     Gemfibrozil Rash     Lisinopril Angioedema     Pioglitazone Hydrochloride Swelling     ankle edema       PAST MEDICAL HISTORY:  Past Medical History:   Diagnosis Date     Benign essential hypertension      Chronic kidney disease, stage 3b (H)      Diabetic retinopathy of both eyes (H)      Obesity (BMI 30-39.9)      Osteopenia      Pure hypercholesterolemia      Type 2 diabetes mellitus (H)      Type 2 diabetes mellitus with diabetic nephropathy (H)      Type 2 diabetes mellitus with diabetic neuropathy (H)        PAST SURGICAL HISTORY:  Past Surgical History:   Procedure Laterality Date     APPENDECTOMY       CATARACT IOL, RT/LT Bilateral      CV CORONARY ANGIOGRAM N/A 5/4/2022    Procedure: Coronary Angiogram;  Surgeon: Hector Lewis MD;  Location:  HEART CARDIAC CATH LAB     CV LEFT HEART CATH N/A 5/4/2022     Procedure: Left Heart Catheterization;  Surgeon: Hector Lewis MD;  Location:  HEART CARDIAC CATH LAB     CV PCI N/A 5/4/2022    Procedure: Percutaneous Coronary Intervention;  Surgeon: Hector Lewis MD;  Location: [facility] CARDIAC CATH LAB     GASTRIC BYPASS  2004     TONSILLECTOMY & ADENOIDECTOMY         FAMILY HISTORY:  Family History   Problem Relation Age of Onset     Diabetes Type 2  Mother      Glaucoma Mother      Coronary Artery Disease Mother      Abdominal Aortic Aneurysm Father      Myocardial Infarction Father      Breast Cancer Sister      Abdominal Aortic Aneurysm Brother      Bladder Cancer Brother      Diabetes Type 2  Brother      Liver Cancer Brother      Myocardial Infarction Brother      Alzheimer Disease Paternal Grandmother      Cerebrovascular Disease Paternal Grandfather      Ovarian Cancer No family hx of      Colon Cancer No family hx of        SOCIAL HISTORY:  Social History     Socioeconomic History     Marital status:      Spouse name: None     Number of children: 0     Years of education: None     Highest education level: None   Occupational History     Occupation: Retired -    Tobacco Use     Smoking status: Never Smoker     Smokeless tobacco: Never Used   Substance and Sexual Activity     Alcohol use: No     Drug use: No     Sexual activity: Not Currently   Other Topics Concern     Caffeine Concern Yes     Comment: 20 oz daily     Exercise No     Seat Belt Yes     Self-Exams Yes   Social History Narrative    . Single.    No kids.    No formal exercise.        Review of Systems:  Skin:  Negative     Eyes:  Negative    ENT:  Negative    Respiratory:  Positive for shortness of breath;cough;dyspnea on exertion  Cardiovascular:    Positive for;chest pain;palpitations;dizziness;lightheadedness;edema  Gastroenterology: Negative    Genitourinary:  Negative    Musculoskeletal:  Negative    Neurologic:  Negative    Psychiatric:  Negative   "  Heme/Lymph/Imm:  Positive for allergies  Endocrine:  Positive for thyroid disorder;diabetes    Physical Exam:  Vitals: /72   Pulse 62   Ht 1.588 m (5' 2.5\")   Wt 91.3 kg (201 lb 4.8 oz)   LMP  (LMP Unknown)   BMI 36.23 kg/m       GEN:  NAD  NECK: Unable to assess JVP.  C/V:  Regular rate and rhythm; 3/6 LYNDON at RUSB.  RESP: Clear to auscultation bilaterally without wheezing, rales, or rhonchi.  GI: Abdomen soft, nontender, nondistended.   EXTREM: 2+ pitting LE edema just below her knees.   NEURO: Alert and oriented, cooperative. No obvious focal deficits.   PSYCH: Normal affect.  SKIN: Warm and dry.       Recent Lab Results:  LIPID RESULTS:  Lab Results   Component Value Date    CHOL 83 05/04/2022    CHOL 115 03/27/2020    HDL 41 (L) 05/04/2022    HDL 34 (L) 03/27/2020    LDL 30 05/04/2022    LDL 59 03/27/2020    TRIG 62 05/04/2022    TRIG 112 03/27/2020    CHOLHDLRATIO 5.5 (H) 05/22/2015       LIVER ENZYME RESULTS:  Lab Results   Component Value Date    AST 49 (H) 04/21/2022    AST 20 04/13/2021    ALT 94 (H) 04/21/2022    ALT 38 04/13/2021       CBC RESULTS:  Lab Results   Component Value Date    WBC 9.3 05/04/2022    WBC 8.3 01/08/2021    RBC 3.97 05/04/2022    RBC 4.12 01/08/2021    HGB 11.1 (L) 05/04/2022    HGB 11.6 (L) 01/08/2021    HCT 35.5 05/04/2022    HCT 38.2 01/08/2021    MCV 89 05/04/2022    MCV 93 01/08/2021    MCH 28.0 05/04/2022    MCH 28.2 01/08/2021    MCHC 31.3 (L) 05/04/2022    MCHC 30.4 (L) 01/08/2021    RDW 13.4 05/04/2022    RDW 13.5 01/08/2021     05/04/2022     01/08/2021       BMP RESULTS:  Lab Results   Component Value Date     05/13/2022     04/13/2021    POTASSIUM 4.4 05/13/2022    POTASSIUM 4.2 04/13/2021    CHLORIDE 108 05/13/2022    CHLORIDE 110 (H) 04/13/2021    CO2 24 05/13/2022    CO2 28 04/13/2021    ANIONGAP 7 05/13/2022    ANIONGAP 3 04/13/2021     (H) 05/13/2022     (H) 04/13/2021    BUN 29 05/13/2022    BUN 29 04/13/2021    " CR 1.44 (H) 05/13/2022    CR 1.38 (H) 04/13/2021    GFRESTIMATED 39 (L) 05/13/2022    GFRESTIMATED 38 (L) 04/13/2021    GFRESTBLACK 45 (L) 04/13/2021    RASHEEDA 9.3 05/13/2022    RASHEEDA 9.2 04/13/2021        A1C RESULTS:  Lab Results   Component Value Date    A1C 7.9 (H) 05/04/2022    A1C 8.1 (H) 01/08/2021       INR RESULTS:  Lab Results   Component Value Date    INR 0.97 05/04/2022           Latasha Calderon PA-C  May 13, 2022       Thank you for allowing me to participate in the care of your patient.      Sincerely,     Latasha Calderon PA-C     [facility]  cc:   Hector Lewis MD  [address]  [place]  [place] [address]

## 2022-05-16 DIAGNOSIS — Z51.11 ENCOUNTER FOR ANTINEOPLASTIC CHEMOTHERAPY: ICD-10-CM

## 2022-05-16 DIAGNOSIS — R11.2 NAUSEA WITH VOMITING, UNSPECIFIED: ICD-10-CM

## 2022-05-20 ENCOUNTER — RX RENEWAL (OUTPATIENT)
Age: 72
End: 2022-05-20

## 2022-06-02 ENCOUNTER — OUTPATIENT (OUTPATIENT)
Dept: OUTPATIENT SERVICES | Facility: HOSPITAL | Age: 72
LOS: 1 days | Discharge: HOME | End: 2022-06-02

## 2022-06-02 DIAGNOSIS — Z98.890 OTHER SPECIFIED POSTPROCEDURAL STATES: Chronic | ICD-10-CM

## 2022-06-02 DIAGNOSIS — C34.11 MALIGNANT NEOPLASM OF UPPER LOBE, RIGHT BRONCHUS OR LUNG: ICD-10-CM

## 2022-06-03 ENCOUNTER — APPOINTMENT (OUTPATIENT)
Dept: INFUSION THERAPY | Facility: HOSPITAL | Age: 72
End: 2022-06-03

## 2022-06-03 ENCOUNTER — RESULT REVIEW (OUTPATIENT)
Age: 72
End: 2022-06-03

## 2022-06-03 ENCOUNTER — NON-APPOINTMENT (OUTPATIENT)
Age: 72
End: 2022-06-03

## 2022-06-03 LAB
BASOPHILS # BLD AUTO: 0.09 K/UL — SIGNIFICANT CHANGE UP (ref 0–0.2)
BASOPHILS NFR BLD AUTO: 1.6 % — SIGNIFICANT CHANGE UP (ref 0–2)
EOSINOPHIL # BLD AUTO: 0.56 K/UL — HIGH (ref 0–0.5)
EOSINOPHIL NFR BLD AUTO: 9.8 % — HIGH (ref 0–6)
HCT VFR BLD CALC: 46.6 % — SIGNIFICANT CHANGE UP (ref 39–50)
HGB BLD-MCNC: 15.1 G/DL — SIGNIFICANT CHANGE UP (ref 13–17)
IMM GRANULOCYTES NFR BLD AUTO: 0.3 % — SIGNIFICANT CHANGE UP (ref 0–1.5)
LYMPHOCYTES # BLD AUTO: 1.55 K/UL — SIGNIFICANT CHANGE UP (ref 1–3.3)
LYMPHOCYTES # BLD AUTO: 27 % — SIGNIFICANT CHANGE UP (ref 13–44)
MCHC RBC-ENTMCNC: 30.5 PG — SIGNIFICANT CHANGE UP (ref 27–34)
MCHC RBC-ENTMCNC: 32.4 G/DL — SIGNIFICANT CHANGE UP (ref 32–36)
MCV RBC AUTO: 94.1 FL — SIGNIFICANT CHANGE UP (ref 80–100)
MONOCYTES # BLD AUTO: 0.41 K/UL — SIGNIFICANT CHANGE UP (ref 0–0.9)
MONOCYTES NFR BLD AUTO: 7.1 % — SIGNIFICANT CHANGE UP (ref 2–14)
NEUTROPHILS # BLD AUTO: 3.11 K/UL — SIGNIFICANT CHANGE UP (ref 1.8–7.4)
NEUTROPHILS NFR BLD AUTO: 54.2 % — SIGNIFICANT CHANGE UP (ref 43–77)
NRBC # BLD: 0 /100 WBCS — SIGNIFICANT CHANGE UP (ref 0–0)
PLATELET # BLD AUTO: 326 K/UL — SIGNIFICANT CHANGE UP (ref 150–400)
RBC # BLD: 4.95 M/UL — SIGNIFICANT CHANGE UP (ref 4.2–5.8)
RBC # FLD: 12.8 % — SIGNIFICANT CHANGE UP (ref 10.3–14.5)
WBC # BLD: 5.74 K/UL — SIGNIFICANT CHANGE UP (ref 3.8–10.5)
WBC # FLD AUTO: 5.74 K/UL — SIGNIFICANT CHANGE UP (ref 3.8–10.5)

## 2022-06-06 DIAGNOSIS — R11.2 NAUSEA WITH VOMITING, UNSPECIFIED: ICD-10-CM

## 2022-06-06 DIAGNOSIS — Z51.11 ENCOUNTER FOR ANTINEOPLASTIC CHEMOTHERAPY: ICD-10-CM

## 2022-06-24 ENCOUNTER — RESULT REVIEW (OUTPATIENT)
Age: 72
End: 2022-06-24

## 2022-06-24 ENCOUNTER — APPOINTMENT (OUTPATIENT)
Dept: INFUSION THERAPY | Facility: HOSPITAL | Age: 72
End: 2022-06-24

## 2022-06-24 LAB
ALBUMIN SERPL ELPH-MCNC: 4.5 G/DL — SIGNIFICANT CHANGE UP (ref 3.3–5)
ALP SERPL-CCNC: 106 U/L — SIGNIFICANT CHANGE UP (ref 40–120)
ALT FLD-CCNC: 18 U/L — SIGNIFICANT CHANGE UP (ref 10–45)
ANION GAP SERPL CALC-SCNC: 12 MMOL/L — SIGNIFICANT CHANGE UP (ref 5–17)
AST SERPL-CCNC: 41 U/L — HIGH (ref 10–40)
BASOPHILS # BLD AUTO: 0.08 K/UL — SIGNIFICANT CHANGE UP (ref 0–0.2)
BASOPHILS NFR BLD AUTO: 1.3 % — SIGNIFICANT CHANGE UP (ref 0–2)
BILIRUB SERPL-MCNC: 0.4 MG/DL — SIGNIFICANT CHANGE UP (ref 0.2–1.2)
BUN SERPL-MCNC: 19 MG/DL — SIGNIFICANT CHANGE UP (ref 7–23)
CALCIUM SERPL-MCNC: 9.5 MG/DL — SIGNIFICANT CHANGE UP (ref 8.4–10.5)
CHLORIDE SERPL-SCNC: 102 MMOL/L — SIGNIFICANT CHANGE UP (ref 96–108)
CO2 SERPL-SCNC: 23 MMOL/L — SIGNIFICANT CHANGE UP (ref 22–31)
CREAT SERPL-MCNC: 1.03 MG/DL — SIGNIFICANT CHANGE UP (ref 0.5–1.3)
EGFR: 78 ML/MIN/1.73M2 — SIGNIFICANT CHANGE UP
EOSINOPHIL # BLD AUTO: 0.55 K/UL — HIGH (ref 0–0.5)
EOSINOPHIL NFR BLD AUTO: 8.6 % — HIGH (ref 0–6)
GLUCOSE SERPL-MCNC: 90 MG/DL — SIGNIFICANT CHANGE UP (ref 70–99)
HCT VFR BLD CALC: 43.2 % — SIGNIFICANT CHANGE UP (ref 39–50)
HGB BLD-MCNC: 14.4 G/DL — SIGNIFICANT CHANGE UP (ref 13–17)
IMM GRANULOCYTES NFR BLD AUTO: 0.3 % — SIGNIFICANT CHANGE UP (ref 0–1.5)
LYMPHOCYTES # BLD AUTO: 1.65 K/UL — SIGNIFICANT CHANGE UP (ref 1–3.3)
LYMPHOCYTES # BLD AUTO: 25.9 % — SIGNIFICANT CHANGE UP (ref 13–44)
MCHC RBC-ENTMCNC: 31.6 PG — SIGNIFICANT CHANGE UP (ref 27–34)
MCHC RBC-ENTMCNC: 33.3 G/DL — SIGNIFICANT CHANGE UP (ref 32–36)
MCV RBC AUTO: 94.7 FL — SIGNIFICANT CHANGE UP (ref 80–100)
MONOCYTES # BLD AUTO: 0.48 K/UL — SIGNIFICANT CHANGE UP (ref 0–0.9)
MONOCYTES NFR BLD AUTO: 7.5 % — SIGNIFICANT CHANGE UP (ref 2–14)
NEUTROPHILS # BLD AUTO: 3.58 K/UL — SIGNIFICANT CHANGE UP (ref 1.8–7.4)
NEUTROPHILS NFR BLD AUTO: 56.4 % — SIGNIFICANT CHANGE UP (ref 43–77)
NRBC # BLD: 0 /100 WBCS — SIGNIFICANT CHANGE UP (ref 0–0)
PLATELET # BLD AUTO: 268 K/UL — SIGNIFICANT CHANGE UP (ref 150–400)
POTASSIUM SERPL-MCNC: 5.9 MMOL/L — HIGH (ref 3.5–5.3)
POTASSIUM SERPL-SCNC: 5.9 MMOL/L — HIGH (ref 3.5–5.3)
PROT SERPL-MCNC: 7.6 G/DL — SIGNIFICANT CHANGE UP (ref 6–8.3)
RBC # BLD: 4.56 M/UL — SIGNIFICANT CHANGE UP (ref 4.2–5.8)
RBC # FLD: 13 % — SIGNIFICANT CHANGE UP (ref 10.3–14.5)
SODIUM SERPL-SCNC: 137 MMOL/L — SIGNIFICANT CHANGE UP (ref 135–145)
TSH SERPL-MCNC: 2.75 UIU/ML — SIGNIFICANT CHANGE UP (ref 0.27–4.2)
WBC # BLD: 6.36 K/UL — SIGNIFICANT CHANGE UP (ref 3.8–10.5)
WBC # FLD AUTO: 6.36 K/UL — SIGNIFICANT CHANGE UP (ref 3.8–10.5)

## 2022-07-11 ENCOUNTER — APPOINTMENT (OUTPATIENT)
Dept: CARDIOLOGY | Facility: CLINIC | Age: 72
End: 2022-07-11

## 2022-07-11 VITALS
HEIGHT: 70 IN | HEART RATE: 79 BPM | WEIGHT: 125.64 LBS | DIASTOLIC BLOOD PRESSURE: 83 MMHG | TEMPERATURE: 98.1 F | SYSTOLIC BLOOD PRESSURE: 127 MMHG | BODY MASS INDEX: 17.99 KG/M2 | OXYGEN SATURATION: 99 % | RESPIRATION RATE: 16 BRPM

## 2022-07-11 DIAGNOSIS — I26.99 OTHER PULMONARY EMBOLISM W/OUT ACUTE COR PULMONALE: ICD-10-CM

## 2022-07-11 PROCEDURE — 93000 ELECTROCARDIOGRAM COMPLETE: CPT

## 2022-07-11 PROCEDURE — 93010 ELECTROCARDIOGRAM REPORT: CPT

## 2022-07-11 PROCEDURE — 99215 OFFICE O/P EST HI 40 MIN: CPT

## 2022-07-11 RX ORDER — AMOXICILLIN 500 MG/1
500 CAPSULE ORAL
Qty: 28 | Refills: 0 | Status: DISCONTINUED | COMMUNITY
Start: 2022-06-13

## 2022-07-11 NOTE — ASSESSMENT
[FreeTextEntry1] : 72 year old man with lung adenocarcinoma exhibiting excellent response to PD-1 inhibitor immunotherapy, HTN, atherosclerotic plaque, and history of pulmonary embolism.\par \par We will repeat echocardiogram now because of dyspnea on exertion and mild LV dysfunction noted on the prior study which was technically difficult.\par \par Continue rosuvastatin for atherosclerotic plaque and immunotherapy.\par Can DC apixaban now and change to aspirin 81 mg daily\par \par Will repeat BNP, lipid panel today.\par \par Follow up in 3 months with me.\par \par Above recommendations discussed and all questions were answered to the best of my ability and to his apparent satisfaction.\par \par Total time spent on today's encounter was 45 minutes. >50% time spent in counseling and coordination of care and on addressing above medical conditions in assessment.\par \par  \par \par

## 2022-07-11 NOTE — PHYSICAL EXAM
[Well Developed] : well developed [No Acute Distress] : no acute distress [Normal Conjunctiva] : normal conjunctiva [No Xanthelasma] : no xanthelasma [Normal Venous Pressure] : normal venous pressure [No Carotid Bruit] : no carotid bruit [Normal S1, S2] : normal S1, S2 [No Murmur] : no murmur [No Rub] : no rub [No Gallop] : no gallop [Clear Lung Fields] : clear lung fields [Good Air Entry] : good air entry [No Respiratory Distress] : no respiratory distress  [Soft] : abdomen soft [Normal Gait] : normal gait [Gait - Sufficient for Exercise Testing] : gait - sufficient for exercise testing [No Edema] : no edema [No Cyanosis] : no cyanosis [No Varicosities] : no varicosities [No Rash] : no rash [Moves all extremities] : moves all extremities [No Focal Deficits] : no focal deficits [Normal Speech] : normal speech [Alert and Oriented] : alert and oriented

## 2022-07-11 NOTE — REASON FOR VISIT
[FreeTextEntry3] : Dr. Bell [FreeTextEntry1] : 72 year old man with WTC exposure (), smoker, hypertension, metastatic lung adenoCA s/p carbo/pemetrexed and pembrolizumab (11/2020-present), pulmonary embolism (Jan 2021), and coronary artery calcifications by CT who is seen for follow up.

## 2022-07-11 NOTE — HISTORY OF PRESENT ILLNESS
[FreeTextEntry1] : Interval History: Feels great. Tells me he stopped smoking. Continue to experience dyspnea with exertion, relieved with nebulizers. Symptoms are stable. Has been taking apixaban only once a day.\par \par \par \par History:\par At oncology visit 1/14 reported chest discomfort and labile blood pressure with hypertension at home, noted to have new TWI on his ECG and sent to ER for workup. In ER Hs Annika was 27-25-21 ng/L (negative), BNP 5716 (elevated). ECG RBBB, chronic. CT-Angiogram showed new segmental PE, started on anticoagulation with enoxaparin and transitioned to apixaban.\par \par Also noted on the CT were coronary artery calcification and signs of colitis. He was prescribed a course of cipro/flagyl for this by the ER, which he feels did nothing. He denies any chest pain or new shortness of breath. He denies any dizziness, palpitations, syncope, or near syncope. He had mild esophagitis from PD-1 inhibitor initially, which resolved.\par \par He denies any history of heart disease but has right bundle branch block on the ECG, which is chronic. Work as a  causes him moderate stress, and he smoked until cancer diagnosis.\par \par He tells me he takes amlodipine for HTN, but is no longer taking HCTZ or losartan. No recent stress test.\par \par Compliant with apixaban, notes some ecchymoses.\par \par May 2021:\par \par He feels well and denies any new cardiac symptoms or complaints. He is taking medications as directed without any notable adverse effects. Continues on immunotherapy with PD-1 inhibitor, every 3 weeks, tolerating well. He smoked 1 or 2 cigarettes in the past month and has some second hand smoke exposure.\par \par No new chest pain, palps, dizziness/lightheadedness.\par \par Sept 10 2021\par Feels well. Continues on pembrolizumab plan for total 24 months. Good disease response to date. Still smoking, but less than 5 per week. No  chest pain, no palps, no change in ET. No bleeding. Dyspnea on exertion is stable. \par \par Cardiovascular Testing:\par ------------------------------------------------\par ECG:\par 7/11/2022:sinus w RBBB, no change from prior\par 1/14/2022: sinus with RBBB, unchanged from prior\par 9/10/2021: sinus rhythm 73 bpm, RBBB. Unchanged from prior\par 5/10/2021: sinus rhythm at 86 bpm with RBBB, unchanged from prior \par --------------------------------------------------\par Echo:\par 2/08/2021: technically difficult, MAC, mild MR. Mild LV dysfunction. No pericardial effusion \par ---------------------------------------------------\par CT\par July : CT chest, abdomen with IV contrast: no aortic aneurysm and moderate proximal SMA stenosis \par

## 2022-07-15 ENCOUNTER — NON-APPOINTMENT (OUTPATIENT)
Age: 72
End: 2022-07-15

## 2022-07-15 ENCOUNTER — RESULT REVIEW (OUTPATIENT)
Age: 72
End: 2022-07-15

## 2022-07-15 ENCOUNTER — APPOINTMENT (OUTPATIENT)
Dept: INFUSION THERAPY | Facility: HOSPITAL | Age: 72
End: 2022-07-15

## 2022-07-15 ENCOUNTER — APPOINTMENT (OUTPATIENT)
Dept: HEMATOLOGY ONCOLOGY | Facility: CLINIC | Age: 72
End: 2022-07-15

## 2022-07-15 LAB
ALBUMIN SERPL ELPH-MCNC: 4.2 G/DL — SIGNIFICANT CHANGE UP (ref 3.3–5)
ALP SERPL-CCNC: 98 U/L — SIGNIFICANT CHANGE UP (ref 40–120)
ALT FLD-CCNC: 15 U/L — SIGNIFICANT CHANGE UP (ref 10–45)
ANION GAP SERPL CALC-SCNC: 8 MMOL/L — SIGNIFICANT CHANGE UP (ref 5–17)
AST SERPL-CCNC: 42 U/L — HIGH (ref 10–40)
BASOPHILS # BLD AUTO: 0.07 K/UL — SIGNIFICANT CHANGE UP (ref 0–0.2)
BASOPHILS NFR BLD AUTO: 1.3 % — SIGNIFICANT CHANGE UP (ref 0–2)
BILIRUB SERPL-MCNC: 0.4 MG/DL — SIGNIFICANT CHANGE UP (ref 0.2–1.2)
BUN SERPL-MCNC: 18 MG/DL — SIGNIFICANT CHANGE UP (ref 7–23)
CALCIUM SERPL-MCNC: 9.5 MG/DL — SIGNIFICANT CHANGE UP (ref 8.4–10.5)
CHLORIDE SERPL-SCNC: 104 MMOL/L — SIGNIFICANT CHANGE UP (ref 96–108)
CO2 SERPL-SCNC: 26 MMOL/L — SIGNIFICANT CHANGE UP (ref 22–31)
CREAT SERPL-MCNC: 1.1 MG/DL — SIGNIFICANT CHANGE UP (ref 0.5–1.3)
EGFR: 72 ML/MIN/1.73M2 — SIGNIFICANT CHANGE UP
EOSINOPHIL # BLD AUTO: 0.42 K/UL — SIGNIFICANT CHANGE UP (ref 0–0.5)
EOSINOPHIL NFR BLD AUTO: 7.6 % — HIGH (ref 0–6)
GLUCOSE SERPL-MCNC: 92 MG/DL — SIGNIFICANT CHANGE UP (ref 70–99)
HCT VFR BLD CALC: 44 % — SIGNIFICANT CHANGE UP (ref 39–50)
HGB BLD-MCNC: 14.5 G/DL — SIGNIFICANT CHANGE UP (ref 13–17)
IMM GRANULOCYTES NFR BLD AUTO: 0.4 % — SIGNIFICANT CHANGE UP (ref 0–1.5)
LYMPHOCYTES # BLD AUTO: 1.34 K/UL — SIGNIFICANT CHANGE UP (ref 1–3.3)
LYMPHOCYTES # BLD AUTO: 24.1 % — SIGNIFICANT CHANGE UP (ref 13–44)
MCHC RBC-ENTMCNC: 31.5 PG — SIGNIFICANT CHANGE UP (ref 27–34)
MCHC RBC-ENTMCNC: 33 G/DL — SIGNIFICANT CHANGE UP (ref 32–36)
MCV RBC AUTO: 95.4 FL — SIGNIFICANT CHANGE UP (ref 80–100)
MONOCYTES # BLD AUTO: 0.49 K/UL — SIGNIFICANT CHANGE UP (ref 0–0.9)
MONOCYTES NFR BLD AUTO: 8.8 % — SIGNIFICANT CHANGE UP (ref 2–14)
NEUTROPHILS # BLD AUTO: 3.21 K/UL — SIGNIFICANT CHANGE UP (ref 1.8–7.4)
NEUTROPHILS NFR BLD AUTO: 57.8 % — SIGNIFICANT CHANGE UP (ref 43–77)
NRBC # BLD: 0 /100 WBCS — SIGNIFICANT CHANGE UP (ref 0–0)
PLATELET # BLD AUTO: 227 K/UL — SIGNIFICANT CHANGE UP (ref 150–400)
POTASSIUM SERPL-MCNC: 5.9 MMOL/L — HIGH (ref 3.5–5.3)
POTASSIUM SERPL-SCNC: 5.9 MMOL/L — HIGH (ref 3.5–5.3)
PROT SERPL-MCNC: 7.6 G/DL — SIGNIFICANT CHANGE UP (ref 6–8.3)
RBC # BLD: 4.61 M/UL — SIGNIFICANT CHANGE UP (ref 4.2–5.8)
RBC # FLD: 13 % — SIGNIFICANT CHANGE UP (ref 10.3–14.5)
SODIUM SERPL-SCNC: 138 MMOL/L — SIGNIFICANT CHANGE UP (ref 135–145)
TSH SERPL-MCNC: 1.92 UIU/ML — SIGNIFICANT CHANGE UP (ref 0.27–4.2)
WBC # BLD: 5.55 K/UL — SIGNIFICANT CHANGE UP (ref 3.8–10.5)
WBC # FLD AUTO: 5.55 K/UL — SIGNIFICANT CHANGE UP (ref 3.8–10.5)

## 2022-07-15 PROCEDURE — 99214 OFFICE O/P EST MOD 30 MIN: CPT

## 2022-07-15 NOTE — ASSESSMENT
[Palliative] : Goals of care discussed with patient: Palliative [FreeTextEntry1] : 73 yo with newly diagnosed NSCLC stage IV . PDL1 was 80% but tumor NGS showed high TMB. Blood NGS through [facility] revealed- HRAS and TP53 mutations.\par  \par Pt started with carbo/alimta/pembro as front line option on 11/6/2020. Completed 4 cycles wo irAEs. \par Now on Keytruda monotherapy as maintenance. He has been tolerating well no irAEs.  Denies diarrhea, rash and SOB, cough.\par Will continue on Keytruda q3w regimen\par PET/CT 12/13/21: and April 2022 reviewed in detail personally- excellent near-CR\par Scans reviewed independently and discussed findings with pt. \par Labs reviewed in detail and discussed with pt.\par Follow up with PCP and pulm\par WIll see pt in 6 weeks\par We also discussed the importance of close monitoring with labs, toxicity and efficacy assessment, while on this high risk medication (Keytruda). Pt verbalized understanding. \par Pt reiterated importance of regular follow up\par \par \par \par

## 2022-07-15 NOTE — REVIEW OF SYSTEMS
[Recent Change In Weight] : ~T recent weight change [Negative] : Heme/Lymph [Fatigue] : no fatigue [Chest Pain] : no chest pain [SOB on Exertion] : no shortness of breath during exertion [FreeTextEntry2] : lost some weight

## 2022-07-15 NOTE — HISTORY OF PRESENT ILLNESS
[Disease: _____________________] : Disease: [unfilled] [M: ___] : M[unfilled] [AJCC Stage: ____] : AJCC Stage: [unfilled] [de-identified] : 71 yo M with tobacco smoking hx and 911 exposure ( being followed [facility],  was diagnosed with COPD/emphysema (dx soon after 9/11), had screening CT in 2019 which showed a lung mass. Work up at [facility]- solitary mass which was resected (benign-organizing PNA). He was referred to pulm grant a year ago by [facility] program but he made an appointment this year with Dr. Pritchard. He referred him for screening CT in Feb which was done in August and showed right upper lobe 3.7 x 3.8 cm mass with interval increase in size since January 7, 2019 worrisome for neoplasm possibly a primary lung neoplasm. Multiple bilateral smaller nodular opacities are new since January 7, 2019 may represent metastatic disease. Enlarged anterior mediastinal lymph node worrisome for metastatic disease. PET/CT 8/25/2020 IMPRESSION: Since outside PET/CT 1/30/2019: Considerable interval increased size and FDG avidity of a now spiculated right upper lobe mass with central necrosis suspicious for primary lung malignancy. New FDG avid enlarged mediastinal lymph nodes and bilateral pulmonary nodules probably metastatic disease. Indeterminate FDG avid left intraparotid focus, not well evaluated secondary to misregistration artifact, however appears new since prior PET/CT, possibly metastatic. He underwent CT guided bx by Dr. Barraza at [facility] which was suggestive of malignancy. He subsequently underwent bronch and EBUS/bx of level 4 LN which was c/w adeno ca, lung primary. He is still doing well from a symptomatic standpoint. He has lost some weight which he usually does every Summer. He denies fevers, chills, dyspnea, chest pain/tightness, cough, wheeze. Continues to work on a full-time basis as a . \par 10/27/20: pt has no new complaints. He had Ct scans and MRi of brain interval enlargement of multiple bilateral pulmonary masses and mediastinal adenopathy consistent with progressive disease. CT abdomen and MRI brain revealed no mets. \par \par 11/6/2020: Pt presents prior to starting treatment today to review regimen, address any concerns and provide written consent. He offers no new complaints. \par \par 11/27/2020: Pt returns for follow up. S/p first cycle. Tolerating well . NOT SMOKING!!!!! Doing well. Had one episode of near syncope (vaso-vagal). Pt states he was very dehydrated. No further episodes since. Pt states he "feels great"\par \par 12/18/2020: Pt returns for follow up. Tolerating treatment wo any AE. Only reports mild occasional fatigue. Scheduled for C#3 today\par \par 1/8/2021: Pt returns for follow up and discussion of findings on recent imaging. He is feeling well. Appetite good. Occasional fatigue. States he is losing his hair and is very unhappy about it. \par \par 1/14/21: Patient came to clinic for follow up today, he is s/p C4 Carbo/Alimta, Keytruda on 1/8/21, he reports worsening fatigue, poor appetite but stable weight, also reports  for past 2-3 days and  today, reports burning sensation feeling in chest that radiates to his throat and  nausea. Denies sob and skin rash. He did not take any BP meds today as his BP was in 100s He also experiences constipation. ECG was done in the office and showed some flip T waves in anterior leads. Plan to send him to [facility] to evaluate.\par \par 1/29/21: Pt had a prolonged recovery period after last treatment. Nausea, dysphagia, weight loss, and chest pain. As noted above, pt was hospitalized. No acute etiology was found and he was discharged home., He feels better now. Started eating again. CT scan in the hospital on 1/14 revealed  \par 1. Small right upper lobe pulmonary embolism. No evidence of pulmonary infarct or right heart strain.\par 2. Right upper lobe pulmonary mass and bilateral pulmonary nodules are unchanged from 01/05/2021.\par 3. There is extensive wall thickening in the proximal duodenum and within left upper quadrant small bowel loops, compatible with enteritis. Upper abdominal free fluid seen between the duodenum and pancreas and within the left upper quadrant, inferior to the pancreas, may be related to enteritis. Superimposed pancreatitis should be excluded clinically.\par 4. Free fluid in the right perinephric space, posterior-medial to the right kidney is new from 01/05/2021. Underlying genitourinary infection/pyelonephritis should be excluded clinically.\par \par He has lost a lot of weight since last visit. \par \par 2/19/21: Pt reports feeling much better. No ae from last treatment with monotherapy Keytruda. Pt has been seeing GI and cologard colon cancer assay was reported as positive and patient has been scheduled for colonoscopy on 3/19/21. No new complaints. \par \par 4/2/21: Pt returns for follow up. He is feeling well. States he had colonoscopy and endoscopy last week and "everything was normal." Tolerating treatment well. Offers no complaints. \par \par 4/23/21: Pt returns for follow up and discussion of findings on recent imaging. Offers no complaints. Feeling well. Scheduled for treatment today. \par \par 5/11/21: Pt feeling well, doing good on Keytruda. Weight is stable, has no complaints.\par \par 6/4/21: pt returns for f/u. Feels well. No irAE but pt describes extremely poor appetite. Losing weight. No other complaints\par \par 7/16/21:  Pt here for f/u. He is currently receiving  Keytruda monotherpy today with no irEAs. Pt denies SOB, rash, diarrhea, cough and itching.  He feels well and offers no complaints. Pt weight is stable and he verbalized increase appetite.  \par \par 8/27/21: Pt returns for follow up. He is feeling well but expresses concern about not being able to gain weight. No other concerns /complaints.\par \par 9/17/21:  Patient presented for f/u visit. Patient denies SOB, KENNEDY, diarrhea, rash and other irAEs.  Patient gained 1lb. Patient offers no other complaints and verbalized that he is feeling well.  \par \par 10/8/21: No complaints. \par \par 12/15/21: No complaints. No irAEs\par \par 1/28/22: Patient seen in treatment room for follow up. Reports doing well without any complaints. Denies worsening SOB, cough CP, abdominal pain, n/v/d, itching. Reports feeling generally well overall. \par \par 3/11/22: Pt has no symptoms to report. He is doing well without any irAEs\par \par 5/5/22: Pt seen via tele-visit for follow up. he has no new symptoms. Tolerating Keytruda without any irAEs. \par \par 7/15/22: Tolerating well with no AEs. Active and working.  [de-identified] : adeno ca [de-identified] : PDL1 80%

## 2022-07-21 ENCOUNTER — APPOINTMENT (OUTPATIENT)
Dept: CV DIAGNOSITCS | Facility: HOSPITAL | Age: 72
End: 2022-07-21

## 2022-07-21 ENCOUNTER — OUTPATIENT (OUTPATIENT)
Dept: OUTPATIENT SERVICES | Facility: HOSPITAL | Age: 72
LOS: 1 days | End: 2022-07-21

## 2022-07-21 DIAGNOSIS — R07.9 CHEST PAIN, UNSPECIFIED: ICD-10-CM

## 2022-07-21 DIAGNOSIS — Z98.890 OTHER SPECIFIED POSTPROCEDURAL STATES: Chronic | ICD-10-CM

## 2022-07-21 PROCEDURE — 93306 TTE W/DOPPLER COMPLETE: CPT | Mod: 26

## 2022-07-25 ENCOUNTER — FORM ENCOUNTER (OUTPATIENT)
Age: 72
End: 2022-07-25

## 2022-07-26 ENCOUNTER — APPOINTMENT (OUTPATIENT)
Dept: PULMONOLOGY | Facility: CLINIC | Age: 72
End: 2022-07-26

## 2022-07-26 VITALS
HEART RATE: 77 BPM | TEMPERATURE: 98.4 F | DIASTOLIC BLOOD PRESSURE: 72 MMHG | HEIGHT: 70 IN | BODY MASS INDEX: 17.32 KG/M2 | OXYGEN SATURATION: 95 % | RESPIRATION RATE: 17 BRPM | SYSTOLIC BLOOD PRESSURE: 112 MMHG | WEIGHT: 121 LBS

## 2022-07-26 PROCEDURE — 99214 OFFICE O/P EST MOD 30 MIN: CPT

## 2022-07-26 NOTE — HISTORY OF PRESENT ILLNESS
[TextBox_4] : Patient doing well. NOtes some dyspnea, especially with exertion, such as shoveling show in a blizzard. However, overall feels well.  Latest PET scans were significantly improved. Has been compliant with trelegy. No exacerbqtions. Feels great

## 2022-07-27 ENCOUNTER — OUTPATIENT (OUTPATIENT)
Dept: OUTPATIENT SERVICES | Facility: HOSPITAL | Age: 72
LOS: 1 days | Discharge: HOME | End: 2022-07-27

## 2022-07-27 DIAGNOSIS — C34.11 MALIGNANT NEOPLASM OF UPPER LOBE, RIGHT BRONCHUS OR LUNG: ICD-10-CM

## 2022-07-27 DIAGNOSIS — Z98.890 OTHER SPECIFIED POSTPROCEDURAL STATES: Chronic | ICD-10-CM

## 2022-08-01 ENCOUNTER — APPOINTMENT (OUTPATIENT)
Dept: CT IMAGING | Facility: CLINIC | Age: 72
End: 2022-08-01

## 2022-08-01 ENCOUNTER — OUTPATIENT (OUTPATIENT)
Dept: OUTPATIENT SERVICES | Facility: HOSPITAL | Age: 72
LOS: 1 days | End: 2022-08-01
Payer: COMMERCIAL

## 2022-08-01 DIAGNOSIS — C34.11 MALIGNANT NEOPLASM OF UPPER LOBE, RIGHT BRONCHUS OR LUNG: ICD-10-CM

## 2022-08-01 DIAGNOSIS — Z00.8 ENCOUNTER FOR OTHER GENERAL EXAMINATION: ICD-10-CM

## 2022-08-01 DIAGNOSIS — Z98.890 OTHER SPECIFIED POSTPROCEDURAL STATES: Chronic | ICD-10-CM

## 2022-08-01 PROCEDURE — 74160 CT ABDOMEN W/CONTRAST: CPT

## 2022-08-01 PROCEDURE — 71260 CT THORAX DX C+: CPT | Mod: 26

## 2022-08-01 PROCEDURE — 74160 CT ABDOMEN W/CONTRAST: CPT | Mod: 26

## 2022-08-01 PROCEDURE — 71260 CT THORAX DX C+: CPT

## 2022-08-05 ENCOUNTER — RESULT REVIEW (OUTPATIENT)
Age: 72
End: 2022-08-05

## 2022-08-05 ENCOUNTER — APPOINTMENT (OUTPATIENT)
Dept: INFUSION THERAPY | Facility: HOSPITAL | Age: 72
End: 2022-08-05

## 2022-08-05 LAB
ALBUMIN SERPL ELPH-MCNC: 4.4 G/DL — SIGNIFICANT CHANGE UP (ref 3.3–5)
ALP SERPL-CCNC: 92 U/L — SIGNIFICANT CHANGE UP (ref 40–120)
ALT FLD-CCNC: 14 U/L — SIGNIFICANT CHANGE UP (ref 10–45)
ANION GAP SERPL CALC-SCNC: 10 MMOL/L — SIGNIFICANT CHANGE UP (ref 5–17)
AST SERPL-CCNC: 35 U/L — SIGNIFICANT CHANGE UP (ref 10–40)
BASOPHILS # BLD AUTO: 0.06 K/UL — SIGNIFICANT CHANGE UP (ref 0–0.2)
BASOPHILS NFR BLD AUTO: 1 % — SIGNIFICANT CHANGE UP (ref 0–2)
BILIRUB SERPL-MCNC: 0.5 MG/DL — SIGNIFICANT CHANGE UP (ref 0.2–1.2)
BUN SERPL-MCNC: 19 MG/DL — SIGNIFICANT CHANGE UP (ref 7–23)
CALCIUM SERPL-MCNC: 9.2 MG/DL — SIGNIFICANT CHANGE UP (ref 8.4–10.5)
CHLORIDE SERPL-SCNC: 102 MMOL/L — SIGNIFICANT CHANGE UP (ref 96–108)
CO2 SERPL-SCNC: 24 MMOL/L — SIGNIFICANT CHANGE UP (ref 22–31)
CREAT SERPL-MCNC: 1.07 MG/DL — SIGNIFICANT CHANGE UP (ref 0.5–1.3)
EGFR: 75 ML/MIN/1.73M2 — SIGNIFICANT CHANGE UP
EOSINOPHIL # BLD AUTO: 0.44 K/UL — SIGNIFICANT CHANGE UP (ref 0–0.5)
EOSINOPHIL NFR BLD AUTO: 7.1 % — HIGH (ref 0–6)
GLUCOSE SERPL-MCNC: 90 MG/DL — SIGNIFICANT CHANGE UP (ref 70–99)
HCT VFR BLD CALC: 43 % — SIGNIFICANT CHANGE UP (ref 39–50)
HGB BLD-MCNC: 14.6 G/DL — SIGNIFICANT CHANGE UP (ref 13–17)
IMM GRANULOCYTES NFR BLD AUTO: 0.3 % — SIGNIFICANT CHANGE UP (ref 0–1.5)
LYMPHOCYTES # BLD AUTO: 1.37 K/UL — SIGNIFICANT CHANGE UP (ref 1–3.3)
LYMPHOCYTES # BLD AUTO: 22 % — SIGNIFICANT CHANGE UP (ref 13–44)
MCHC RBC-ENTMCNC: 32 PG — SIGNIFICANT CHANGE UP (ref 27–34)
MCHC RBC-ENTMCNC: 34 G/DL — SIGNIFICANT CHANGE UP (ref 32–36)
MCV RBC AUTO: 94.3 FL — SIGNIFICANT CHANGE UP (ref 80–100)
MONOCYTES # BLD AUTO: 0.47 K/UL — SIGNIFICANT CHANGE UP (ref 0–0.9)
MONOCYTES NFR BLD AUTO: 7.6 % — SIGNIFICANT CHANGE UP (ref 2–14)
NEUTROPHILS # BLD AUTO: 3.86 K/UL — SIGNIFICANT CHANGE UP (ref 1.8–7.4)
NEUTROPHILS NFR BLD AUTO: 62 % — SIGNIFICANT CHANGE UP (ref 43–77)
NRBC # BLD: 0 /100 WBCS — SIGNIFICANT CHANGE UP (ref 0–0)
PLATELET # BLD AUTO: 237 K/UL — SIGNIFICANT CHANGE UP (ref 150–400)
POTASSIUM SERPL-MCNC: 5.7 MMOL/L — HIGH (ref 3.5–5.3)
POTASSIUM SERPL-SCNC: 5.7 MMOL/L — HIGH (ref 3.5–5.3)
PROT SERPL-MCNC: 7.2 G/DL — SIGNIFICANT CHANGE UP (ref 6–8.3)
RBC # BLD: 4.56 M/UL — SIGNIFICANT CHANGE UP (ref 4.2–5.8)
RBC # FLD: 12.6 % — SIGNIFICANT CHANGE UP (ref 10.3–14.5)
SODIUM SERPL-SCNC: 136 MMOL/L — SIGNIFICANT CHANGE UP (ref 135–145)
TSH SERPL-MCNC: 4.38 UIU/ML — HIGH (ref 0.27–4.2)
WBC # BLD: 6.22 K/UL — SIGNIFICANT CHANGE UP (ref 3.8–10.5)
WBC # FLD AUTO: 6.22 K/UL — SIGNIFICANT CHANGE UP (ref 3.8–10.5)

## 2022-08-08 DIAGNOSIS — Z51.11 ENCOUNTER FOR ANTINEOPLASTIC CHEMOTHERAPY: ICD-10-CM

## 2022-08-26 ENCOUNTER — RESULT REVIEW (OUTPATIENT)
Age: 72
End: 2022-08-26

## 2022-08-26 ENCOUNTER — APPOINTMENT (OUTPATIENT)
Dept: HEMATOLOGY ONCOLOGY | Facility: CLINIC | Age: 72
End: 2022-08-26

## 2022-08-26 ENCOUNTER — NON-APPOINTMENT (OUTPATIENT)
Age: 72
End: 2022-08-26

## 2022-08-26 ENCOUNTER — APPOINTMENT (OUTPATIENT)
Dept: INFUSION THERAPY | Facility: HOSPITAL | Age: 72
End: 2022-08-26

## 2022-08-26 LAB
ALBUMIN SERPL ELPH-MCNC: 4.5 G/DL — SIGNIFICANT CHANGE UP (ref 3.3–5)
ALP SERPL-CCNC: 101 U/L — SIGNIFICANT CHANGE UP (ref 40–120)
ALT FLD-CCNC: 19 U/L — SIGNIFICANT CHANGE UP (ref 10–45)
ANION GAP SERPL CALC-SCNC: 15 MMOL/L — SIGNIFICANT CHANGE UP (ref 5–17)
AST SERPL-CCNC: 42 U/L — HIGH (ref 10–40)
BASOPHILS # BLD AUTO: 0.08 K/UL — SIGNIFICANT CHANGE UP (ref 0–0.2)
BASOPHILS NFR BLD AUTO: 1 % — SIGNIFICANT CHANGE UP (ref 0–2)
BILIRUB SERPL-MCNC: 0.4 MG/DL — SIGNIFICANT CHANGE UP (ref 0.2–1.2)
BUN SERPL-MCNC: 18 MG/DL — SIGNIFICANT CHANGE UP (ref 7–23)
CALCIUM SERPL-MCNC: 9.9 MG/DL — SIGNIFICANT CHANGE UP (ref 8.4–10.5)
CHLORIDE SERPL-SCNC: 102 MMOL/L — SIGNIFICANT CHANGE UP (ref 96–108)
CO2 SERPL-SCNC: 23 MMOL/L — SIGNIFICANT CHANGE UP (ref 22–31)
CREAT SERPL-MCNC: 1 MG/DL — SIGNIFICANT CHANGE UP (ref 0.5–1.3)
EGFR: 81 ML/MIN/1.73M2 — SIGNIFICANT CHANGE UP
EOSINOPHIL # BLD AUTO: 0.5 K/UL — SIGNIFICANT CHANGE UP (ref 0–0.5)
EOSINOPHIL NFR BLD AUTO: 6.2 % — HIGH (ref 0–6)
GLUCOSE SERPL-MCNC: 96 MG/DL — SIGNIFICANT CHANGE UP (ref 70–99)
HCT VFR BLD CALC: 45.7 % — SIGNIFICANT CHANGE UP (ref 39–50)
HGB BLD-MCNC: 14.6 G/DL — SIGNIFICANT CHANGE UP (ref 13–17)
IMM GRANULOCYTES NFR BLD AUTO: 0.1 % — SIGNIFICANT CHANGE UP (ref 0–1.5)
LYMPHOCYTES # BLD AUTO: 1.76 K/UL — SIGNIFICANT CHANGE UP (ref 1–3.3)
LYMPHOCYTES # BLD AUTO: 21.8 % — SIGNIFICANT CHANGE UP (ref 13–44)
MCHC RBC-ENTMCNC: 31.3 PG — SIGNIFICANT CHANGE UP (ref 27–34)
MCHC RBC-ENTMCNC: 31.9 G/DL — LOW (ref 32–36)
MCV RBC AUTO: 97.9 FL — SIGNIFICANT CHANGE UP (ref 80–100)
MONOCYTES # BLD AUTO: 0.74 K/UL — SIGNIFICANT CHANGE UP (ref 0–0.9)
MONOCYTES NFR BLD AUTO: 9.2 % — SIGNIFICANT CHANGE UP (ref 2–14)
NEUTROPHILS # BLD AUTO: 4.99 K/UL — SIGNIFICANT CHANGE UP (ref 1.8–7.4)
NEUTROPHILS NFR BLD AUTO: 61.7 % — SIGNIFICANT CHANGE UP (ref 43–77)
NRBC # BLD: 0 /100 WBCS — SIGNIFICANT CHANGE UP (ref 0–0)
PLATELET # BLD AUTO: 266 K/UL — SIGNIFICANT CHANGE UP (ref 150–400)
POTASSIUM SERPL-MCNC: 6.8 MMOL/L — CRITICAL HIGH (ref 3.5–5.3)
POTASSIUM SERPL-SCNC: 6.8 MMOL/L — CRITICAL HIGH (ref 3.5–5.3)
PROT SERPL-MCNC: 7.7 G/DL — SIGNIFICANT CHANGE UP (ref 6–8.3)
RBC # BLD: 4.67 M/UL — SIGNIFICANT CHANGE UP (ref 4.2–5.8)
RBC # FLD: 12.6 % — SIGNIFICANT CHANGE UP (ref 10.3–14.5)
SODIUM SERPL-SCNC: 141 MMOL/L — SIGNIFICANT CHANGE UP (ref 135–145)
TSH SERPL-MCNC: 2.08 UIU/ML — SIGNIFICANT CHANGE UP (ref 0.27–4.2)
WBC # BLD: 8.08 K/UL — SIGNIFICANT CHANGE UP (ref 3.8–10.5)
WBC # FLD AUTO: 8.08 K/UL — SIGNIFICANT CHANGE UP (ref 3.8–10.5)

## 2022-08-26 PROCEDURE — 99214 OFFICE O/P EST MOD 30 MIN: CPT

## 2022-08-26 RX ORDER — APIXABAN 5 MG/1
5 TABLET, FILM COATED ORAL
Qty: 60 | Refills: 4 | Status: DISCONTINUED | COMMUNITY
Start: 2021-01-22 | End: 2022-08-26

## 2022-08-30 NOTE — ASSESSMENT
[Palliative] : Goals of care discussed with patient: Palliative [FreeTextEntry1] : 71 yo with newly diagnosed NSCLC stage IV . PDL1 was 80% but tumor NGS showed high TMB. Blood NGS through [facility] revealed- HRAS and TP53 mutations.\par  \par Pt started with carbo/alimta/pembro as front line option on 11/6/2020. Completed 4 cycles wo irAEs. \par Now on Keytruda monotherapy as maintenance. Will continue on Keytruda q3w regimen. PET/CT 12/13/21 and April 2022 reviewed in detail personally and showed excellent near-CR. \par Patient had CT Chest/abdomen on 8/1/22 and was stable. Reviewed results with patient. \par We discussed the importance of close monitoring with labs, toxicity and efficacy assessment, while on this high risk medication (Keytruda). Pt verbalized understanding. \par Reiterated importance of regular follow up\par \par \par \par

## 2022-08-30 NOTE — REVIEW OF SYSTEMS
[Negative] : Heme/Lymph [Fever] : no fever [Fatigue] : no fatigue [Dysphagia] : no dysphagia [Chest Pain] : no chest pain [Shortness Of Breath] : no shortness of breath [SOB on Exertion] : no shortness of breath during exertion [Abdominal Pain] : no abdominal pain [Vomiting] : no vomiting [Diarrhea] : no diarrhea

## 2022-08-30 NOTE — HISTORY OF PRESENT ILLNESS
[Disease: _____________________] : Disease: [unfilled] [M: ___] : M[unfilled] [AJCC Stage: ____] : AJCC Stage: [unfilled] [de-identified] : 71 yo M with tobacco smoking hx and 911 exposure ( being followed at [facility],  was diagnosed with COPD/emphysema (dx soon after 9/11), had screening CT in 2019 which showed a lung mass. Work up at [facility]- solitary mass which was resected (benign-organizing PNA). He was referred to pulm grant a year ago by [facility] program but he made an appointment this year with Dr. Pritchard. He referred him for screening CT in Feb which was done in August and showed right upper lobe 3.7 x 3.8 cm mass with interval increase in size since January 7, 2019 worrisome for neoplasm possibly a primary lung neoplasm. Multiple bilateral smaller nodular opacities are new since January 7, 2019 may represent metastatic disease. Enlarged anterior mediastinal lymph node worrisome for metastatic disease. PET/CT 8/25/2020 IMPRESSION: Since outside PET/CT 1/30/2019: Considerable interval increased size and FDG avidity of a now spiculated right upper lobe mass with central necrosis suspicious for primary lung malignancy. New FDG avid enlarged mediastinal lymph nodes and bilateral pulmonary nodules probably metastatic disease. Indeterminate FDG avid left intraparotid focus, not well evaluated secondary to misregistration artifact, however appears new since prior PET/CT, possibly metastatic. He underwent CT guided bx by Dr. Barraza at [facility] which was suggestive of malignancy. He subsequently underwent bronch and EBUS/bx of level 4 LN which was c/w adeno ca, lung primary. He is still doing well from a symptomatic standpoint. He has lost some weight which he usually does every Summer. He denies fevers, chills, dyspnea, chest pain/tightness, cough, wheeze. Continues to work on a full-time basis as a . \par 10/27/20: pt has no new complaints. He had Ct scans and MRi of brain interval enlargement of multiple bilateral pulmonary masses and mediastinal adenopathy consistent with progressive disease. CT abdomen and MRI brain revealed no mets. \par \par 11/6/2020: Pt presents prior to starting treatment today to review regimen, address any concerns and provide written consent. He offers no new complaints. \par \par 11/27/2020: Pt returns for follow up. S/p first cycle. Tolerating well . NOT SMOKING!!!!! Doing well. Had one episode of near syncope (vaso-vagal). Pt states he was very dehydrated. No further episodes since. Pt states he "feels great"\par \par 12/18/2020: Pt returns for follow up. Tolerating treatment wo any AE. Only reports mild occasional fatigue. Scheduled for C#3 today\par \par 1/8/2021: Pt returns for follow up and discussion of findings on recent imaging. He is feeling well. Appetite good. Occasional fatigue. States he is losing his hair and is very unhappy about it. \par \par 1/14/21: Patient came to clinic for follow up today, he is s/p C4 Carbo/Alimta, Keytruda on 1/8/21, he reports worsening fatigue, poor appetite but stable weight, also reports  for past 2-3 days and  today, reports burning sensation feeling in chest that radiates to his throat and  nausea. Denies sob and skin rash. He did not take any BP meds today as his BP was in 100s He also experiences constipation. ECG was done in the office and showed some flip T waves in anterior leads. Plan to send him to [facility] to evaluate.\par \par 1/29/21: Pt had a prolonged recovery period after last treatment. Nausea, dysphagia, weight loss, and chest pain. As noted above, pt was hospitalized. No acute etiology was found and he was discharged home., He feels better now. Started eating again. CT scan in the hospital on 1/14 revealed  \par 1. Small right upper lobe pulmonary embolism. No evidence of pulmonary infarct or right heart strain.\par 2. Right upper lobe pulmonary mass and bilateral pulmonary nodules are unchanged from 01/05/2021.\par 3. There is extensive wall thickening in the proximal duodenum and within left upper quadrant small bowel loops, compatible with enteritis. Upper abdominal free fluid seen between the duodenum and pancreas and within the left upper quadrant, inferior to the pancreas, may be related to enteritis. Superimposed pancreatitis should be excluded clinically.\par 4. Free fluid in the right perinephric space, posterior-medial to the right kidney is new from 01/05/2021. Underlying genitourinary infection/pyelonephritis should be excluded clinically.\par \par He has lost a lot of weight since last visit. \par \par 2/19/21: Pt reports feeling much better. No ae from last treatment with monotherapy Keytruda. Pt has been seeing GI and cologard colon cancer assay was reported as positive and patient has been scheduled for colonoscopy on 3/19/21. No new complaints. \par \par 4/2/21: Pt returns for follow up. He is feeling well. States he had colonoscopy and endoscopy last week and "everything was normal." Tolerating treatment well. Offers no complaints. \par \par 4/23/21: Pt returns for follow up and discussion of findings on recent imaging. Offers no complaints. Feeling well. Scheduled for treatment today. \par \par 5/11/21: Pt feeling well, doing good on Keytruda. Weight is stable, has no complaints.\par \par 6/4/21: pt returns for f/u. Feels well. No irAE but pt describes extremely poor appetite. Losing weight. No other complaints\par \par 7/16/21:  Pt here for f/u. He is currently receiving  Keytruda monotherpy today with no irEAs. Pt denies SOB, rash, diarrhea, cough and itching.  He feels well and offers no complaints. Pt weight is stable and he verbalized increase appetite.  \par \par 8/27/21: Pt returns for follow up. He is feeling well but expresses concern about not being able to gain weight. No other concerns /complaints.\par \par 9/17/21:  Patient presented for f/u visit. Patient denies SOB, KENNEDY, diarrhea, rash and other irAEs.  Patient gained 1lb. Patient offers no other complaints and verbalized that he is feeling well.  \par \par 10/8/21: No complaints. \par \par 12/15/21: No complaints. No irAEs\par \par 1/28/22: Patient seen in treatment room for follow up. Reports doing well without any complaints. Denies worsening SOB, cough CP, abdominal pain, n/v/d, itching. Reports feeling generally well overall. \par \par 3/11/22: Pt has no symptoms to report. He is doing well without any irAEs\par \par 5/5/22: Pt seen via tele-visit for follow up. he has no new symptoms. Tolerating Keytruda without any irAEs. \par \par 7/15/22: Tolerating well with no AEs. Active and working. \par \par 8/26/22: Patient seen today in treatment room for follow up. He is doing well and tolerating Keytruda without AEs [de-identified] : adeno ca [de-identified] : PDL1 80%

## 2022-09-06 ENCOUNTER — APPOINTMENT (OUTPATIENT)
Dept: OTHER | Facility: CLINIC | Age: 72
End: 2022-09-06

## 2022-09-06 ENCOUNTER — NON-APPOINTMENT (OUTPATIENT)
Age: 72
End: 2022-09-06

## 2022-09-06 DIAGNOSIS — Z23 ENCOUNTER FOR IMMUNIZATION: ICD-10-CM

## 2022-09-06 PROCEDURE — 99442: CPT | Mod: 95

## 2022-09-06 PROCEDURE — 99397 PER PM REEVAL EST PAT 65+ YR: CPT | Mod: 95

## 2022-09-09 NOTE — ED CDU PROVIDER INITIAL DAY NOTE - PHYSICAL EXAMINATION
Gen: AAOx3, non-toxic  Head: NCAT  HEENT: EOMI, oral mucosa moist, normal conjunctiva  Lung: CTAB, no respiratory distress, no wheezes/rhonchi/rales B/L, speaking in full sentences  CV: RRR, no murmurs, rubs or gallops  Abd: soft, NTND, no guarding, no CVA tenderness  MSK: no visible deformities  Neuro: No focal sensory or motor deficits  Skin: Warm, well perfused, no rash  Psych: normal affect. none

## 2022-09-16 ENCOUNTER — RESULT REVIEW (OUTPATIENT)
Age: 72
End: 2022-09-16

## 2022-09-16 ENCOUNTER — NON-APPOINTMENT (OUTPATIENT)
Age: 72
End: 2022-09-16

## 2022-09-16 ENCOUNTER — APPOINTMENT (OUTPATIENT)
Dept: INFUSION THERAPY | Facility: HOSPITAL | Age: 72
End: 2022-09-16

## 2022-09-16 LAB
ALBUMIN SERPL ELPH-MCNC: 4.4 G/DL — SIGNIFICANT CHANGE UP (ref 3.3–5)
ALP SERPL-CCNC: 96 U/L — SIGNIFICANT CHANGE UP (ref 40–120)
ALT FLD-CCNC: 13 U/L — SIGNIFICANT CHANGE UP (ref 10–45)
ANION GAP SERPL CALC-SCNC: 11 MMOL/L — SIGNIFICANT CHANGE UP (ref 5–17)
AST SERPL-CCNC: 41 U/L — HIGH (ref 10–40)
BASOPHILS # BLD AUTO: 0.08 K/UL — SIGNIFICANT CHANGE UP (ref 0–0.2)
BASOPHILS NFR BLD AUTO: 1.4 % — SIGNIFICANT CHANGE UP (ref 0–2)
BILIRUB SERPL-MCNC: 0.3 MG/DL — SIGNIFICANT CHANGE UP (ref 0.2–1.2)
BUN SERPL-MCNC: 14 MG/DL — SIGNIFICANT CHANGE UP (ref 7–23)
CALCIUM SERPL-MCNC: 9.5 MG/DL — SIGNIFICANT CHANGE UP (ref 8.4–10.5)
CHLORIDE SERPL-SCNC: 104 MMOL/L — SIGNIFICANT CHANGE UP (ref 96–108)
CO2 SERPL-SCNC: 22 MMOL/L — SIGNIFICANT CHANGE UP (ref 22–31)
CREAT SERPL-MCNC: 0.88 MG/DL — SIGNIFICANT CHANGE UP (ref 0.5–1.3)
EGFR: 93 ML/MIN/1.73M2 — SIGNIFICANT CHANGE UP
EOSINOPHIL # BLD AUTO: 0.41 K/UL — SIGNIFICANT CHANGE UP (ref 0–0.5)
EOSINOPHIL NFR BLD AUTO: 7 % — HIGH (ref 0–6)
GLUCOSE SERPL-MCNC: 85 MG/DL — SIGNIFICANT CHANGE UP (ref 70–99)
HCT VFR BLD CALC: 44.2 % — SIGNIFICANT CHANGE UP (ref 39–50)
HGB BLD-MCNC: 14.6 G/DL — SIGNIFICANT CHANGE UP (ref 13–17)
IMM GRANULOCYTES NFR BLD AUTO: 0.3 % — SIGNIFICANT CHANGE UP (ref 0–0.9)
LYMPHOCYTES # BLD AUTO: 1.42 K/UL — SIGNIFICANT CHANGE UP (ref 1–3.3)
LYMPHOCYTES # BLD AUTO: 24.4 % — SIGNIFICANT CHANGE UP (ref 13–44)
MCHC RBC-ENTMCNC: 31.7 PG — SIGNIFICANT CHANGE UP (ref 27–34)
MCHC RBC-ENTMCNC: 33 G/DL — SIGNIFICANT CHANGE UP (ref 32–36)
MCV RBC AUTO: 95.9 FL — SIGNIFICANT CHANGE UP (ref 80–100)
MONOCYTES # BLD AUTO: 0.45 K/UL — SIGNIFICANT CHANGE UP (ref 0–0.9)
MONOCYTES NFR BLD AUTO: 7.7 % — SIGNIFICANT CHANGE UP (ref 2–14)
NEUTROPHILS # BLD AUTO: 3.44 K/UL — SIGNIFICANT CHANGE UP (ref 1.8–7.4)
NEUTROPHILS NFR BLD AUTO: 59.2 % — SIGNIFICANT CHANGE UP (ref 43–77)
NRBC # BLD: 0 /100 WBCS — SIGNIFICANT CHANGE UP (ref 0–0)
PLATELET # BLD AUTO: 272 K/UL — SIGNIFICANT CHANGE UP (ref 150–400)
POTASSIUM SERPL-MCNC: 6.8 MMOL/L — CRITICAL HIGH (ref 3.5–5.3)
POTASSIUM SERPL-SCNC: 6.8 MMOL/L — CRITICAL HIGH (ref 3.5–5.3)
PROT SERPL-MCNC: 7.6 G/DL — SIGNIFICANT CHANGE UP (ref 6–8.3)
RBC # BLD: 4.61 M/UL — SIGNIFICANT CHANGE UP (ref 4.2–5.8)
RBC # FLD: 12.7 % — SIGNIFICANT CHANGE UP (ref 10.3–14.5)
SODIUM SERPL-SCNC: 137 MMOL/L — SIGNIFICANT CHANGE UP (ref 135–145)
TSH SERPL-MCNC: 1.93 UIU/ML — SIGNIFICANT CHANGE UP (ref 0.27–4.2)
WBC # BLD: 5.82 K/UL — SIGNIFICANT CHANGE UP (ref 3.8–10.5)
WBC # FLD AUTO: 5.82 K/UL — SIGNIFICANT CHANGE UP (ref 3.8–10.5)

## 2022-09-30 ENCOUNTER — OUTPATIENT (OUTPATIENT)
Dept: OUTPATIENT SERVICES | Facility: HOSPITAL | Age: 72
LOS: 1 days | Discharge: HOME | End: 2022-09-30

## 2022-09-30 DIAGNOSIS — C34.11 MALIGNANT NEOPLASM OF UPPER LOBE, RIGHT BRONCHUS OR LUNG: ICD-10-CM

## 2022-09-30 DIAGNOSIS — Z98.890 OTHER SPECIFIED POSTPROCEDURAL STATES: Chronic | ICD-10-CM

## 2022-10-07 ENCOUNTER — RESULT REVIEW (OUTPATIENT)
Age: 72
End: 2022-10-07

## 2022-10-07 ENCOUNTER — APPOINTMENT (OUTPATIENT)
Dept: INFUSION THERAPY | Facility: HOSPITAL | Age: 72
End: 2022-10-07

## 2022-10-07 LAB
ALBUMIN SERPL ELPH-MCNC: 4.5 G/DL — SIGNIFICANT CHANGE UP (ref 3.3–5)
ALP SERPL-CCNC: 113 U/L — SIGNIFICANT CHANGE UP (ref 40–120)
ALT FLD-CCNC: 13 U/L — SIGNIFICANT CHANGE UP (ref 10–45)
ANION GAP SERPL CALC-SCNC: 13 MMOL/L — SIGNIFICANT CHANGE UP (ref 5–17)
AST SERPL-CCNC: 34 U/L — SIGNIFICANT CHANGE UP (ref 10–40)
BASOPHILS # BLD AUTO: 0.09 K/UL — SIGNIFICANT CHANGE UP (ref 0–0.2)
BASOPHILS NFR BLD AUTO: 1.1 % — SIGNIFICANT CHANGE UP (ref 0–2)
BILIRUB SERPL-MCNC: 0.8 MG/DL — SIGNIFICANT CHANGE UP (ref 0.2–1.2)
BUN SERPL-MCNC: 19 MG/DL — SIGNIFICANT CHANGE UP (ref 7–23)
CALCIUM SERPL-MCNC: 9.6 MG/DL — SIGNIFICANT CHANGE UP (ref 8.4–10.5)
CHLORIDE SERPL-SCNC: 101 MMOL/L — SIGNIFICANT CHANGE UP (ref 96–108)
CO2 SERPL-SCNC: 22 MMOL/L — SIGNIFICANT CHANGE UP (ref 22–31)
CREAT SERPL-MCNC: 1.11 MG/DL — SIGNIFICANT CHANGE UP (ref 0.5–1.3)
EGFR: 71 ML/MIN/1.73M2 — SIGNIFICANT CHANGE UP
EOSINOPHIL # BLD AUTO: 0.47 K/UL — SIGNIFICANT CHANGE UP (ref 0–0.5)
EOSINOPHIL NFR BLD AUTO: 5.8 % — SIGNIFICANT CHANGE UP (ref 0–6)
GLUCOSE SERPL-MCNC: 92 MG/DL — SIGNIFICANT CHANGE UP (ref 70–99)
HCT VFR BLD CALC: 43.6 % — SIGNIFICANT CHANGE UP (ref 39–50)
HGB BLD-MCNC: 14.7 G/DL — SIGNIFICANT CHANGE UP (ref 13–17)
IMM GRANULOCYTES NFR BLD AUTO: 0.4 % — SIGNIFICANT CHANGE UP (ref 0–0.9)
LYMPHOCYTES # BLD AUTO: 1.65 K/UL — SIGNIFICANT CHANGE UP (ref 1–3.3)
LYMPHOCYTES # BLD AUTO: 20.2 % — SIGNIFICANT CHANGE UP (ref 13–44)
MCHC RBC-ENTMCNC: 31.3 PG — SIGNIFICANT CHANGE UP (ref 27–34)
MCHC RBC-ENTMCNC: 33.7 G/DL — SIGNIFICANT CHANGE UP (ref 32–36)
MCV RBC AUTO: 92.8 FL — SIGNIFICANT CHANGE UP (ref 80–100)
MONOCYTES # BLD AUTO: 0.62 K/UL — SIGNIFICANT CHANGE UP (ref 0–0.9)
MONOCYTES NFR BLD AUTO: 7.6 % — SIGNIFICANT CHANGE UP (ref 2–14)
NEUTROPHILS # BLD AUTO: 5.31 K/UL — SIGNIFICANT CHANGE UP (ref 1.8–7.4)
NEUTROPHILS NFR BLD AUTO: 64.9 % — SIGNIFICANT CHANGE UP (ref 43–77)
NRBC # BLD: 0 /100 WBCS — SIGNIFICANT CHANGE UP (ref 0–0)
PLATELET # BLD AUTO: 265 K/UL — SIGNIFICANT CHANGE UP (ref 150–400)
POTASSIUM SERPL-MCNC: 6.3 MMOL/L — CRITICAL HIGH (ref 3.5–5.3)
POTASSIUM SERPL-SCNC: 6.3 MMOL/L — CRITICAL HIGH (ref 3.5–5.3)
PROT SERPL-MCNC: 7.9 G/DL — SIGNIFICANT CHANGE UP (ref 6–8.3)
RBC # BLD: 4.7 M/UL — SIGNIFICANT CHANGE UP (ref 4.2–5.8)
RBC # FLD: 12.2 % — SIGNIFICANT CHANGE UP (ref 10.3–14.5)
SODIUM SERPL-SCNC: 136 MMOL/L — SIGNIFICANT CHANGE UP (ref 135–145)
WBC # BLD: 8.17 K/UL — SIGNIFICANT CHANGE UP (ref 3.8–10.5)
WBC # FLD AUTO: 8.17 K/UL — SIGNIFICANT CHANGE UP (ref 3.8–10.5)

## 2022-10-10 DIAGNOSIS — Z51.11 ENCOUNTER FOR ANTINEOPLASTIC CHEMOTHERAPY: ICD-10-CM

## 2022-10-10 DIAGNOSIS — C34.90 MALIGNANT NEOPLASM OF UNSPECIFIED PART OF UNSPECIFIED BRONCHUS OR LUNG: ICD-10-CM

## 2022-10-28 ENCOUNTER — RESULT REVIEW (OUTPATIENT)
Age: 72
End: 2022-10-28

## 2022-10-28 ENCOUNTER — NON-APPOINTMENT (OUTPATIENT)
Age: 72
End: 2022-10-28

## 2022-10-28 ENCOUNTER — APPOINTMENT (OUTPATIENT)
Dept: HEMATOLOGY ONCOLOGY | Facility: CLINIC | Age: 72
End: 2022-10-28

## 2022-10-28 ENCOUNTER — APPOINTMENT (OUTPATIENT)
Dept: INFUSION THERAPY | Facility: HOSPITAL | Age: 72
End: 2022-10-28

## 2022-10-28 LAB
ALBUMIN SERPL ELPH-MCNC: 4.6 G/DL — SIGNIFICANT CHANGE UP (ref 3.3–5)
ALP SERPL-CCNC: 100 U/L — SIGNIFICANT CHANGE UP (ref 40–120)
ALT FLD-CCNC: 12 U/L — SIGNIFICANT CHANGE UP (ref 10–45)
ANION GAP SERPL CALC-SCNC: 10 MMOL/L — SIGNIFICANT CHANGE UP (ref 5–17)
AST SERPL-CCNC: 41 U/L — HIGH (ref 10–40)
BASOPHILS # BLD AUTO: 0.09 K/UL — SIGNIFICANT CHANGE UP (ref 0–0.2)
BASOPHILS NFR BLD AUTO: 1.3 % — SIGNIFICANT CHANGE UP (ref 0–2)
BILIRUB SERPL-MCNC: 0.6 MG/DL — SIGNIFICANT CHANGE UP (ref 0.2–1.2)
BUN SERPL-MCNC: 15 MG/DL — SIGNIFICANT CHANGE UP (ref 7–23)
CALCIUM SERPL-MCNC: 9.9 MG/DL — SIGNIFICANT CHANGE UP (ref 8.4–10.5)
CHLORIDE SERPL-SCNC: 101 MMOL/L — SIGNIFICANT CHANGE UP (ref 96–108)
CO2 SERPL-SCNC: 25 MMOL/L — SIGNIFICANT CHANGE UP (ref 22–31)
CREAT SERPL-MCNC: 1.02 MG/DL — SIGNIFICANT CHANGE UP (ref 0.5–1.3)
EGFR: 79 ML/MIN/1.73M2 — SIGNIFICANT CHANGE UP
EOSINOPHIL # BLD AUTO: 0.61 K/UL — HIGH (ref 0–0.5)
EOSINOPHIL NFR BLD AUTO: 8.6 % — HIGH (ref 0–6)
GLUCOSE SERPL-MCNC: 86 MG/DL — SIGNIFICANT CHANGE UP (ref 70–99)
HCT VFR BLD CALC: 42.4 % — SIGNIFICANT CHANGE UP (ref 39–50)
HGB BLD-MCNC: 14 G/DL — SIGNIFICANT CHANGE UP (ref 13–17)
IMM GRANULOCYTES NFR BLD AUTO: 0.3 % — SIGNIFICANT CHANGE UP (ref 0–0.9)
LYMPHOCYTES # BLD AUTO: 1.59 K/UL — SIGNIFICANT CHANGE UP (ref 1–3.3)
LYMPHOCYTES # BLD AUTO: 22.5 % — SIGNIFICANT CHANGE UP (ref 13–44)
MCHC RBC-ENTMCNC: 31.1 PG — SIGNIFICANT CHANGE UP (ref 27–34)
MCHC RBC-ENTMCNC: 33 G/DL — SIGNIFICANT CHANGE UP (ref 32–36)
MCV RBC AUTO: 94.2 FL — SIGNIFICANT CHANGE UP (ref 80–100)
MONOCYTES # BLD AUTO: 0.55 K/UL — SIGNIFICANT CHANGE UP (ref 0–0.9)
MONOCYTES NFR BLD AUTO: 7.8 % — SIGNIFICANT CHANGE UP (ref 2–14)
NEUTROPHILS # BLD AUTO: 4.2 K/UL — SIGNIFICANT CHANGE UP (ref 1.8–7.4)
NEUTROPHILS NFR BLD AUTO: 59.5 % — SIGNIFICANT CHANGE UP (ref 43–77)
NRBC # BLD: 0 /100 WBCS — SIGNIFICANT CHANGE UP (ref 0–0)
PLATELET # BLD AUTO: 257 K/UL — SIGNIFICANT CHANGE UP (ref 150–400)
POTASSIUM SERPL-MCNC: 7.6 MMOL/L — CRITICAL HIGH (ref 3.5–5.3)
POTASSIUM SERPL-SCNC: 7.6 MMOL/L — CRITICAL HIGH (ref 3.5–5.3)
PROT SERPL-MCNC: 8 G/DL — SIGNIFICANT CHANGE UP (ref 6–8.3)
RBC # BLD: 4.5 M/UL — SIGNIFICANT CHANGE UP (ref 4.2–5.8)
RBC # FLD: 12.3 % — SIGNIFICANT CHANGE UP (ref 10.3–14.5)
SODIUM SERPL-SCNC: 135 MMOL/L — SIGNIFICANT CHANGE UP (ref 135–145)
TSH SERPL-MCNC: 2.32 UIU/ML — SIGNIFICANT CHANGE UP (ref 0.27–4.2)
WBC # BLD: 7.06 K/UL — SIGNIFICANT CHANGE UP (ref 3.8–10.5)
WBC # FLD AUTO: 7.06 K/UL — SIGNIFICANT CHANGE UP (ref 3.8–10.5)

## 2022-10-28 PROCEDURE — 99214 OFFICE O/P EST MOD 30 MIN: CPT

## 2022-11-06 NOTE — HISTORY OF PRESENT ILLNESS
[Disease: _____________________] : Disease: [unfilled] [M: ___] : M[unfilled] [AJCC Stage: ____] : AJCC Stage: [unfilled] [de-identified] : 73 yo M with tobacco smoking hx and 911 exposure ( being followed at [facility],  was diagnosed with COPD/emphysema (dx soon after 9/11), had screening CT in 2019 which showed a lung mass. Work up at [facility]- solitary mass which was resected (benign-organizing PNA). He was referred to pulm grant a year ago by [facility] program but he made an appointment this year with Dr. Pritchard. He referred him for screening CT in Feb which was done in August and showed right upper lobe 3.7 x 3.8 cm mass with interval increase in size since January 7, 2019 worrisome for neoplasm possibly a primary lung neoplasm. Multiple bilateral smaller nodular opacities are new since January 7, 2019 may represent metastatic disease. Enlarged anterior mediastinal lymph node worrisome for metastatic disease. PET/CT 8/25/2020 IMPRESSION: Since outside PET/CT 1/30/2019: Considerable interval increased size and FDG avidity of a now spiculated right upper lobe mass with central necrosis suspicious for primary lung malignancy. New FDG avid enlarged mediastinal lymph nodes and bilateral pulmonary nodules probably metastatic disease. Indeterminate FDG avid left intraparotid focus, not well evaluated secondary to misregistration artifact, however appears new since prior PET/CT, possibly metastatic. He underwent CT guided bx by Dr. Barraza at [facility] which was suggestive of malignancy. He subsequently underwent bronch and EBUS/bx of level 4 LN which was c/w adeno ca, lung primary. He is still doing well from a symptomatic standpoint. He has lost some weight which he usually does every Summer. He denies fevers, chills, dyspnea, chest pain/tightness, cough, wheeze. Continues to work on a full-time basis as a . \par 10/27/20: pt has no new complaints. He had Ct scans and MRi of brain interval enlargement of multiple bilateral pulmonary masses and mediastinal adenopathy consistent with progressive disease. CT abdomen and MRI brain revealed no mets. \par \par 11/6/2020: Pt presents prior to starting treatment today to review regimen, address any concerns and provide written consent. He offers no new complaints. \par \par 11/27/2020: Pt returns for follow up. S/p first cycle. Tolerating well . NOT SMOKING!!!!! Doing well. Had one episode of near syncope (vaso-vagal). Pt states he was very dehydrated. No further episodes since. Pt states he "feels great"\par \par 12/18/2020: Pt returns for follow up. Tolerating treatment wo any AE. Only reports mild occasional fatigue. Scheduled for C#3 today\par \par 1/8/2021: Pt returns for follow up and discussion of findings on recent imaging. He is feeling well. Appetite good. Occasional fatigue. States he is losing his hair and is very unhappy about it. \par \par 1/14/21: Patient came to clinic for follow up today, he is s/p C4 Carbo/Alimta, Keytruda on 1/8/21, he reports worsening fatigue, poor appetite but stable weight, also reports  for past 2-3 days and  today, reports burning sensation feeling in chest that radiates to his throat and  nausea. Denies sob and skin rash. He did not take any BP meds today as his BP was in 100s He also experiences constipation. ECG was done in the office and showed some flip T waves in anterior leads. Plan to send him to [facility] to evaluate.\par \par 1/29/21: Pt had a prolonged recovery period after last treatment. Nausea, dysphagia, weight loss, and chest pain. As noted above, pt was hospitalized. No acute etiology was found and he was discharged home., He feels better now. Started eating again. CT scan in the hospital on 1/14 revealed  \par 1. Small right upper lobe pulmonary embolism. No evidence of pulmonary infarct or right heart strain.\par 2. Right upper lobe pulmonary mass and bilateral pulmonary nodules are unchanged from 01/05/2021.\par 3. There is extensive wall thickening in the proximal duodenum and within left upper quadrant small bowel loops, compatible with enteritis. Upper abdominal free fluid seen between the duodenum and pancreas and within the left upper quadrant, inferior to the pancreas, may be related to enteritis. Superimposed pancreatitis should be excluded clinically.\par 4. Free fluid in the right perinephric space, posterior-medial to the right kidney is new from 01/05/2021. Underlying genitourinary infection/pyelonephritis should be excluded clinically.\par \par He has lost a lot of weight since last visit. \par \par 2/19/21: Pt reports feeling much better. No ae from last treatment with monotherapy Keytruda. Pt has been seeing GI and cologard colon cancer assay was reported as positive and patient has been scheduled for colonoscopy on 3/19/21. No new complaints. \par \par 4/2/21: Pt returns for follow up. He is feeling well. States he had colonoscopy and endoscopy last week and "everything was normal." Tolerating treatment well. Offers no complaints. \par \par 4/23/21: Pt returns for follow up and discussion of findings on recent imaging. Offers no complaints. Feeling well. Scheduled for treatment today. \par \par 5/11/21: Pt feeling well, doing good on Keytruda. Weight is stable, has no complaints.\par \par 6/4/21: pt returns for f/u. Feels well. No irAE but pt describes extremely poor appetite. Losing weight. No other complaints\par \par 7/16/21:  Pt here for f/u. He is currently receiving  Keytruda monotherpy today with no irEAs. Pt denies SOB, rash, diarrhea, cough and itching.  He feels well and offers no complaints. Pt weight is stable and he verbalized increase appetite.  \par \par 8/27/21: Pt returns for follow up. He is feeling well but expresses concern about not being able to gain weight. No other concerns /complaints.\par \par 9/17/21:  Patient presented for f/u visit. Patient denies SOB, KENNEDY, diarrhea, rash and other irAEs.  Patient gained 1lb. Patient offers no other complaints and verbalized that he is feeling well.  \par \par 10/8/21: No complaints. \par \par 12/15/21: No complaints. No irAEs\par \par 1/28/22: Patient seen in treatment room for follow up. Reports doing well without any complaints. Denies worsening SOB, cough CP, abdominal pain, n/v/d, itching. Reports feeling generally well overall. \par \par 3/11/22: Pt has no symptoms to report. He is doing well without any irAEs\par \par 5/5/22: Pt seen via tele-visit for follow up. he has no new symptoms. Tolerating Keytruda without any irAEs. \par \par 7/15/22: Tolerating well with no AEs. Active and working. \par \par 8/26/22: Patient seen today in treatment room for follow up. He is doing well and tolerating Keytruda without AEs\par \par 10/29/22: Pt here for follow up. He is doing well with no AEs. Still smoking occasionally, due to peer influence at work.  [de-identified] : adeno ca [de-identified] : PDL1 80%

## 2022-11-06 NOTE — ASSESSMENT
[Palliative] : Goals of care discussed with patient: Palliative [FreeTextEntry1] : 71 yo with newly diagnosed NSCLC stage IV . PDL1 was 80% and tumor NGS showed high TMB. Blood NGS through [facility] revealed- HRAS and TP53 mutations.\par  \par Pt started with carbo/alimta/pembro as front line option on 11/6/2020. Completed 4 cycles wo irAEs. \par Now on Keytruda monotherapy as maintenance. Will continue on Keytruda q3w regimen. PET/CT 12/13/21 and April 2022 reviewed in detail personally and showed excellent response. I reviewed the most recent CT done in August with pt- this shows a stable 2.3 cm right upper lobe nodule corresponding to the patient's treated non-small cell lung cancer, as well as an additional stable 0.7 cm left upper lobe nodule and 1.3 cm right hilar lymphadenopathy.\par Pt will complete 2 years of maintenance end of this year, after which we can stop tx as per current SOC. There is however, some evidence that continued tx may result in prolonged PFS and survival. We will discuss the pros and cons in detail at next visit. Decision may also be dependent on insurance policies and guidelines. \par We discussed the importance of close monitoring with labs, toxicity and efficacy assessment, while on this high risk medication (Keytruda). Pt verbalized understanding. \par I strongly emphasized the importance of tobacco cessation (which pt has taken up again due to peer influence at work)\par OV in 6 weeks after repeat scans. \par emotional support provided since pt is stressed due to wife's recent cancer recurrence dx\par \par \par \par

## 2022-11-06 NOTE — REVIEW OF SYSTEMS
[Fever] : no fever [Fatigue] : no fatigue [Dysphagia] : no dysphagia [Chest Pain] : no chest pain [Shortness Of Breath] : no shortness of breath [SOB on Exertion] : shortness of breath during exertion [Abdominal Pain] : no abdominal pain [Vomiting] : no vomiting [Diarrhea] : no diarrhea [Negative] : Musculoskeletal

## 2022-11-11 ENCOUNTER — RESULT REVIEW (OUTPATIENT)
Age: 72
End: 2022-11-11

## 2022-11-17 ENCOUNTER — APPOINTMENT (OUTPATIENT)
Dept: CT IMAGING | Facility: CLINIC | Age: 72
End: 2022-11-17

## 2022-11-17 ENCOUNTER — OUTPATIENT (OUTPATIENT)
Dept: OUTPATIENT SERVICES | Facility: HOSPITAL | Age: 72
LOS: 1 days | End: 2022-11-17
Payer: COMMERCIAL

## 2022-11-17 DIAGNOSIS — C34.11 MALIGNANT NEOPLASM OF UPPER LOBE, RIGHT BRONCHUS OR LUNG: ICD-10-CM

## 2022-11-17 DIAGNOSIS — Z98.890 OTHER SPECIFIED POSTPROCEDURAL STATES: Chronic | ICD-10-CM

## 2022-11-17 DIAGNOSIS — Z00.8 ENCOUNTER FOR OTHER GENERAL EXAMINATION: ICD-10-CM

## 2022-11-17 PROCEDURE — 71260 CT THORAX DX C+: CPT | Mod: 26

## 2022-11-17 PROCEDURE — 74177 CT ABD & PELVIS W/CONTRAST: CPT | Mod: 26

## 2022-11-17 PROCEDURE — 74177 CT ABD & PELVIS W/CONTRAST: CPT

## 2022-11-17 PROCEDURE — 71260 CT THORAX DX C+: CPT

## 2022-11-18 ENCOUNTER — RESULT REVIEW (OUTPATIENT)
Age: 72
End: 2022-11-18

## 2022-11-18 ENCOUNTER — APPOINTMENT (OUTPATIENT)
Dept: INFUSION THERAPY | Facility: HOSPITAL | Age: 72
End: 2022-11-18

## 2022-11-18 ENCOUNTER — NON-APPOINTMENT (OUTPATIENT)
Age: 72
End: 2022-11-18

## 2022-11-18 LAB
ALBUMIN SERPL ELPH-MCNC: 4.3 G/DL — SIGNIFICANT CHANGE UP (ref 3.3–5)
ALP SERPL-CCNC: 93 U/L — SIGNIFICANT CHANGE UP (ref 40–120)
ALT FLD-CCNC: 11 U/L — SIGNIFICANT CHANGE UP (ref 10–45)
ANION GAP SERPL CALC-SCNC: 14 MMOL/L — SIGNIFICANT CHANGE UP (ref 5–17)
AST SERPL-CCNC: 25 U/L — SIGNIFICANT CHANGE UP (ref 10–40)
BASOPHILS # BLD AUTO: 0.07 K/UL — SIGNIFICANT CHANGE UP (ref 0–0.2)
BASOPHILS NFR BLD AUTO: 1 % — SIGNIFICANT CHANGE UP (ref 0–2)
BILIRUB SERPL-MCNC: 0.4 MG/DL — SIGNIFICANT CHANGE UP (ref 0.2–1.2)
BUN SERPL-MCNC: 19 MG/DL — SIGNIFICANT CHANGE UP (ref 7–23)
CALCIUM SERPL-MCNC: 9.1 MG/DL — SIGNIFICANT CHANGE UP (ref 8.4–10.5)
CHLORIDE SERPL-SCNC: 101 MMOL/L — SIGNIFICANT CHANGE UP (ref 96–108)
CO2 SERPL-SCNC: 22 MMOL/L — SIGNIFICANT CHANGE UP (ref 22–31)
CREAT SERPL-MCNC: 1.03 MG/DL — SIGNIFICANT CHANGE UP (ref 0.5–1.3)
EGFR: 78 ML/MIN/1.73M2 — SIGNIFICANT CHANGE UP
EOSINOPHIL # BLD AUTO: 0.66 K/UL — HIGH (ref 0–0.5)
EOSINOPHIL NFR BLD AUTO: 9.6 % — HIGH (ref 0–6)
GLUCOSE SERPL-MCNC: 85 MG/DL — SIGNIFICANT CHANGE UP (ref 70–99)
HCT VFR BLD CALC: 41.6 % — SIGNIFICANT CHANGE UP (ref 39–50)
HGB BLD-MCNC: 13.7 G/DL — SIGNIFICANT CHANGE UP (ref 13–17)
IMM GRANULOCYTES NFR BLD AUTO: 0.3 % — SIGNIFICANT CHANGE UP (ref 0–0.9)
LYMPHOCYTES # BLD AUTO: 1.43 K/UL — SIGNIFICANT CHANGE UP (ref 1–3.3)
LYMPHOCYTES # BLD AUTO: 20.8 % — SIGNIFICANT CHANGE UP (ref 13–44)
MCHC RBC-ENTMCNC: 31.2 PG — SIGNIFICANT CHANGE UP (ref 27–34)
MCHC RBC-ENTMCNC: 32.9 G/DL — SIGNIFICANT CHANGE UP (ref 32–36)
MCV RBC AUTO: 94.8 FL — SIGNIFICANT CHANGE UP (ref 80–100)
MONOCYTES # BLD AUTO: 0.57 K/UL — SIGNIFICANT CHANGE UP (ref 0–0.9)
MONOCYTES NFR BLD AUTO: 8.3 % — SIGNIFICANT CHANGE UP (ref 2–14)
NEUTROPHILS # BLD AUTO: 4.11 K/UL — SIGNIFICANT CHANGE UP (ref 1.8–7.4)
NEUTROPHILS NFR BLD AUTO: 60 % — SIGNIFICANT CHANGE UP (ref 43–77)
NRBC # BLD: 0 /100 WBCS — SIGNIFICANT CHANGE UP (ref 0–0)
PLATELET # BLD AUTO: 263 K/UL — SIGNIFICANT CHANGE UP (ref 150–400)
POTASSIUM SERPL-MCNC: 4.9 MMOL/L — SIGNIFICANT CHANGE UP (ref 3.5–5.3)
POTASSIUM SERPL-SCNC: 4.9 MMOL/L — SIGNIFICANT CHANGE UP (ref 3.5–5.3)
PROT SERPL-MCNC: 7.5 G/DL — SIGNIFICANT CHANGE UP (ref 6–8.3)
RBC # BLD: 4.39 M/UL — SIGNIFICANT CHANGE UP (ref 4.2–5.8)
RBC # FLD: 12.4 % — SIGNIFICANT CHANGE UP (ref 10.3–14.5)
SODIUM SERPL-SCNC: 137 MMOL/L — SIGNIFICANT CHANGE UP (ref 135–145)
TSH SERPL-MCNC: 2.45 UIU/ML — SIGNIFICANT CHANGE UP (ref 0.27–4.2)
WBC # BLD: 6.86 K/UL — SIGNIFICANT CHANGE UP (ref 3.8–10.5)
WBC # FLD AUTO: 6.86 K/UL — SIGNIFICANT CHANGE UP (ref 3.8–10.5)

## 2022-12-06 ENCOUNTER — OUTPATIENT (OUTPATIENT)
Dept: OUTPATIENT SERVICES | Facility: HOSPITAL | Age: 72
LOS: 1 days | Discharge: HOME | End: 2022-12-06

## 2022-12-06 DIAGNOSIS — C34.11 MALIGNANT NEOPLASM OF UPPER LOBE, RIGHT BRONCHUS OR LUNG: ICD-10-CM

## 2022-12-09 ENCOUNTER — RESULT REVIEW (OUTPATIENT)
Age: 72
End: 2022-12-09

## 2022-12-09 ENCOUNTER — APPOINTMENT (OUTPATIENT)
Dept: INFUSION THERAPY | Facility: HOSPITAL | Age: 72
End: 2022-12-09

## 2022-12-09 LAB
ALBUMIN SERPL ELPH-MCNC: 4.3 G/DL — SIGNIFICANT CHANGE UP (ref 3.3–5)
ALP SERPL-CCNC: 92 U/L — SIGNIFICANT CHANGE UP (ref 40–120)
ALT FLD-CCNC: 8 U/L — LOW (ref 10–45)
ANION GAP SERPL CALC-SCNC: 10 MMOL/L — SIGNIFICANT CHANGE UP (ref 5–17)
AST SERPL-CCNC: 18 U/L — SIGNIFICANT CHANGE UP (ref 10–40)
BASOPHILS # BLD AUTO: 0.11 K/UL — SIGNIFICANT CHANGE UP (ref 0–0.2)
BASOPHILS NFR BLD AUTO: 1.6 % — SIGNIFICANT CHANGE UP (ref 0–2)
BILIRUB SERPL-MCNC: 0.4 MG/DL — SIGNIFICANT CHANGE UP (ref 0.2–1.2)
BUN SERPL-MCNC: 17 MG/DL — SIGNIFICANT CHANGE UP (ref 7–23)
CALCIUM SERPL-MCNC: 9.3 MG/DL — SIGNIFICANT CHANGE UP (ref 8.4–10.5)
CHLORIDE SERPL-SCNC: 103 MMOL/L — SIGNIFICANT CHANGE UP (ref 96–108)
CO2 SERPL-SCNC: 24 MMOL/L — SIGNIFICANT CHANGE UP (ref 22–31)
CREAT SERPL-MCNC: 1.07 MG/DL — SIGNIFICANT CHANGE UP (ref 0.5–1.3)
EGFR: 75 ML/MIN/1.73M2 — SIGNIFICANT CHANGE UP
EOSINOPHIL # BLD AUTO: 0.73 K/UL — HIGH (ref 0–0.5)
EOSINOPHIL NFR BLD AUTO: 10.9 % — HIGH (ref 0–6)
GLUCOSE SERPL-MCNC: 86 MG/DL — SIGNIFICANT CHANGE UP (ref 70–99)
HCT VFR BLD CALC: 41.1 % — SIGNIFICANT CHANGE UP (ref 39–50)
HGB BLD-MCNC: 13.7 G/DL — SIGNIFICANT CHANGE UP (ref 13–17)
IMM GRANULOCYTES NFR BLD AUTO: 0.3 % — SIGNIFICANT CHANGE UP (ref 0–0.9)
LYMPHOCYTES # BLD AUTO: 1.59 K/UL — SIGNIFICANT CHANGE UP (ref 1–3.3)
LYMPHOCYTES # BLD AUTO: 23.8 % — SIGNIFICANT CHANGE UP (ref 13–44)
MCHC RBC-ENTMCNC: 31.4 PG — SIGNIFICANT CHANGE UP (ref 27–34)
MCHC RBC-ENTMCNC: 33.3 G/DL — SIGNIFICANT CHANGE UP (ref 32–36)
MCV RBC AUTO: 94.3 FL — SIGNIFICANT CHANGE UP (ref 80–100)
MONOCYTES # BLD AUTO: 0.6 K/UL — SIGNIFICANT CHANGE UP (ref 0–0.9)
MONOCYTES NFR BLD AUTO: 9 % — SIGNIFICANT CHANGE UP (ref 2–14)
NEUTROPHILS # BLD AUTO: 3.64 K/UL — SIGNIFICANT CHANGE UP (ref 1.8–7.4)
NEUTROPHILS NFR BLD AUTO: 54.4 % — SIGNIFICANT CHANGE UP (ref 43–77)
NRBC # BLD: 0 /100 WBCS — SIGNIFICANT CHANGE UP (ref 0–0)
PLATELET # BLD AUTO: 217 K/UL — SIGNIFICANT CHANGE UP (ref 150–400)
POTASSIUM SERPL-MCNC: 4.5 MMOL/L — SIGNIFICANT CHANGE UP (ref 3.5–5.3)
POTASSIUM SERPL-SCNC: 4.5 MMOL/L — SIGNIFICANT CHANGE UP (ref 3.5–5.3)
PROT SERPL-MCNC: 6.8 G/DL — SIGNIFICANT CHANGE UP (ref 6–8.3)
RBC # BLD: 4.36 M/UL — SIGNIFICANT CHANGE UP (ref 4.2–5.8)
RBC # FLD: 12.7 % — SIGNIFICANT CHANGE UP (ref 10.3–14.5)
SODIUM SERPL-SCNC: 136 MMOL/L — SIGNIFICANT CHANGE UP (ref 135–145)
TSH SERPL-MCNC: 2.19 UIU/ML — SIGNIFICANT CHANGE UP (ref 0.27–4.2)
WBC # BLD: 6.69 K/UL — SIGNIFICANT CHANGE UP (ref 3.8–10.5)
WBC # FLD AUTO: 6.69 K/UL — SIGNIFICANT CHANGE UP (ref 3.8–10.5)

## 2022-12-12 DIAGNOSIS — Z51.11 ENCOUNTER FOR ANTINEOPLASTIC CHEMOTHERAPY: ICD-10-CM

## 2022-12-30 ENCOUNTER — NON-APPOINTMENT (OUTPATIENT)
Age: 72
End: 2022-12-30

## 2022-12-30 ENCOUNTER — APPOINTMENT (OUTPATIENT)
Dept: HEMATOLOGY ONCOLOGY | Facility: CLINIC | Age: 72
End: 2022-12-30
Payer: COMMERCIAL

## 2022-12-30 ENCOUNTER — RESULT REVIEW (OUTPATIENT)
Age: 72
End: 2022-12-30

## 2022-12-30 ENCOUNTER — APPOINTMENT (OUTPATIENT)
Dept: INFUSION THERAPY | Facility: HOSPITAL | Age: 72
End: 2022-12-30

## 2022-12-30 ENCOUNTER — RX RENEWAL (OUTPATIENT)
Age: 72
End: 2022-12-30

## 2022-12-30 VITALS
HEIGHT: 70 IN | BODY MASS INDEX: 17.61 KG/M2 | WEIGHT: 123.02 LBS | HEART RATE: 67 BPM | RESPIRATION RATE: 16 BRPM | OXYGEN SATURATION: 99 % | TEMPERATURE: 97.3 F | DIASTOLIC BLOOD PRESSURE: 84 MMHG | SYSTOLIC BLOOD PRESSURE: 131 MMHG

## 2022-12-30 LAB
ALBUMIN SERPL ELPH-MCNC: 4.2 G/DL — SIGNIFICANT CHANGE UP (ref 3.3–5)
ALP SERPL-CCNC: 99 U/L — SIGNIFICANT CHANGE UP (ref 40–120)
ALT FLD-CCNC: 14 U/L — SIGNIFICANT CHANGE UP (ref 10–45)
ANION GAP SERPL CALC-SCNC: 12 MMOL/L — SIGNIFICANT CHANGE UP (ref 5–17)
AST SERPL-CCNC: 37 U/L — SIGNIFICANT CHANGE UP (ref 10–40)
BILIRUB SERPL-MCNC: 0.4 MG/DL — SIGNIFICANT CHANGE UP (ref 0.2–1.2)
BUN SERPL-MCNC: 20 MG/DL — SIGNIFICANT CHANGE UP (ref 7–23)
CALCIUM SERPL-MCNC: 9.1 MG/DL — SIGNIFICANT CHANGE UP (ref 8.4–10.5)
CHLORIDE SERPL-SCNC: 102 MMOL/L — SIGNIFICANT CHANGE UP (ref 96–108)
CO2 SERPL-SCNC: 23 MMOL/L — SIGNIFICANT CHANGE UP (ref 22–31)
CREAT SERPL-MCNC: 1 MG/DL — SIGNIFICANT CHANGE UP (ref 0.5–1.3)
EGFR: 81 ML/MIN/1.73M2 — SIGNIFICANT CHANGE UP
GLUCOSE SERPL-MCNC: 83 MG/DL — SIGNIFICANT CHANGE UP (ref 70–99)
HCT VFR BLD CALC: 44.5 % — SIGNIFICANT CHANGE UP (ref 39–50)
HGB BLD-MCNC: 14.6 G/DL — SIGNIFICANT CHANGE UP (ref 13–17)
MCHC RBC-ENTMCNC: 31.5 PG — SIGNIFICANT CHANGE UP (ref 27–34)
MCHC RBC-ENTMCNC: 32.8 GM/DL — SIGNIFICANT CHANGE UP (ref 32–36)
MCV RBC AUTO: 95.9 FL — SIGNIFICANT CHANGE UP (ref 80–100)
PLATELET # BLD AUTO: 283 K/UL — SIGNIFICANT CHANGE UP (ref 150–400)
POTASSIUM SERPL-MCNC: 6.4 MMOL/L — CRITICAL HIGH (ref 3.5–5.3)
POTASSIUM SERPL-SCNC: 6.4 MMOL/L — CRITICAL HIGH (ref 3.5–5.3)
PROT SERPL-MCNC: 7.4 G/DL — SIGNIFICANT CHANGE UP (ref 6–8.3)
RBC # BLD: 4.64 M/UL — SIGNIFICANT CHANGE UP (ref 4.2–5.8)
RBC # FLD: 12.7 % — SIGNIFICANT CHANGE UP (ref 10.3–14.5)
SODIUM SERPL-SCNC: 138 MMOL/L — SIGNIFICANT CHANGE UP (ref 135–145)
TSH SERPL-MCNC: 2.12 UIU/ML — SIGNIFICANT CHANGE UP (ref 0.27–4.2)
WBC # BLD: 7.33 K/UL — SIGNIFICANT CHANGE UP (ref 3.8–10.5)
WBC # FLD AUTO: 7.33 K/UL — SIGNIFICANT CHANGE UP (ref 3.8–10.5)

## 2022-12-30 PROCEDURE — 99214 OFFICE O/P EST MOD 30 MIN: CPT

## 2022-12-30 NOTE — ASSESSMENT
[Palliative] : Goals of care discussed with patient: Palliative [FreeTextEntry1] : 71 yo with newly diagnosed NSCLC stage IV . PDL1 was 80% and tumor NGS showed high TMB. Blood NGS through [facility] revealed- HRAS and TP53 mutations.\par Pt started with carbo/alimta/pembro as front line option on 11/6/2020. Completed 4 cycles wo irAEs. \par Now on Keytruda monotherapy as maintenance. Will continue on Keytruda q3w regimen. PET/CT 12/13/21 and April 2022 reviewed in detail personally and showed excellent response. I reviewed the most recent CT done in August with pt- this shows a stable 2.3 cm right upper lobe nodule corresponding to the patient's treated non-small cell lung cancer, as well as an additional stable 0.7 cm left upper lobe nodule and 1.3 cm right hilar lymphadenopathy. Most recent CT scans from November 2022 show stable treated RUL neoplasm, stable 1.2cm right hilar node and 7mm left upper lobe nodule. \par \par Pt will complete 2 years of maintenance Febuary 2023, after which we can stop tx as per current SOC. There is however, some evidence that continued tx may result in prolonged PFS and survival. We discussed this at length at todays visit and he opted to stop treatment after 2 years in February. \par We discussed the importance of close monitoring with labs, toxicity and efficacy assessment, while on this high risk medication (Keytruda). Pt verbalized understanding. \par Re-emphasized ongoing tobacco sesation. \par OV to match treatment appt in 6 weeks after repeat scans. \par \par \par \par

## 2022-12-30 NOTE — REVIEW OF SYSTEMS
[SOB on Exertion] : shortness of breath during exertion [Negative] : Heme/Lymph [Fever] : no fever [Fatigue] : no fatigue [Dysphagia] : no dysphagia [Chest Pain] : no chest pain [Shortness Of Breath] : no shortness of breath [Cough] : cough [Abdominal Pain] : no abdominal pain [Vomiting] : no vomiting [Diarrhea] : no diarrhea [FreeTextEntry6] : see above

## 2022-12-30 NOTE — HISTORY OF PRESENT ILLNESS
[Disease: _____________________] : Disease: [unfilled] [M: ___] : M[unfilled] [AJCC Stage: ____] : AJCC Stage: [unfilled] [de-identified] : 71 yo M with tobacco smoking hx and 911 exposure ( being followed at [facility],  was diagnosed with COPD/emphysema (dx soon after 9/11), had screening CT in 2019 which showed a lung mass. Work up at [facility]- solitary mass which was resected (benign-organizing PNA). He was referred to pulm grant a year ago by [facility] program but he made an appointment this year with Dr. Pritchard. He referred him for screening CT in Feb which was done in August and showed right upper lobe 3.7 x 3.8 cm mass with interval increase in size since January 7, 2019 worrisome for neoplasm possibly a primary lung neoplasm. Multiple bilateral smaller nodular opacities are new since January 7, 2019 may represent metastatic disease. Enlarged anterior mediastinal lymph node worrisome for metastatic disease. PET/CT 8/25/2020 IMPRESSION: Since outside PET/CT 1/30/2019: Considerable interval increased size and FDG avidity of a now spiculated right upper lobe mass with central necrosis suspicious for primary lung malignancy. New FDG avid enlarged mediastinal lymph nodes and bilateral pulmonary nodules probably metastatic disease. Indeterminate FDG avid left intraparotid focus, not well evaluated secondary to misregistration artifact, however appears new since prior PET/CT, possibly metastatic. He underwent CT guided bx by Dr. Barraza at [facility] which was suggestive of malignancy. He subsequently underwent bronch and EBUS/bx of level 4 LN which was c/w adeno ca, lung primary. He is still doing well from a symptomatic standpoint. He has lost some weight which he usually does every Summer. He denies fevers, chills, dyspnea, chest pain/tightness, cough, wheeze. Continues to work on a full-time basis as a . \par 10/27/20: pt has no new complaints. He had Ct scans and MRi of brain interval enlargement of multiple bilateral pulmonary masses and mediastinal adenopathy consistent with progressive disease. CT abdomen and MRI brain revealed no mets. \par \par 11/6/2020: Pt presents prior to starting treatment today to review regimen, address any concerns and provide written consent. He offers no new complaints. \par \par 11/27/2020: Pt returns for follow up. S/p first cycle. Tolerating well . NOT SMOKING!!!!! Doing well. Had one episode of near syncope (vaso-vagal). Pt states he was very dehydrated. No further episodes since. Pt states he "feels great"\par \par 12/18/2020: Pt returns for follow up. Tolerating treatment wo any AE. Only reports mild occasional fatigue. Scheduled for C#3 today\par \par 1/8/2021: Pt returns for follow up and discussion of findings on recent imaging. He is feeling well. Appetite good. Occasional fatigue. States he is losing his hair and is very unhappy about it. \par \par 1/14/21: Patient came to clinic for follow up today, he is s/p C4 Carbo/Alimta, Keytruda on 1/8/21, he reports worsening fatigue, poor appetite but stable weight, also reports  for past 2-3 days and  today, reports burning sensation feeling in chest that radiates to his throat and  nausea. Denies sob and skin rash. He did not take any BP meds today as his BP was in 100s He also experiences constipation. ECG was done in the office and showed some flip T waves in anterior leads. Plan to send him to [facility] to evaluate.\par \par 1/29/21: Pt had a prolonged recovery period after last treatment. Nausea, dysphagia, weight loss, and chest pain. As noted above, pt was hospitalized. No acute etiology was found and he was discharged home., He feels better now. Started eating again. CT scan in the hospital on 1/14 revealed  \par 1. Small right upper lobe pulmonary embolism. No evidence of pulmonary infarct or right heart strain.\par 2. Right upper lobe pulmonary mass and bilateral pulmonary nodules are unchanged from 01/05/2021.\par 3. There is extensive wall thickening in the proximal duodenum and within left upper quadrant small bowel loops, compatible with enteritis. Upper abdominal free fluid seen between the duodenum and pancreas and within the left upper quadrant, inferior to the pancreas, may be related to enteritis. Superimposed pancreatitis should be excluded clinically.\par 4. Free fluid in the right perinephric space, posterior-medial to the right kidney is new from 01/05/2021. Underlying genitourinary infection/pyelonephritis should be excluded clinically.\par \par He has lost a lot of weight since last visit. \par \par 2/19/21: Pt reports feeling much better. No ae from last treatment with monotherapy Keytruda. Pt has been seeing GI and cologard colon cancer assay was reported as positive and patient has been scheduled for colonoscopy on 3/19/21. No new complaints. \par \par 4/2/21: Pt returns for follow up. He is feeling well. States he had colonoscopy and endoscopy last week and "everything was normal." Tolerating treatment well. Offers no complaints. \par \par 4/23/21: Pt returns for follow up and discussion of findings on recent imaging. Offers no complaints. Feeling well. Scheduled for treatment today. \par \par 5/11/21: Pt feeling well, doing good on Keytruda. Weight is stable, has no complaints.\par \par 6/4/21: pt returns for f/u. Feels well. No irAE but pt describes extremely poor appetite. Losing weight. No other complaints\par \par 7/16/21:  Pt here for f/u. He is currently receiving  Keytruda monotherpy today with no irEAs. Pt denies SOB, rash, diarrhea, cough and itching.  He feels well and offers no complaints. Pt weight is stable and he verbalized increase appetite.  \par \par 8/27/21: Pt returns for follow up. He is feeling well but expresses concern about not being able to gain weight. No other concerns /complaints.\par \par 9/17/21:  Patient presented for f/u visit. Patient denies SOB, KENNEDY, diarrhea, rash and other irAEs.  Patient gained 1lb. Patient offers no other complaints and verbalized that he is feeling well.  \par \par 10/8/21: No complaints. \par \par 12/15/21: No complaints. No irAEs\par \par 1/28/22: Patient seen in treatment room for follow up. Reports doing well without any complaints. Denies worsening SOB, cough CP, abdominal pain, n/v/d, itching. Reports feeling generally well overall. \par \par 3/11/22: Pt has no symptoms to report. He is doing well without any irAEs\par \par 5/5/22: Pt seen via tele-visit for follow up. he has no new symptoms. Tolerating Keytruda without any irAEs. \par \par 7/15/22: Tolerating well with no AEs. Active and working. \par \par 8/26/22: Patient seen today in treatment room for follow up. He is doing well and tolerating Keytruda without AEs\par \par 10/29/22: Pt here for follow up. He is doing well with no AEs. Still smoking occasionally, due to peer influence at work. \par \par 12/30/2022: Seen today for follow up. Noted to have a minor cough and reports that is getting over a cold (not COVID). Overall feeling well. Continues to work and states he is not smoking any longer.  [de-identified] : adeno ca [de-identified] : PDL1 80% [FreeTextEntry1] : Maintenance Keytruda

## 2023-01-13 ENCOUNTER — APPOINTMENT (OUTPATIENT)
Dept: CARDIOLOGY | Facility: CLINIC | Age: 73
End: 2023-01-13
Payer: COMMERCIAL

## 2023-01-13 ENCOUNTER — APPOINTMENT (OUTPATIENT)
Dept: CARDIOLOGY | Facility: CLINIC | Age: 73
End: 2023-01-13

## 2023-01-13 DIAGNOSIS — Z51.12 ENCOUNTER FOR ANTINEOPLASTIC IMMUNOTHERAPY: ICD-10-CM

## 2023-01-13 DIAGNOSIS — R93.1 ABNORMAL FINDINGS ON DIAGNOSTIC IMAGING OF HEART AND CORONARY CIRCULATION: ICD-10-CM

## 2023-01-13 DIAGNOSIS — L81.4 OTHER MELANIN HYPERPIGMENTATION: ICD-10-CM

## 2023-01-13 PROCEDURE — 99215 OFFICE O/P EST HI 40 MIN: CPT | Mod: 95

## 2023-01-19 PROBLEM — L81.4 LENTIGINES: Status: ACTIVE | Noted: 2020-03-11

## 2023-01-19 NOTE — ASSESSMENT
[FreeTextEntry1] : 72 year old man with lung adenocarcinoma exhibiting excellent response to maintenance PD-1 inhibitor immunotherapy, HTN, atherosclerotic plaque, and history of pulmonary embolism.\par \par Echo images personally reviewed. Low normal LV systolic function, unchanged from prior. No EF or strain reported due to technical limitations per laboratory. Because images were unsuitable for strain imaging, we were unable to use this modality to monitor for subclinical/early immunotherapy related cardiotoxicity. He tolerated immunotherapy with minimal if any side effects to date. We discussed that late effects of immunotherapy are unknown. Will monitor with repeat troponin and BNP at this time and if any abnormalities are identified, a cardiac MRI will be obtained. Patient in agreement with this approach.\par \par Continue rosuvastatin for atherosclerotic plaque and immunotherapy.\par continue aspirin 81 mg daily\par needs updated lipid and a1c, troponin I, T, and BNP - ordered for 1/20/23 at treatment visit.\par Follow up in 6 months with me.\par \par Above recommendations discussed and all questions were answered to the best of my ability and to his apparent satisfaction.\par \par \par \par  \par \par

## 2023-01-19 NOTE — HISTORY OF PRESENT ILLNESS
[FreeTextEntry1] : This visit was conducted using real-time two way audio and video technology. The patient, JAIME MCDONALD, was located in [place], [place] at the time of the visit. The provider, César Padgett MD was located in [place], [place]. Verbal consent given on 01/13/2023 at  08:00 by JAIME MCDONALD.\par \par Interval History:\par Feels well. No palpitations. No new complaints. Tolerating maintenance immunotherapy and there are planned cycles remaining.\par His wife was diagnosed with breast cancer, so he returned to work to retain insurance benefits for her treatment.\par Medications reviewed and confirmed.\par Echo from July with no change from prior, but suboptimal study and GLS not performed.\par \par History:\par At oncology visit 1/14 reported chest discomfort and labile blood pressure with hypertension at home, noted to have new TWI on his ECG and sent to ER for workup. In ER Hs Annika was 27-25-21 ng/L (negative), BNP 5716 (elevated). ECG RBBB, chronic. CT-Angiogram showed new segmental PE, started on anticoagulation with enoxaparin and transitioned to apixaban.\par \par Also noted on the CT were coronary artery calcification and signs of colitis. He was prescribed a course of cipro/flagyl for this by the ER, which he feels did nothing. He denies any chest pain or new shortness of breath. He denies any dizziness, palpitations, syncope, or near syncope. He had mild esophagitis from PD-1 inhibitor initially, which resolved.\par \par He denies any history of heart disease but has right bundle branch block on the ECG, which is chronic. Work as a  causes him moderate stress, and he smoked until cancer diagnosis.\par \par He tells me he takes amlodipine for HTN, but is no longer taking HCTZ or losartan. No recent stress test.\par \par Compliant with apixaban, notes some ecchymoses.\par \par May 2021:\par \par He feels well and denies any new cardiac symptoms or complaints. He is taking medications as directed without any notable adverse effects. Continues on immunotherapy with PD-1 inhibitor, every 3 weeks, tolerating well. He smoked 1 or 2 cigarettes in the past month and has some second hand smoke exposure.\par \par No new chest pain, palps, dizziness/lightheadedness.\par \par Sept 10 2021\par Feels well. Continues on pembrolizumab plan for total 24 months. Good disease response to date. Still smoking, but less than 5 per week. No  chest pain, no palps, no change in ET. No bleeding. Dyspnea on exertion is stable. \par \par July 11, 2022\par Interval History: Feels great. Tells me he stopped smoking. Continue to experience dyspnea with exertion, relieved with nebulizers. Symptoms are stable. Has been taking apixaban only once a day.\par \par Cardiovascular Testing:\par ------------------------------------------------\par ECG:\par 7/11/2022: sinus w RBBB, no change from prior\par 1/14/2022: sinus with RBBB, unchanged from prior\par 9/10/2021: sinus rhythm 73 bpm, RBBB. Unchanged from prior\par 5/10/2021: sinus rhythm at 86 bpm with RBBB, unchanged from prior \par --------------------------------------------------\par Echo:\par 7/21/2022: mild MR, mild LV dysfunction, no pericardial effusion\par 2/08/2021: technically difficult, MAC, mild MR. Mild LV dysfunction. No pericardial effusion \par ---------------------------------------------------\par CT\par July : CT chest, abdomen with IV contrast: no aortic aneurysm and moderate proximal SMA stenosis \par

## 2023-01-19 NOTE — REASON FOR VISIT
[FreeTextEntry3] : Dr. Bell [FreeTextEntry1] : JAIME MCDONALD is a 72 year old man with WTC exposure (), smoker, hypertension, metastatic lung adenoCA s/p carbo/pemetrexed and pembrolizumab (11/2020-present), pulmonary embolism (Jan 2021), and coronary artery calcifications by CT seen for follow up.

## 2023-01-20 ENCOUNTER — RESULT REVIEW (OUTPATIENT)
Age: 73
End: 2023-01-20

## 2023-01-20 ENCOUNTER — APPOINTMENT (OUTPATIENT)
Dept: INFUSION THERAPY | Facility: HOSPITAL | Age: 73
End: 2023-01-20

## 2023-01-20 ENCOUNTER — APPOINTMENT (OUTPATIENT)
Dept: HEMATOLOGY ONCOLOGY | Facility: CLINIC | Age: 73
End: 2023-01-20
Payer: COMMERCIAL

## 2023-01-20 ENCOUNTER — APPOINTMENT (OUTPATIENT)
Dept: HEMATOLOGY ONCOLOGY | Facility: CLINIC | Age: 73
End: 2023-01-20

## 2023-01-20 LAB
A1C WITH ESTIMATED AVERAGE GLUCOSE RESULT: 5.5 % — SIGNIFICANT CHANGE UP (ref 4–5.6)
ALBUMIN SERPL ELPH-MCNC: 4.6 G/DL — SIGNIFICANT CHANGE UP (ref 3.3–5)
ALP SERPL-CCNC: 110 U/L — SIGNIFICANT CHANGE UP (ref 40–120)
ALT FLD-CCNC: 11 U/L — SIGNIFICANT CHANGE UP (ref 10–45)
ANION GAP SERPL CALC-SCNC: 12 MMOL/L — SIGNIFICANT CHANGE UP (ref 5–17)
AST SERPL-CCNC: 23 U/L — SIGNIFICANT CHANGE UP (ref 10–40)
BASOPHILS # BLD AUTO: 0.11 K/UL — SIGNIFICANT CHANGE UP (ref 0–0.2)
BASOPHILS NFR BLD AUTO: 1.6 % — SIGNIFICANT CHANGE UP (ref 0–2)
BILIRUB SERPL-MCNC: 0.4 MG/DL — SIGNIFICANT CHANGE UP (ref 0.2–1.2)
BUN SERPL-MCNC: 22 MG/DL — SIGNIFICANT CHANGE UP (ref 7–23)
CALCIUM SERPL-MCNC: 9.7 MG/DL — SIGNIFICANT CHANGE UP (ref 8.4–10.5)
CHLORIDE SERPL-SCNC: 104 MMOL/L — SIGNIFICANT CHANGE UP (ref 96–108)
CHOLEST SERPL-MCNC: 158 MG/DL — SIGNIFICANT CHANGE UP
CO2 SERPL-SCNC: 23 MMOL/L — SIGNIFICANT CHANGE UP (ref 22–31)
CREAT SERPL-MCNC: 1.15 MG/DL — SIGNIFICANT CHANGE UP (ref 0.5–1.3)
EGFR: 68 ML/MIN/1.73M2 — SIGNIFICANT CHANGE UP
EOSINOPHIL # BLD AUTO: 0.71 K/UL — HIGH (ref 0–0.5)
EOSINOPHIL NFR BLD AUTO: 10.2 % — HIGH (ref 0–6)
ESTIMATED AVERAGE GLUCOSE: 111 MG/DL — SIGNIFICANT CHANGE UP (ref 68–114)
GLUCOSE SERPL-MCNC: 85 MG/DL — SIGNIFICANT CHANGE UP (ref 70–99)
HCT VFR BLD CALC: 43.6 % — SIGNIFICANT CHANGE UP (ref 39–50)
HDLC SERPL-MCNC: 57 MG/DL — SIGNIFICANT CHANGE UP
HGB BLD-MCNC: 14.5 G/DL — SIGNIFICANT CHANGE UP (ref 13–17)
IMM GRANULOCYTES NFR BLD AUTO: 0.3 % — SIGNIFICANT CHANGE UP (ref 0–0.9)
LIPID PNL WITH DIRECT LDL SERPL: 89 MG/DL — SIGNIFICANT CHANGE UP
LYMPHOCYTES # BLD AUTO: 1.62 K/UL — SIGNIFICANT CHANGE UP (ref 1–3.3)
LYMPHOCYTES # BLD AUTO: 23.3 % — SIGNIFICANT CHANGE UP (ref 13–44)
MCHC RBC-ENTMCNC: 31.3 PG — SIGNIFICANT CHANGE UP (ref 27–34)
MCHC RBC-ENTMCNC: 33.3 G/DL — SIGNIFICANT CHANGE UP (ref 32–36)
MCV RBC AUTO: 94.2 FL — SIGNIFICANT CHANGE UP (ref 80–100)
MONOCYTES # BLD AUTO: 0.63 K/UL — SIGNIFICANT CHANGE UP (ref 0–0.9)
MONOCYTES NFR BLD AUTO: 9.1 % — SIGNIFICANT CHANGE UP (ref 2–14)
NEUTROPHILS # BLD AUTO: 3.86 K/UL — SIGNIFICANT CHANGE UP (ref 1.8–7.4)
NEUTROPHILS NFR BLD AUTO: 55.5 % — SIGNIFICANT CHANGE UP (ref 43–77)
NON HDL CHOLESTEROL: 101 MG/DL — SIGNIFICANT CHANGE UP
NRBC # BLD: 0 /100 WBCS — SIGNIFICANT CHANGE UP (ref 0–0)
NT-PROBNP SERPL-SCNC: 364 PG/ML — HIGH (ref 0–300)
PLATELET # BLD AUTO: 257 K/UL — SIGNIFICANT CHANGE UP (ref 150–400)
POTASSIUM SERPL-MCNC: 5.4 MMOL/L — HIGH (ref 3.5–5.3)
POTASSIUM SERPL-SCNC: 5.4 MMOL/L — HIGH (ref 3.5–5.3)
PROT SERPL-MCNC: 7.7 G/DL — SIGNIFICANT CHANGE UP (ref 6–8.3)
RBC # BLD: 4.63 M/UL — SIGNIFICANT CHANGE UP (ref 4.2–5.8)
RBC # FLD: 12.4 % — SIGNIFICANT CHANGE UP (ref 10.3–14.5)
SODIUM SERPL-SCNC: 139 MMOL/L — SIGNIFICANT CHANGE UP (ref 135–145)
TRIGL SERPL-MCNC: 61 MG/DL — SIGNIFICANT CHANGE UP
TROPONIN T, HIGH SENSITIVITY RESULT: 17 NG/L — SIGNIFICANT CHANGE UP (ref 0–51)
TSH SERPL-MCNC: 1.75 UIU/ML — SIGNIFICANT CHANGE UP (ref 0.27–4.2)
WBC # BLD: 6.95 K/UL — SIGNIFICANT CHANGE UP (ref 3.8–10.5)
WBC # FLD AUTO: 6.95 K/UL — SIGNIFICANT CHANGE UP (ref 3.8–10.5)

## 2023-01-20 PROCEDURE — 99214 OFFICE O/P EST MOD 30 MIN: CPT

## 2023-01-20 NOTE — REVIEW OF SYSTEMS
[Fever] : no fever [Fatigue] : no fatigue [Dysphagia] : no dysphagia [Chest Pain] : no chest pain [Shortness Of Breath] : no shortness of breath [SOB on Exertion] : shortness of breath during exertion [Abdominal Pain] : no abdominal pain [Vomiting] : no vomiting [Diarrhea] : no diarrhea [Negative] : Heme/Lymph

## 2023-01-20 NOTE — ASSESSMENT
[FreeTextEntry1] : 71 yo with newly diagnosed NSCLC stage IV . PDL1 was 80% and tumor NGS showed high TMB. Blood NGS through [facility] revealed- HRAS and TP53 mutations.\par Pt started with carbo/alimta/pembro as front line option on 11/6/2020. Completed 4 cycles wo irAEs. \par Now on Keytruda monotherapy as maintenance. Will continue on Keytruda q3w regimen. PET/CT 12/13/21 and April 2022 reviewed in detail personally and showed excellent response. I reviewed the most recent CT done in August with pt- this shows a stable 2.3 cm right upper lobe nodule corresponding to the patient's treated non-small cell lung cancer, as well as an additional stable 0.7 cm left upper lobe nodule and 1.3 cm right hilar lymphadenopathy. Most recent CT scans from November 2022 show stable treated RUL neoplasm, stable 1.2cm right hilar node and 7mm left upper lobe nodule. \par \par Pt will complete 2 years of maintenance Febuary 10th 2023, after which we can stop tx as per current SOC. There is however, some evidence that continued tx may result in prolonged PFS and survival. We discussed this at length and he opted to stop treatment after 2 years in February. \par Repeat CT scans due and ordered to be done prior to next visit \par We discussed the importance of close monitoring with labs, toxicity and efficacy assessment, while on this high risk medication (Keytruda). Pt verbalized understanding. \par CAD: Recently saw cardiologist Dr. Luna who requested troponin, BNP, lipids, and hgbA1c to be done today. Orders placed. \par Re-emphasized ongoing tobacco sesation. \par OV to match treatment appt in 3 weeks after repeat scans. \par \par \par \par  [Palliative] : Goals of care discussed with patient: Palliative

## 2023-01-20 NOTE — HISTORY OF PRESENT ILLNESS
[Disease: _____________________] : Disease: [unfilled] [M: ___] : M[unfilled] [AJCC Stage: ____] : AJCC Stage: [unfilled] [de-identified] : 73 yo M with tobacco smoking hx and 911 exposure ( being followed at [facility],  was diagnosed with COPD/emphysema (dx soon after 9/11), had screening CT in 2019 which showed a lung mass. Work up at [facility]- solitary mass which was resected (benign-organizing PNA). He was referred to pulm grant a year ago by [facility] program but he made an appointment this year with Dr. Pritchard. He referred him for screening CT in Feb which was done in August and showed right upper lobe 3.7 x 3.8 cm mass with interval increase in size since January 7, 2019 worrisome for neoplasm possibly a primary lung neoplasm. Multiple bilateral smaller nodular opacities are new since January 7, 2019 may represent metastatic disease. Enlarged anterior mediastinal lymph node worrisome for metastatic disease. PET/CT 8/25/2020 IMPRESSION: Since outside PET/CT 1/30/2019: Considerable interval increased size and FDG avidity of a now spiculated right upper lobe mass with central necrosis suspicious for primary lung malignancy. New FDG avid enlarged mediastinal lymph nodes and bilateral pulmonary nodules probably metastatic disease. Indeterminate FDG avid left intraparotid focus, not well evaluated secondary to misregistration artifact, however appears new since prior PET/CT, possibly metastatic. He underwent CT guided bx by Dr. Barraza at [facility] which was suggestive of malignancy. He subsequently underwent bronch and EBUS/bx of level 4 LN which was c/w adeno ca, lung primary. He is still doing well from a symptomatic standpoint. He has lost some weight which he usually does every Summer. He denies fevers, chills, dyspnea, chest pain/tightness, cough, wheeze. Continues to work on a full-time basis as a . \par 10/27/20: pt has no new complaints. He had Ct scans and MRi of brain interval enlargement of multiple bilateral pulmonary masses and mediastinal adenopathy consistent with progressive disease. CT abdomen and MRI brain revealed no mets. \par \par 11/6/2020: Pt presents prior to starting treatment today to review regimen, address any concerns and provide written consent. He offers no new complaints. \par \par 11/27/2020: Pt returns for follow up. S/p first cycle. Tolerating well . NOT SMOKING!!!!! Doing well. Had one episode of near syncope (vaso-vagal). Pt states he was very dehydrated. No further episodes since. Pt states he "feels great"\par \par 12/18/2020: Pt returns for follow up. Tolerating treatment wo any AE. Only reports mild occasional fatigue. Scheduled for C#3 today\par \par 1/8/2021: Pt returns for follow up and discussion of findings on recent imaging. He is feeling well. Appetite good. Occasional fatigue. States he is losing his hair and is very unhappy about it. \par \par 1/14/21: Patient came to clinic for follow up today, he is s/p C4 Carbo/Alimta, Keytruda on 1/8/21, he reports worsening fatigue, poor appetite but stable weight, also reports  for past 2-3 days and  today, reports burning sensation feeling in chest that radiates to his throat and  nausea. Denies sob and skin rash. He did not take any BP meds today as his BP was in 100s He also experiences constipation. ECG was done in the office and showed some flip T waves in anterior leads. Plan to send him to [facility] to evaluate.\par \par 1/29/21: Pt had a prolonged recovery period after last treatment. Nausea, dysphagia, weight loss, and chest pain. As noted above, pt was hospitalized. No acute etiology was found and he was discharged home., He feels better now. Started eating again. CT scan in the hospital on 1/14 revealed  \par 1. Small right upper lobe pulmonary embolism. No evidence of pulmonary infarct or right heart strain.\par 2. Right upper lobe pulmonary mass and bilateral pulmonary nodules are unchanged from 01/05/2021.\par 3. There is extensive wall thickening in the proximal duodenum and within left upper quadrant small bowel loops, compatible with enteritis. Upper abdominal free fluid seen between the duodenum and pancreas and within the left upper quadrant, inferior to the pancreas, may be related to enteritis. Superimposed pancreatitis should be excluded clinically.\par 4. Free fluid in the right perinephric space, posterior-medial to the right kidney is new from 01/05/2021. Underlying genitourinary infection/pyelonephritis should be excluded clinically.\par \par He has lost a lot of weight since last visit. \par \par 2/19/21: Pt reports feeling much better. No ae from last treatment with monotherapy Keytruda. Pt has been seeing GI and cologard colon cancer assay was reported as positive and patient has been scheduled for colonoscopy on 3/19/21. No new complaints. \par \par 4/2/21: Pt returns for follow up. He is feeling well. States he had colonoscopy and endoscopy last week and "everything was normal." Tolerating treatment well. Offers no complaints. \par \par 4/23/21: Pt returns for follow up and discussion of findings on recent imaging. Offers no complaints. Feeling well. Scheduled for treatment today. \par \par 5/11/21: Pt feeling well, doing good on Keytruda. Weight is stable, has no complaints.\par \par 6/4/21: pt returns for f/u. Feels well. No irAE but pt describes extremely poor appetite. Losing weight. No other complaints\par \par 7/16/21:  Pt here for f/u. He is currently receiving  Keytruda monotherpy today with no irEAs. Pt denies SOB, rash, diarrhea, cough and itching.  He feels well and offers no complaints. Pt weight is stable and he verbalized increase appetite.  \par \par 8/27/21: Pt returns for follow up. He is feeling well but expresses concern about not being able to gain weight. No other concerns /complaints.\par \par 9/17/21:  Patient presented for f/u visit. Patient denies SOB, KENNEDY, diarrhea, rash and other irAEs.  Patient gained 1lb. Patient offers no other complaints and verbalized that he is feeling well.  \par \par 10/8/21: No complaints. \par \par 12/15/21: No complaints. No irAEs\par \par 1/28/22: Patient seen in treatment room for follow up. Reports doing well without any complaints. Denies worsening SOB, cough CP, abdominal pain, n/v/d, itching. Reports feeling generally well overall. \par \par 3/11/22: Pt has no symptoms to report. He is doing well without any irAEs\par \par 5/5/22: Pt seen via tele-visit for follow up. he has no new symptoms. Tolerating Keytruda without any irAEs. \par \par 7/15/22: Tolerating well with no AEs. Active and working. \par \par 8/26/22: Patient seen today in treatment room for follow up. He is doing well and tolerating Keytruda without AEs\par \par 10/29/22: Pt here for follow up. He is doing well with no AEs. Still smoking occasionally, due to peer influence at work. \par \par 12/30/2022: Seen today for follow up. Noted to have a minor cough and reports that is getting over a cold (not COVID). Overall feeling well. Continues to work and states he is not smoking any longer. \par \par 1/20/2022: Patient seen today in treatment room for follow up. Doing well. Last treatment scheduled for Feb 10th 2023 [de-identified] : adeno ca [de-identified] : PDL1 80% [FreeTextEntry1] : Maintenance Keytruda

## 2023-02-01 ENCOUNTER — OUTPATIENT (OUTPATIENT)
Dept: OUTPATIENT SERVICES | Facility: HOSPITAL | Age: 73
LOS: 1 days | End: 2023-02-01
Payer: COMMERCIAL

## 2023-02-01 ENCOUNTER — APPOINTMENT (OUTPATIENT)
Dept: CT IMAGING | Facility: CLINIC | Age: 73
End: 2023-02-01
Payer: COMMERCIAL

## 2023-02-01 DIAGNOSIS — Z98.890 OTHER SPECIFIED POSTPROCEDURAL STATES: Chronic | ICD-10-CM

## 2023-02-01 DIAGNOSIS — Z00.8 ENCOUNTER FOR OTHER GENERAL EXAMINATION: ICD-10-CM

## 2023-02-01 PROCEDURE — 71260 CT THORAX DX C+: CPT

## 2023-02-01 PROCEDURE — 74177 CT ABD & PELVIS W/CONTRAST: CPT

## 2023-02-01 PROCEDURE — 74177 CT ABD & PELVIS W/CONTRAST: CPT | Mod: 26

## 2023-02-01 PROCEDURE — 71260 CT THORAX DX C+: CPT | Mod: 26

## 2023-02-03 ENCOUNTER — OUTPATIENT (OUTPATIENT)
Dept: OUTPATIENT SERVICES | Facility: HOSPITAL | Age: 73
LOS: 1 days | Discharge: HOME | End: 2023-02-03

## 2023-02-03 DIAGNOSIS — C34.11 MALIGNANT NEOPLASM OF UPPER LOBE, RIGHT BRONCHUS OR LUNG: ICD-10-CM

## 2023-02-03 DIAGNOSIS — Z98.890 OTHER SPECIFIED POSTPROCEDURAL STATES: Chronic | ICD-10-CM

## 2023-02-10 ENCOUNTER — RESULT REVIEW (OUTPATIENT)
Age: 73
End: 2023-02-10

## 2023-02-10 ENCOUNTER — APPOINTMENT (OUTPATIENT)
Dept: INFUSION THERAPY | Facility: HOSPITAL | Age: 73
End: 2023-02-10

## 2023-02-10 ENCOUNTER — APPOINTMENT (OUTPATIENT)
Dept: HEMATOLOGY ONCOLOGY | Facility: CLINIC | Age: 73
End: 2023-02-10
Payer: COMMERCIAL

## 2023-02-10 LAB
ALBUMIN SERPL ELPH-MCNC: 4.4 G/DL — SIGNIFICANT CHANGE UP (ref 3.3–5)
ALP SERPL-CCNC: 102 U/L — SIGNIFICANT CHANGE UP (ref 40–120)
ALT FLD-CCNC: 23 U/L — SIGNIFICANT CHANGE UP (ref 10–45)
ANION GAP SERPL CALC-SCNC: 13 MMOL/L — SIGNIFICANT CHANGE UP (ref 5–17)
AST SERPL-CCNC: 35 U/L — SIGNIFICANT CHANGE UP (ref 10–40)
BASOPHILS # BLD AUTO: 0.08 K/UL — SIGNIFICANT CHANGE UP (ref 0–0.2)
BASOPHILS NFR BLD AUTO: 1.3 % — SIGNIFICANT CHANGE UP (ref 0–2)
BILIRUB SERPL-MCNC: 0.4 MG/DL — SIGNIFICANT CHANGE UP (ref 0.2–1.2)
BUN SERPL-MCNC: 20 MG/DL — SIGNIFICANT CHANGE UP (ref 7–23)
CALCIUM SERPL-MCNC: 9.6 MG/DL — SIGNIFICANT CHANGE UP (ref 8.4–10.5)
CHLORIDE SERPL-SCNC: 101 MMOL/L — SIGNIFICANT CHANGE UP (ref 96–108)
CO2 SERPL-SCNC: 22 MMOL/L — SIGNIFICANT CHANGE UP (ref 22–31)
CREAT SERPL-MCNC: 1.17 MG/DL — SIGNIFICANT CHANGE UP (ref 0.5–1.3)
EGFR: 67 ML/MIN/1.73M2 — SIGNIFICANT CHANGE UP
EOSINOPHIL # BLD AUTO: 0.47 K/UL — SIGNIFICANT CHANGE UP (ref 0–0.5)
EOSINOPHIL NFR BLD AUTO: 7.8 % — HIGH (ref 0–6)
GLUCOSE SERPL-MCNC: 91 MG/DL — SIGNIFICANT CHANGE UP (ref 70–99)
HCT VFR BLD CALC: 44.9 % — SIGNIFICANT CHANGE UP (ref 39–50)
HGB BLD-MCNC: 14.9 G/DL — SIGNIFICANT CHANGE UP (ref 13–17)
IMM GRANULOCYTES NFR BLD AUTO: 0.3 % — SIGNIFICANT CHANGE UP (ref 0–0.9)
LYMPHOCYTES # BLD AUTO: 1.61 K/UL — SIGNIFICANT CHANGE UP (ref 1–3.3)
LYMPHOCYTES # BLD AUTO: 26.8 % — SIGNIFICANT CHANGE UP (ref 13–44)
MCHC RBC-ENTMCNC: 31 PG — SIGNIFICANT CHANGE UP (ref 27–34)
MCHC RBC-ENTMCNC: 33.2 G/DL — SIGNIFICANT CHANGE UP (ref 32–36)
MCV RBC AUTO: 93.5 FL — SIGNIFICANT CHANGE UP (ref 80–100)
MONOCYTES # BLD AUTO: 0.41 K/UL — SIGNIFICANT CHANGE UP (ref 0–0.9)
MONOCYTES NFR BLD AUTO: 6.8 % — SIGNIFICANT CHANGE UP (ref 2–14)
NEUTROPHILS # BLD AUTO: 3.42 K/UL — SIGNIFICANT CHANGE UP (ref 1.8–7.4)
NEUTROPHILS NFR BLD AUTO: 57 % — SIGNIFICANT CHANGE UP (ref 43–77)
NRBC # BLD: 0 /100 WBCS — SIGNIFICANT CHANGE UP (ref 0–0)
PLATELET # BLD AUTO: 243 K/UL — SIGNIFICANT CHANGE UP (ref 150–400)
POTASSIUM SERPL-MCNC: 4.6 MMOL/L — SIGNIFICANT CHANGE UP (ref 3.5–5.3)
POTASSIUM SERPL-SCNC: 4.6 MMOL/L — SIGNIFICANT CHANGE UP (ref 3.5–5.3)
PROT SERPL-MCNC: 7.3 G/DL — SIGNIFICANT CHANGE UP (ref 6–8.3)
RAPID RVP RESULT: SIGNIFICANT CHANGE UP
RBC # BLD: 4.8 M/UL — SIGNIFICANT CHANGE UP (ref 4.2–5.8)
RBC # FLD: 12 % — SIGNIFICANT CHANGE UP (ref 10.3–14.5)
SARS-COV-2 RNA SPEC QL NAA+PROBE: SIGNIFICANT CHANGE UP
SODIUM SERPL-SCNC: 136 MMOL/L — SIGNIFICANT CHANGE UP (ref 135–145)
TSH SERPL-MCNC: 4.95 UIU/ML — HIGH (ref 0.27–4.2)
WBC # BLD: 6.01 K/UL — SIGNIFICANT CHANGE UP (ref 3.8–10.5)
WBC # FLD AUTO: 6.01 K/UL — SIGNIFICANT CHANGE UP (ref 3.8–10.5)

## 2023-02-10 PROCEDURE — 99215 OFFICE O/P EST HI 40 MIN: CPT

## 2023-02-10 RX ORDER — AMLODIPINE BESYLATE 2.5 MG/1
1 TABLET ORAL
Qty: 0 | Refills: 0 | DISCHARGE

## 2023-02-10 NOTE — ASSESSMENT
[Palliative] : Goals of care discussed with patient: Palliative [FreeTextEntry1] : 71 yo with newly diagnosed NSCLC stage IV . PDL1 was 80% and tumor NGS showed high TMB. Blood NGS through [facility] revealed- HRAS and TP53 mutations.\par Pt started with carbo/alimta/pembro as front line option on 11/6/2020. Completed 4 cycles wo irAEs. \par Now on Keytruda monotherapy as maintenance. Will continue on Keytruda q3w regimen. PET/CT 12/13/21 and April 2022 reviewed in detail personally and showed excellent response. I reviewed the most recent CT done in August with pt- this shows a stable 2.3 cm right upper lobe nodule corresponding to the patient's treated non-small cell lung cancer, as well as an additional stable 0.7 cm left upper lobe nodule and 1.3 cm right hilar lymphadenopathy. Most recent CT scans from November 2022 show stable treated RUL neoplasm, stable 1.2cm right hilar node and 7mm left upper lobe nodule. \par \par Pt will complete 2 years of maintenance Febuary 10th 2023, after which we can stop tx as per current SOC. There is however, some evidence that continued tx may result in prolonged PFS and survival. We discussed this at length and he opted to stop treatment after 2 years in February. \par CT scan done in Jan 2023 shows stable findings except for a micronodule, in the TI- could be inflammatory since pt started having URI/LRI symptoms immediately after this. \par CAD: Recently saw cardiologist Dr. Luna who requested troponin,reviewed all these labs and noted to be OK. BNP declining\par Re-emphasized ongoing tobacco cessation. \par OV in 3 months. after new scans\par Flu-like symptoms- will send COVID and RVP swab\par Zithro course \par CXR roday\par \par \par

## 2023-02-10 NOTE — REVIEW OF SYSTEMS
[Cough] : cough [SOB on Exertion] : shortness of breath during exertion [Negative] : Heme/Lymph [Fever] : no fever [Fatigue] : no fatigue [Dysphagia] : no dysphagia [Chest Pain] : no chest pain [Shortness Of Breath] : no shortness of breath [Abdominal Pain] : no abdominal pain [Vomiting] : no vomiting [Diarrhea] : no diarrhea [FreeTextEntry4] : runny nose

## 2023-02-10 NOTE — HISTORY OF PRESENT ILLNESS
[Disease: _____________________] : Disease: [unfilled] [M: ___] : M[unfilled] [AJCC Stage: ____] : AJCC Stage: [unfilled] [de-identified] : 71 yo M with tobacco smoking hx and 911 exposure ( being followed at [facility],  was diagnosed with COPD/emphysema (dx soon after 9/11), had screening CT in 2019 which showed a lung mass. Work up at [facility]- solitary mass which was resected (benign-organizing PNA). He was referred to pulm grant a year ago by [facility] program but he made an appointment this year with Dr. Pritchard. He referred him for screening CT in Feb which was done in August and showed right upper lobe 3.7 x 3.8 cm mass with interval increase in size since January 7, 2019 worrisome for neoplasm possibly a primary lung neoplasm. Multiple bilateral smaller nodular opacities are new since January 7, 2019 may represent metastatic disease. Enlarged anterior mediastinal lymph node worrisome for metastatic disease. PET/CT 8/25/2020 IMPRESSION: Since outside PET/CT 1/30/2019: Considerable interval increased size and FDG avidity of a now spiculated right upper lobe mass with central necrosis suspicious for primary lung malignancy. New FDG avid enlarged mediastinal lymph nodes and bilateral pulmonary nodules probably metastatic disease. Indeterminate FDG avid left intraparotid focus, not well evaluated secondary to misregistration artifact, however appears new since prior PET/CT, possibly metastatic. He underwent CT guided bx by Dr. Barraza at [facility] which was suggestive of malignancy. He subsequently underwent bronch and EBUS/bx of level 4 LN which was c/w adeno ca, lung primary. He is still doing well from a symptomatic standpoint. He has lost some weight which he usually does every Summer. He denies fevers, chills, dyspnea, chest pain/tightness, cough, wheeze. Continues to work on a full-time basis as a . \par 10/27/20: pt has no new complaints. He had Ct scans and MRi of brain interval enlargement of multiple bilateral pulmonary masses and mediastinal adenopathy consistent with progressive disease. CT abdomen and MRI brain revealed no mets. \par \par 11/6/2020: Pt presents prior to starting treatment today to review regimen, address any concerns and provide written consent. He offers no new complaints. \par \par 11/27/2020: Pt returns for follow up. S/p first cycle. Tolerating well . NOT SMOKING!!!!! Doing well. Had one episode of near syncope (vaso-vagal). Pt states he was very dehydrated. No further episodes since. Pt states he "feels great"\par \par 12/18/2020: Pt returns for follow up. Tolerating treatment wo any AE. Only reports mild occasional fatigue. Scheduled for C#3 today\par \par 1/8/2021: Pt returns for follow up and discussion of findings on recent imaging. He is feeling well. Appetite good. Occasional fatigue. States he is losing his hair and is very unhappy about it. \par \par 1/14/21: Patient came to clinic for follow up today, he is s/p C4 Carbo/Alimta, Keytruda on 1/8/21, he reports worsening fatigue, poor appetite but stable weight, also reports  for past 2-3 days and  today, reports burning sensation feeling in chest that radiates to his throat and  nausea. Denies sob and skin rash. He did not take any BP meds today as his BP was in 100s He also experiences constipation. ECG was done in the office and showed some flip T waves in anterior leads. Plan to send him to [facility] to evaluate.\par \par 1/29/21: Pt had a prolonged recovery period after last treatment. Nausea, dysphagia, weight loss, and chest pain. As noted above, pt was hospitalized. No acute etiology was found and he was discharged home., He feels better now. Started eating again. CT scan in the hospital on 1/14 revealed  \par 1. Small right upper lobe pulmonary embolism. No evidence of pulmonary infarct or right heart strain.\par 2. Right upper lobe pulmonary mass and bilateral pulmonary nodules are unchanged from 01/05/2021.\par 3. There is extensive wall thickening in the proximal duodenum and within left upper quadrant small bowel loops, compatible with enteritis. Upper abdominal free fluid seen between the duodenum and pancreas and within the left upper quadrant, inferior to the pancreas, may be related to enteritis. Superimposed pancreatitis should be excluded clinically.\par 4. Free fluid in the right perinephric space, posterior-medial to the right kidney is new from 01/05/2021. Underlying genitourinary infection/pyelonephritis should be excluded clinically.\par \par He has lost a lot of weight since last visit. \par \par 2/19/21: Pt reports feeling much better. No ae from last treatment with monotherapy Keytruda. Pt has been seeing GI and cologard colon cancer assay was reported as positive and patient has been scheduled for colonoscopy on 3/19/21. No new complaints. \par \par 4/2/21: Pt returns for follow up. He is feeling well. States he had colonoscopy and endoscopy last week and "everything was normal." Tolerating treatment well. Offers no complaints. \par \par 4/23/21: Pt returns for follow up and discussion of findings on recent imaging. Offers no complaints. Feeling well. Scheduled for treatment today. \par \par 5/11/21: Pt feeling well, doing good on Keytruda. Weight is stable, has no complaints.\par \par 6/4/21: pt returns for f/u. Feels well. No irAE but pt describes extremely poor appetite. Losing weight. No other complaints\par \par 7/16/21:  Pt here for f/u. He is currently receiving  Keytruda monotherpy today with no irEAs. Pt denies SOB, rash, diarrhea, cough and itching.  He feels well and offers no complaints. Pt weight is stable and he verbalized increase appetite.  \par \par 8/27/21: Pt returns for follow up. He is feeling well but expresses concern about not being able to gain weight. No other concerns /complaints.\par \par 9/17/21:  Patient presented for f/u visit. Patient denies SOB, KENNEDY, diarrhea, rash and other irAEs.  Patient gained 1lb. Patient offers no other complaints and verbalized that he is feeling well.  \par \par 10/8/21: No complaints. \par \par 12/15/21: No complaints. No irAEs\par \par 1/28/22: Patient seen in treatment room for follow up. Reports doing well without any complaints. Denies worsening SOB, cough CP, abdominal pain, n/v/d, itching. Reports feeling generally well overall. \par \par 3/11/22: Pt has no symptoms to report. He is doing well without any irAEs\par \par 5/5/22: Pt seen via tele-visit for follow up. he has no new symptoms. Tolerating Keytruda without any irAEs. \par \par 7/15/22: Tolerating well with no AEs. Active and working. \par \par 8/26/22: Patient seen today in treatment room for follow up. He is doing well and tolerating Keytruda without AEs\par \par 10/29/22: Pt here for follow up. He is doing well with no AEs. Still smoking occasionally, due to peer influence at work. \par \par 12/30/2022: Seen today for follow up. Noted to have a minor cough and reports that is getting over a cold (not COVID). Overall feeling well. Continues to work and states he is not smoking any longer. \par \par 1/20/2022: Patient seen today in treatment room for follow up. Doing well. Last treatment scheduled for Feb 10th 2023\par \par 2/10/23: GEtting over a cold, has some cough. Tested for COVID at home, was negative per patient.  [de-identified] : adeno ca [de-identified] : PDL1 80% [FreeTextEntry1] : Maintenance Keytruda

## 2023-02-10 NOTE — PHYSICAL EXAM
[Fully active, able to carry on all pre-disease performance without restriction] : Status 0 - Fully active, able to carry on all pre-disease performance without restriction [Thin] : thin [Normal] : affect appropriate [de-identified] : rt basal crackles

## 2023-02-13 ENCOUNTER — OUTPATIENT (OUTPATIENT)
Dept: OUTPATIENT SERVICES | Facility: HOSPITAL | Age: 73
LOS: 1 days | End: 2023-02-13
Payer: COMMERCIAL

## 2023-02-13 ENCOUNTER — APPOINTMENT (OUTPATIENT)
Dept: CT IMAGING | Facility: IMAGING CENTER | Age: 73
End: 2023-02-13

## 2023-02-13 ENCOUNTER — APPOINTMENT (OUTPATIENT)
Dept: RADIOLOGY | Facility: IMAGING CENTER | Age: 73
End: 2023-02-13

## 2023-02-13 DIAGNOSIS — R91.1 SOLITARY PULMONARY NODULE: ICD-10-CM

## 2023-02-13 DIAGNOSIS — C34.90 MALIGNANT NEOPLASM OF UNSPECIFIED PART OF UNSPECIFIED BRONCHUS OR LUNG: ICD-10-CM

## 2023-02-13 DIAGNOSIS — Z51.11 ENCOUNTER FOR ANTINEOPLASTIC CHEMOTHERAPY: ICD-10-CM

## 2023-02-13 DIAGNOSIS — Z98.890 OTHER SPECIFIED POSTPROCEDURAL STATES: Chronic | ICD-10-CM

## 2023-02-13 PROCEDURE — 71046 X-RAY EXAM CHEST 2 VIEWS: CPT

## 2023-03-01 ENCOUNTER — RX RENEWAL (OUTPATIENT)
Age: 73
End: 2023-03-01

## 2023-03-08 ENCOUNTER — NON-APPOINTMENT (OUTPATIENT)
Age: 73
End: 2023-03-08

## 2023-03-09 ENCOUNTER — OUTPATIENT (OUTPATIENT)
Dept: OUTPATIENT SERVICES | Facility: HOSPITAL | Age: 73
LOS: 1 days | End: 2023-03-09
Payer: COMMERCIAL

## 2023-03-09 ENCOUNTER — APPOINTMENT (OUTPATIENT)
Dept: MRI IMAGING | Facility: IMAGING CENTER | Age: 73
End: 2023-03-09
Payer: COMMERCIAL

## 2023-03-09 DIAGNOSIS — Z98.890 OTHER SPECIFIED POSTPROCEDURAL STATES: Chronic | ICD-10-CM

## 2023-03-09 DIAGNOSIS — C34.90 MALIGNANT NEOPLASM OF UNSPECIFIED PART OF UNSPECIFIED BRONCHUS OR LUNG: ICD-10-CM

## 2023-03-09 PROCEDURE — A9585: CPT

## 2023-03-09 PROCEDURE — 70553 MRI BRAIN STEM W/O & W/DYE: CPT | Mod: 26

## 2023-03-09 PROCEDURE — 70553 MRI BRAIN STEM W/O & W/DYE: CPT

## 2023-03-10 ENCOUNTER — RESULT REVIEW (OUTPATIENT)
Age: 73
End: 2023-03-10

## 2023-03-10 ENCOUNTER — APPOINTMENT (OUTPATIENT)
Dept: HEMATOLOGY ONCOLOGY | Facility: CLINIC | Age: 73
End: 2023-03-10
Payer: COMMERCIAL

## 2023-03-10 VITALS
HEART RATE: 93 BPM | DIASTOLIC BLOOD PRESSURE: 81 MMHG | HEIGHT: 70 IN | TEMPERATURE: 97.7 F | SYSTOLIC BLOOD PRESSURE: 124 MMHG | RESPIRATION RATE: 16 BRPM | OXYGEN SATURATION: 96 % | BODY MASS INDEX: 17.58 KG/M2 | WEIGHT: 122.78 LBS

## 2023-03-10 LAB
BASOPHILS # BLD AUTO: 0.11 K/UL — SIGNIFICANT CHANGE UP (ref 0–0.2)
BASOPHILS NFR BLD AUTO: 1.4 % — SIGNIFICANT CHANGE UP (ref 0–2)
EOSINOPHIL # BLD AUTO: 0.56 K/UL — HIGH (ref 0–0.5)
EOSINOPHIL NFR BLD AUTO: 7.1 % — HIGH (ref 0–6)
HCT VFR BLD CALC: 44.1 % — SIGNIFICANT CHANGE UP (ref 39–50)
HGB BLD-MCNC: 14.5 G/DL — SIGNIFICANT CHANGE UP (ref 13–17)
IMM GRANULOCYTES NFR BLD AUTO: 0.4 % — SIGNIFICANT CHANGE UP (ref 0–0.9)
LYMPHOCYTES # BLD AUTO: 1.43 K/UL — SIGNIFICANT CHANGE UP (ref 1–3.3)
LYMPHOCYTES # BLD AUTO: 18.2 % — SIGNIFICANT CHANGE UP (ref 13–44)
MCHC RBC-ENTMCNC: 31 PG — SIGNIFICANT CHANGE UP (ref 27–34)
MCHC RBC-ENTMCNC: 32.9 G/DL — SIGNIFICANT CHANGE UP (ref 32–36)
MCV RBC AUTO: 94.2 FL — SIGNIFICANT CHANGE UP (ref 80–100)
MONOCYTES # BLD AUTO: 0.6 K/UL — SIGNIFICANT CHANGE UP (ref 0–0.9)
MONOCYTES NFR BLD AUTO: 7.6 % — SIGNIFICANT CHANGE UP (ref 2–14)
NEUTROPHILS # BLD AUTO: 5.12 K/UL — SIGNIFICANT CHANGE UP (ref 1.8–7.4)
NEUTROPHILS NFR BLD AUTO: 65.3 % — SIGNIFICANT CHANGE UP (ref 43–77)
NRBC # BLD: 0 /100 WBCS — SIGNIFICANT CHANGE UP (ref 0–0)
PLATELET # BLD AUTO: 252 K/UL — SIGNIFICANT CHANGE UP (ref 150–400)
RBC # BLD: 4.68 M/UL — SIGNIFICANT CHANGE UP (ref 4.2–5.8)
RBC # FLD: 12.4 % — SIGNIFICANT CHANGE UP (ref 10.3–14.5)
WBC # BLD: 7.85 K/UL — SIGNIFICANT CHANGE UP (ref 3.8–10.5)
WBC # FLD AUTO: 7.85 K/UL — SIGNIFICANT CHANGE UP (ref 3.8–10.5)

## 2023-03-10 PROCEDURE — 99215 OFFICE O/P EST HI 40 MIN: CPT

## 2023-03-10 PROCEDURE — 93010 ELECTROCARDIOGRAM REPORT: CPT

## 2023-03-10 NOTE — ASSESSMENT
[Palliative] : Goals of care discussed with patient: Palliative [FreeTextEntry1] : 73 yo with newly diagnosed NSCLC stage IV . PDL1 was 80% and tumor NGS showed high TMB. Blood NGS through [facility] revealed- HRAS and TP53 mutations.\par Pt started with carbo/alimta/pembro as front line option on 11/6/2020. Completed 4 cycles wo irAEs. \par Now on Keytruda monotherapy as maintenance. Will continue on Keytruda q3w regimen. PET/CT 12/13/21 and April 2022 reviewed in detail personally and showed excellent response. I reviewed the most recent CT done in August with pt- this shows a stable 2.3 cm right upper lobe nodule corresponding to the patient's treated non-small cell lung cancer, as well as an additional stable 0.7 cm left upper lobe nodule and 1.3 cm right hilar lymphadenopathy. Most recent CT scans from November 2022 show stable treated RUL neoplasm, stable 1.2cm right hilar node and 7mm left upper lobe nodule. \par \par Completed 2 years of maintenance Febuary 10th 2023\par CT scan done in Jan 2023 shows stable findings except for a micronodule, in the TI- could be inflammatory since pt started having URI/LRI symptoms immediately after this. \par CAD: Recently saw cardiologist Dr. Luna who requested troponin,reviewed all these labs and noted to be OK. BNP declining\par Recent event as noted above. MRI shows no acute pathology\par Will get EKG, labs including cortisol and troponin\par Suspect this is a syncopal episode, ? cardiac-related\par Follow up with Dr Luna ASA\par Pt does not want to go to the hospital\par Re-emphasized ongoing tobacco cessation. \par Extensive education about risks of not being evaluated in time was provided\par I reviewed the EMR in its entirety including notes from other providers, labs, path, and scan reports\par I reviewed imaging studies independently\par ECho and carotid doppler prior to next visit\par TEB in 3 weeks\par \par \par \par

## 2023-03-10 NOTE — HISTORY OF PRESENT ILLNESS
[Disease: _____________________] : Disease: [unfilled] [M: ___] : M[unfilled] [AJCC Stage: ____] : AJCC Stage: [unfilled] [de-identified] : 73 yo M with tobacco smoking hx and 911 exposure ( being followed at 911 clinic,  was diagnosed with COPD/emphysema (dx soon after 9/11), had screening CT in 2019 which showed a lung mass. Work up at [facility]- solitary mass which was resected (benign-organizing PNA). He was referred to pulm grant a year ago by [facility] program but he made an appointment this year with Dr. Pritchard. He referred him for screening CT in Feb which was done in August and showed right upper lobe 3.7 x 3.8 cm mass with interval increase in size since January 7, 2019 worrisome for neoplasm possibly a primary lung neoplasm. Multiple bilateral smaller nodular opacities are new since January 7, 2019 may represent metastatic disease. Enlarged anterior mediastinal lymph node worrisome for metastatic disease. PET/CT 8/25/2020 IMPRESSION: Since outside PET/CT 1/30/2019: Considerable interval increased size and FDG avidity of a now spiculated right upper lobe mass with central necrosis suspicious for primary lung malignancy. New FDG avid enlarged mediastinal lymph nodes and bilateral pulmonary nodules probably metastatic disease. Indeterminate FDG avid left intraparotid focus, not well evaluated secondary to misregistration artifact, however appears new since prior PET/CT, possibly metastatic. He underwent CT guided bx by Dr. Barraza at [facility] which was suggestive of malignancy. He subsequently underwent bronch and EBUS/bx of level 4 LN which was c/w adeno ca, lung primary. He is still doing well from a symptomatic standpoint. He has lost some weight which he usually does every Summer. He denies fevers, chills, dyspnea, chest pain/tightness, cough, wheeze. Continues to work on a full-time basis as a . \par 10/27/20: pt has no new complaints. He had Ct scans and MRi of brain interval enlargement of multiple bilateral pulmonary masses and mediastinal adenopathy consistent with progressive disease. CT abdomen and MRI brain revealed no mets. \par \par 11/6/2020: Pt presents prior to starting treatment today to review regimen, address any concerns and provide written consent. He offers no new complaints. \par \par 11/27/2020: Pt returns for follow up. S/p first cycle. Tolerating well . NOT SMOKING!!!!! Doing well. Had one episode of near syncope (vaso-vagal). Pt states he was very dehydrated. No further episodes since. Pt states he "feels great"\par \par 12/18/2020: Pt returns for follow up. Tolerating treatment wo any AE. Only reports mild occasional fatigue. Scheduled for C#3 today\par \par 1/8/2021: Pt returns for follow up and discussion of findings on recent imaging. He is feeling well. Appetite good. Occasional fatigue. States he is losing his hair and is very unhappy about it. \par \par 1/14/21: Patient came to clinic for follow up today, he is s/p C4 Carbo/Alimta, Keytruda on 1/8/21, he reports worsening fatigue, poor appetite but stable weight, also reports  for past 2-3 days and  today, reports burning sensation feeling in chest that radiates to his throat and  nausea. Denies sob and skin rash. He did not take any BP meds today as his BP was in 100s He also experiences constipation. ECG was done in the office and showed some flip T waves in anterior leads. Plan to send him to [facility] to evaluate.\par \par 1/29/21: Pt had a prolonged recovery period after last treatment. Nausea, dysphagia, weight loss, and chest pain. As noted above, pt was hospitalized. No acute etiology was found and he was discharged home., He feels better now. Started eating again. CT scan in the hospital on 1/14 revealed  \par 1. Small right upper lobe pulmonary embolism. No evidence of pulmonary infarct or right heart strain.\par 2. Right upper lobe pulmonary mass and bilateral pulmonary nodules are unchanged from 01/05/2021.\par 3. There is extensive wall thickening in the proximal duodenum and within left upper quadrant small bowel loops, compatible with enteritis. Upper abdominal free fluid seen between the duodenum and pancreas and within the left upper quadrant, inferior to the pancreas, may be related to enteritis. Superimposed pancreatitis should be excluded clinically.\par 4. Free fluid in the right perinephric space, posterior-medial to the right kidney is new from 01/05/2021. Underlying genitourinary infection/pyelonephritis should be excluded clinically.\par \par He has lost a lot of weight since last visit. \par \par 2/19/21: Pt reports feeling much better. No ae from last treatment with monotherapy Keytruda. Pt has been seeing GI and cologard colon cancer assay was reported as positive and patient has been scheduled for colonoscopy on 3/19/21. No new complaints. \par \par 4/2/21: Pt returns for follow up. He is feeling well. States he had colonoscopy and endoscopy last week and "everything was normal." Tolerating treatment well. Offers no complaints. \par \par 4/23/21: Pt returns for follow up and discussion of findings on recent imaging. Offers no complaints. Feeling well. Scheduled for treatment today. \par \par 5/11/21: Pt feeling well, doing good on Keytruda. Weight is stable, has no complaints.\par \par 6/4/21: pt returns for f/u. Feels well. No irAE but pt describes extremely poor appetite. Losing weight. No other complaints\par \par 7/16/21:  Pt here for f/u. He is currently receiving  Keytruda monotherpy today with no irEAs. Pt denies SOB, rash, diarrhea, cough and itching.  He feels well and offers no complaints. Pt weight is stable and he verbalized increase appetite.  \par \par 8/27/21: Pt returns for follow up. He is feeling well but expresses concern about not being able to gain weight. No other concerns /complaints.\par \par 9/17/21:  Patient presented for f/u visit. Patient denies SOB, KENNEDY, diarrhea, rash and other irAEs.  Patient gained 1lb. Patient offers no other complaints and verbalized that he is feeling well.  \par \par 10/8/21: No complaints. \par \par 12/15/21: No complaints. No irAEs\par \par 1/28/22: Patient seen in treatment room for follow up. Reports doing well without any complaints. Denies worsening SOB, cough CP, abdominal pain, n/v/d, itching. Reports feeling generally well overall. \par \par 3/11/22: Pt has no symptoms to report. He is doing well without any irAEs\par \par 5/5/22: Pt seen via tele-visit for follow up. he has no new symptoms. Tolerating Keytruda without any irAEs. \par \par 7/15/22: Tolerating well with no AEs. Active and working. \par \par 8/26/22: Patient seen today in treatment room for follow up. He is doing well and tolerating Keytruda without AEs\par \par 10/29/22: Pt here for follow up. He is doing well with no AEs. Still smoking occasionally, due to peer influence at work. \par \par 12/30/2022: Seen today for follow up. Noted to have a minor cough and reports that is getting over a cold (not COVID). Overall feeling well. Continues to work and states he is not smoking any longer. \par \par 1/20/2022: Patient seen today in treatment room for follow up. Doing well. Last treatment scheduled for Feb 10th 2023\par \par 2/10/23: GEtting over a cold, has some cough. Tested for COVID at home, was negative per patient. 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 Pt had LOC for 2 mins or so and had urinary incontinence. His friends called 911 and was evaluated on the scene by EMT and everything was OK per patient. He was asked to go to ER but declined. Pt subsequently called Dr. Luna and my office. We advised pt to go to ER but pt again declined. I hence, sent him for brain MRI which showed chronic microvascular changes, but nothing acute.  [de-identified] : adeno ca [de-identified] : PDL1 80% [FreeTextEntry1] : Maintenance Keytruda

## 2023-03-10 NOTE — PHYSICAL EXAM
[Fully active, able to carry on all pre-disease performance without restriction] : Status 0 - Fully active, able to carry on all pre-disease performance without restriction [Thin] : thin [Normal] : affect appropriate [de-identified] : rt basal crackles

## 2023-03-10 NOTE — REVIEW OF SYSTEMS
[Cough] : cough [SOB on Exertion] : shortness of breath during exertion [Negative] : ENT [Fever] : no fever [Fatigue] : no fatigue [Dysphagia] : no dysphagia [Chest Pain] : no chest pain [Shortness Of Breath] : no shortness of breath [Abdominal Pain] : no abdominal pain [Vomiting] : no vomiting [Diarrhea] : no diarrhea [de-identified] : as above, just one episode

## 2023-03-13 LAB
ALBUMIN SERPL ELPH-MCNC: 4.5 G/DL
ALP BLD-CCNC: 102 U/L
ALT SERPL-CCNC: 20 U/L
ANION GAP SERPL CALC-SCNC: 14 MMOL/L
AST SERPL-CCNC: 29 U/L
BILIRUB SERPL-MCNC: 0.4 MG/DL
BUN SERPL-MCNC: 21 MG/DL
CALCIUM SERPL-MCNC: 9.4 MG/DL
CHLORIDE SERPL-SCNC: 101 MMOL/L
CO2 SERPL-SCNC: 23 MMOL/L
CREAT SERPL-MCNC: 1.07 MG/DL
EGFR: 75 ML/MIN/1.73M2
GLUCOSE SERPL-MCNC: 87 MG/DL
MAGNESIUM SERPL-MCNC: 1.9 MG/DL
POTASSIUM SERPL-SCNC: 4.3 MMOL/L
PROT SERPL-MCNC: 7.3 G/DL
SODIUM SERPL-SCNC: 139 MMOL/L
TROPONIN-T, HIGH SENSITIVITY: 15 NG/L
TSH SERPL-ACNC: 2.54 UIU/ML

## 2023-03-21 LAB
CORTICOSTEROID BIND GLOBULIN: 1.9 MG/DL
CORTIS SERPL-MCNC: 6.9 UG/DL
CORTISOL, FREE: 0.36 UG/DL
PFCX: 5.2 %

## 2023-03-31 ENCOUNTER — OUTPATIENT (OUTPATIENT)
Dept: OUTPATIENT SERVICES | Facility: HOSPITAL | Age: 73
LOS: 1 days | End: 2023-03-31

## 2023-03-31 ENCOUNTER — APPOINTMENT (OUTPATIENT)
Dept: CV DIAGNOSITCS | Facility: HOSPITAL | Age: 73
End: 2023-03-31
Payer: COMMERCIAL

## 2023-03-31 DIAGNOSIS — Z98.890 OTHER SPECIFIED POSTPROCEDURAL STATES: Chronic | ICD-10-CM

## 2023-03-31 DIAGNOSIS — C34.90 MALIGNANT NEOPLASM OF UNSPECIFIED PART OF UNSPECIFIED BRONCHUS OR LUNG: ICD-10-CM

## 2023-03-31 PROCEDURE — 93306 TTE W/DOPPLER COMPLETE: CPT | Mod: 26

## 2023-03-31 PROCEDURE — 93880 EXTRACRANIAL BILAT STUDY: CPT | Mod: 26

## 2023-04-07 ENCOUNTER — OUTPATIENT (OUTPATIENT)
Dept: OUTPATIENT SERVICES | Facility: HOSPITAL | Age: 73
LOS: 1 days | Discharge: HOME | End: 2023-04-07
Payer: COMMERCIAL

## 2023-04-07 DIAGNOSIS — Z98.890 OTHER SPECIFIED POSTPROCEDURAL STATES: Chronic | ICD-10-CM

## 2023-04-07 DIAGNOSIS — C34.11 MALIGNANT NEOPLASM OF UPPER LOBE, RIGHT BRONCHUS OR LUNG: ICD-10-CM

## 2023-04-14 ENCOUNTER — APPOINTMENT (OUTPATIENT)
Dept: CARDIOLOGY | Facility: CLINIC | Age: 73
End: 2023-04-14
Payer: COMMERCIAL

## 2023-04-14 VITALS
BODY MASS INDEX: 17.04 KG/M2 | TEMPERATURE: 97.2 F | HEART RATE: 91 BPM | SYSTOLIC BLOOD PRESSURE: 118 MMHG | HEIGHT: 70 IN | RESPIRATION RATE: 16 BRPM | DIASTOLIC BLOOD PRESSURE: 76 MMHG | OXYGEN SATURATION: 95 % | WEIGHT: 119.05 LBS

## 2023-04-14 DIAGNOSIS — Z86.79 PERSONAL HISTORY OF OTHER DISEASES OF THE CIRCULATORY SYSTEM: ICD-10-CM

## 2023-04-14 PROCEDURE — 99214 OFFICE O/P EST MOD 30 MIN: CPT

## 2023-04-14 PROCEDURE — 93010 ELECTROCARDIOGRAM REPORT: CPT

## 2023-04-14 PROCEDURE — 99406 BEHAV CHNG SMOKING 3-10 MIN: CPT

## 2023-04-14 PROCEDURE — 93000 ELECTROCARDIOGRAM COMPLETE: CPT

## 2023-04-14 NOTE — ASSESSMENT
[FreeTextEntry1] : 72 year old man with lung adenocarcinoma with ROHINI s/p chemo and maintenance PD-1 inhibitor immunotherapy, HTN, atherosclerotic plaque, and history of pulmonary embolism.\par \par He tolerated immunotherapy with minimal if any side effects. We discussed that late effects of immunotherapy are unknown, though given accelerated atherogenesis a aggressive approach to ASCVD risk reduction is prudent.\par \par # Atherosclerosis: bilateral common femoral and SMA disease on his CT scans, history of smoking.\par - Continue rosuvastatin 5 mg daily\par - continue aspirin 81 mg daily for now\par - Lipid panel 1/20/2023: LDL 89, , Non-, HDL 57\par - A1c 5.5 %\par - importance of not smoking and avoiding second hand smoke emphasized\par \par # Immunotherapy\par - troponin T 15 ng/L (normal)\par - pro-BNP: 364 pg/mL (1/2023), improved from Jan 2021 (5716)\par \par # Mild-moderate MR by echo - asymptomatic.\par - follow up in one year\par \par # Syncope: with prodrome of dizziness and feeling sweaty, 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 No recurrence and appears noncardiogenic at present. He notes increased attention to staying well hydrated and I advised he continue this. 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

## 2023-04-14 NOTE — REASON FOR VISIT
[FreeTextEntry3] : Dr. Bell [FreeTextEntry1] : JAIME MCDONALD is a 72 year old man with WTC exposure (), smoker, hypertension, metastatic lung adenoCA s/p carbo/pemetrexed and pembrolizumab (11/2020-2/2023), pulmonary embolism (Jan 2021), and coronary artery calcifications by CT seen for follow up.

## 2023-04-14 NOTE — HISTORY OF PRESENT ILLNESS
[FreeTextEntry1] : Interval History:\par Feels well. No recurrent syncope. He had brain MRI and no mass or acute intracranial pathology identified.\par He denies chest pain. He continues working as a .\par He completed immunotherapy and post treatment troponin T and pro-BNP were within normal limits.\par Recent lipid panel reviewed.\par An echocardiogram was also performed, normal LV function and mild-moderate MR.\par \par History:\par At oncology visit 1/14 reported chest discomfort and labile blood pressure with hypertension at home, noted to have new TWI on his ECG and sent to ER for workup. In ER Hs Annika was 27-25-21 ng/L (negative), BNP 5716 (elevated). ECG RBBB, chronic. CT-Angiogram showed new segmental PE, started on anticoagulation with enoxaparin and transitioned to apixaban.\par \par Also noted on the CT were coronary artery calcification and signs of colitis. He was prescribed a course of cipro/flagyl for this by the ER, which he feels did nothing. He denies any chest pain or new shortness of breath. He denies any dizziness, palpitations, syncope, or near syncope. He had mild esophagitis from PD-1 inhibitor initially, which resolved.\par \par He denies any history of heart disease but has right bundle branch block on the ECG, which is chronic. Work as a  causes him moderate stress, and he smoked until cancer diagnosis.\par \par He tells me he takes amlodipine for HTN, but is no longer taking HCTZ or losartan. No recent stress test.\par \par Compliant with apixaban, notes some ecchymoses.\par \par May 2021:\par \par He feels well and denies any new cardiac symptoms or complaints. He is taking medications as directed without any notable adverse effects. Continues on immunotherapy with PD-1 inhibitor, every 3 weeks, tolerating well. He smoked 1 or 2 cigarettes in the past month and has some second hand smoke exposure.\par \par No new chest pain, palps, dizziness/lightheadedness.\par \par Sept 10 2021\par Feels well. Continues on pembrolizumab plan for total 24 months. Good disease response to date. Still smoking, but less than 5 per week. No  chest pain, no palps, no change in ET. No bleeding. Dyspnea on exertion is stable. \par \par July 11, 2022\par Interval History: Feels great. Tells me he stopped smoking. Continue to experience dyspnea with exertion, relieved with nebulizers. Symptoms are stable. Has been taking apixaban only once a day.\par \par Jan 13, 2023:\par Feels well. No palpitations. No new complaints. Tolerating maintenance immunotherapy and there are planned cycles remaining.\par His wife was diagnosed with breast cancer, so he returned to work to retain insurance benefits for her treatment.\par Medications reviewed and confirmed.\par Echo from July with no change from prior, but suboptimal study and GLS not performed.\par \par Cardiovascular Testing:\par ------------------------------------------------\par ECG:\par 4/14/2023: sinus with RBBB, no change from prior\par 7/11/2022: sinus w RBBB, no change from prior\par 1/14/2022: sinus with RBBB, unchanged from prior\par 9/10/2021: sinus rhythm 73 bpm, RBBB. Unchanged from prior\par 5/10/2021: sinus rhythm at 86 bpm with RBBB, unchanged from prior \par --------------------------------------------------\par Echo:\par 3/31/2023: normal EF, mild-moderate MR with MAC, mild-moderate TR (technically difficult)\par 7/21/2022: mild MR, mild LV dysfunction, no pericardial effusion\par 2/08/2021: technically difficult, MAC, mild MR. Mild LV dysfunction. No pericardial effusion \par ---------------------------------------------------\par CT\par 1/20/2023: R CFA dilated with calcified and non-calcified plaque, L CFA w calcified/non-calcified plaque\par July : CT chest, abdomen with IV contrast: no aortic aneurysm and moderate proximal SMA stenosis \par

## 2023-04-14 NOTE — PHYSICAL EXAM
[No Acute Distress] : no acute distress [Normal Conjunctiva] : normal conjunctiva [No Xanthelasma] : no xanthelasma [Normal Venous Pressure] : normal venous pressure [Normal S1, S2] : normal S1, S2 [Clear Lung Fields] : clear lung fields [Good Air Entry] : good air entry [No Respiratory Distress] : no respiratory distress  [Soft] : abdomen soft [Normal Gait] : normal gait [No Edema] : no edema [No Cyanosis] : no cyanosis [No Varicosities] : no varicosities [No Rash] : no rash [Moves all extremities] : moves all extremities [No Focal Deficits] : no focal deficits [Normal Speech] : normal speech [Alert and Oriented] : alert and oriented [de-identified] : no orthostatic hypotension [de-identified] : digital clubbing

## 2023-04-19 ENCOUNTER — APPOINTMENT (OUTPATIENT)
Dept: HEMATOLOGY ONCOLOGY | Facility: CLINIC | Age: 73
End: 2023-04-19
Payer: COMMERCIAL

## 2023-04-19 ENCOUNTER — APPOINTMENT (OUTPATIENT)
Dept: HEMATOLOGY ONCOLOGY | Facility: CLINIC | Age: 73
End: 2023-04-19

## 2023-04-19 PROCEDURE — 99443: CPT | Mod: 95

## 2023-04-19 NOTE — REVIEW OF SYSTEMS
[Fever] : no fever [Fatigue] : no fatigue [Dysphagia] : no dysphagia [Chest Pain] : no chest pain [Shortness Of Breath] : no shortness of breath [Abdominal Pain] : no abdominal pain [Vomiting] : no vomiting [Diarrhea] : no diarrhea [de-identified] : as above, just one episode

## 2023-04-19 NOTE — ASSESSMENT
[FreeTextEntry1] : 71 yo with newly diagnosed NSCLC stage IV . PDL1 was 80% and tumor NGS showed high TMB. Blood NGS through [facility] revealed- HRAS and TP53 mutations.\par Pt started with carbo/alimta/pembro as front line option on 11/6/2020. Completed 4 cycles wo irAEs. \par Now on Keytruda monotherapy as maintenance. Will continue on Keytruda q3w regimen. PET/CT 12/13/21 and April 2022 reviewed in detail personally and showed excellent response. I reviewed the most recent CT done in August with pt- this shows a stable 2.3 cm right upper lobe nodule corresponding to the patient's treated non-small cell lung cancer, as well as an additional stable 0.7 cm left upper lobe nodule and 1.3 cm right hilar lymphadenopathy. Most recent CT scans from November 2022 show stable treated RUL neoplasm, stable 1.2cm right hilar node and 7mm left upper lobe nodule. \par \par Completed 2 years of maintenance Febuary 10th 2023\par CT scan done in Jan 2023 shows stable findings except for a micronodule, in the TI- could be inflammatory since pt started having URI/LRI symptoms immediately after this. \par CAD: Recently saw cardiologist Dr. Luna who requested troponin,reviewed all these labs and noted to be OK. \par Syncopal episode: MRI brain did not show acute abnormalities. Had TTE and carotid dopplers, followed with cardiology and thought to be non cardiac in nature. Likely dehydration. Has not recurred, continue to monitor. \par Re-emphasized ongoing tobacco cessation. \par Extensive education about risks of not being evaluated in time was provided\par \par \par \par \par

## 2023-04-19 NOTE — HISTORY OF PRESENT ILLNESS
[de-identified] : 73 yo M with tobacco smoking hx and 911 exposure ( being followed at 911 clinic,  was diagnosed with COPD/emphysema (dx soon after 9/11), had screening CT in 2019 which showed a lung mass. Work up at [facility]- solitary mass which was resected (benign-organizing PNA). He was referred to pulm grant a year ago by [facility] program but he made an appointment this year with Dr. Pritchard. He referred him for screening CT in Feb which was done in August and showed right upper lobe 3.7 x 3.8 cm mass with interval increase in size since January 7, 2019 worrisome for neoplasm possibly a primary lung neoplasm. Multiple bilateral smaller nodular opacities are new since January 7, 2019 may represent metastatic disease. Enlarged anterior mediastinal lymph node worrisome for metastatic disease. PET/CT 8/25/2020 IMPRESSION: Since outside PET/CT 1/30/2019: Considerable interval increased size and FDG avidity of a now spiculated right upper lobe mass with central necrosis suspicious for primary lung malignancy. New FDG avid enlarged mediastinal lymph nodes and bilateral pulmonary nodules probably metastatic disease. Indeterminate FDG avid left intraparotid focus, not well evaluated secondary to misregistration artifact, however appears new since prior PET/CT, possibly metastatic. He underwent CT guided bx by Dr. Barraza at [facility] which was suggestive of malignancy. He subsequently underwent bronch and EBUS/bx of level 4 LN which was c/w adeno ca, lung primary. He is still doing well from a symptomatic standpoint. He has lost some weight which he usually does every Summer. He denies fevers, chills, dyspnea, chest pain/tightness, cough, wheeze. Continues to work on a full-time basis as a . \par 10/27/20: pt has no new complaints. He had Ct scans and MRi of brain interval enlargement of multiple bilateral pulmonary masses and mediastinal adenopathy consistent with progressive disease. CT abdomen and MRI brain revealed no mets. \par \par 11/6/2020: Pt presents prior to starting treatment today to review regimen, address any concerns and provide written consent. He offers no new complaints. \par \par 11/27/2020: Pt returns for follow up. S/p first cycle. Tolerating well . NOT SMOKING!!!!! Doing well. Had one episode of near syncope (vaso-vagal). Pt states he was very dehydrated. No further episodes since. Pt states he "feels great"\par \par 12/18/2020: Pt returns for follow up. Tolerating treatment wo any AE. Only reports mild occasional fatigue. Scheduled for C#3 today\par \par 1/8/2021: Pt returns for follow up and discussion of findings on recent imaging. He is feeling well. Appetite good. Occasional fatigue. States he is losing his hair and is very unhappy about it. \par \par 1/14/21: Patient came to clinic for follow up today, he is s/p C4 Carbo/Alimta, Keytruda on 1/8/21, he reports worsening fatigue, poor appetite but stable weight, also reports  for past 2-3 days and  today, reports burning sensation feeling in chest that radiates to his throat and  nausea. Denies sob and skin rash. He did not take any BP meds today as his BP was in 100s He also experiences constipation. ECG was done in the office and showed some flip T waves in anterior leads. Plan to send him to [facility] to evaluate.\par \par 1/29/21: Pt had a prolonged recovery period after last treatment. Nausea, dysphagia, weight loss, and chest pain. As noted above, pt was hospitalized. No acute etiology was found and he was discharged home., He feels better now. Started eating again. CT scan in the hospital on 1/14 revealed  \par 1. Small right upper lobe pulmonary embolism. No evidence of pulmonary infarct or right heart strain.\par 2. Right upper lobe pulmonary mass and bilateral pulmonary nodules are unchanged from 01/05/2021.\par 3. There is extensive wall thickening in the proximal duodenum and within left upper quadrant small bowel loops, compatible with enteritis. Upper abdominal free fluid seen between the duodenum and pancreas and within the left upper quadrant, inferior to the pancreas, may be related to enteritis. Superimposed pancreatitis should be excluded clinically.\par 4. Free fluid in the right perinephric space, posterior-medial to the right kidney is new from 01/05/2021. Underlying genitourinary infection/pyelonephritis should be excluded clinically.\par \par He has lost a lot of weight since last visit. \par \par 2/19/21: Pt reports feeling much better. No ae from last treatment with monotherapy Keytruda. Pt has been seeing GI and cologard colon cancer assay was reported as positive and patient has been scheduled for colonoscopy on 3/19/21. No new complaints. \par \par 4/2/21: Pt returns for follow up. He is feeling well. States he had colonoscopy and endoscopy last week and "everything was normal." Tolerating treatment well. Offers no complaints. \par \par 4/23/21: Pt returns for follow up and discussion of findings on recent imaging. Offers no complaints. Feeling well. Scheduled for treatment today. \par \par 5/11/21: Pt feeling well, doing good on Keytruda. Weight is stable, has no complaints.\par \par 6/4/21: pt returns for f/u. Feels well. No irAE but pt describes extremely poor appetite. Losing weight. No other complaints\par \par 7/16/21:  Pt here for f/u. He is currently receiving  Keytruda monotherpy today with no irEAs. Pt denies SOB, rash, diarrhea, cough and itching.  He feels well and offers no complaints. Pt weight is stable and he verbalized increase appetite.  \par \par 8/27/21: Pt returns for follow up. He is feeling well but expresses concern about not being able to gain weight. No other concerns /complaints.\par \par 9/17/21:  Patient presented for f/u visit. Patient denies SOB, KENNEDY, diarrhea, rash and other irAEs.  Patient gained 1lb. Patient offers no other complaints and verbalized that he is feeling well.  \par \par 10/8/21: No complaints. \par \par 12/15/21: No complaints. No irAEs\par \par 1/28/22: Patient seen in treatment room for follow up. Reports doing well without any complaints. Denies worsening SOB, cough CP, abdominal pain, n/v/d, itching. Reports feeling generally well overall. \par \par 3/11/22: Pt has no symptoms to report. He is doing well without any irAEs\par \par 5/5/22: Pt seen via tele-visit for follow up. he has no new symptoms. Tolerating Keytruda without any irAEs. \par \par 7/15/22: Tolerating well with no AEs. Active and working. \par \par 8/26/22: Patient seen today in treatment room for follow up. He is doing well and tolerating Keytruda without AEs\par \par 10/29/22: Pt here for follow up. He is doing well with no AEs. Still smoking occasionally, due to peer influence at work. \par \par 12/30/2022: Seen today for follow up. Noted to have a minor cough and reports that is getting over a cold (not COVID). Overall feeling well. Continues to work and states he is not smoking any longer. \par \par 1/20/2022: Patient seen today in treatment room for follow up. Doing well. Last treatment scheduled for Feb 10th 2023\par \par 2/10/23: GEtting over a cold, has some cough. Tested for COVID at home, was negative per patient. 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 Pt had LOC for 2 mins or so and had urinary incontinence. His friends called 911 and was evaluated on the scene by EMT and everything was OK per patient. He was asked to go to ER but declined. Pt subsequently called Dr. Luna and my office. We advised pt to go to ER but pt again declined. I hence, sent him for brain MRI which showed chronic microvascular changes, but nothing acute. \par \par 4/19/23: Patient seen today for follow up. Of note, he had TTE done that showed grossly preserved EF, and carotid dopplers that did not show hemodynamically significant stenosis. He just saw his cardiologist who does not feel his syncopal episode was cardiac in nature and will follow up with him in one month. Today patient reports that he is feeling terrific. Has not had any additional syncopal episodes, had rehearsal last night where he sang a lot and felt okay. He feels that his syncopal episode was likely related to dehydration and has been drinking much more water. His breathing is stable, still gets short of breath with exertion, does not have any pain. He has returned to work as his wife now has cancer and he needs medical coverage.  [de-identified] : adeno ca [de-identified] : PDL1 80% [FreeTextEntry1] : Maintenance Keytruda

## 2023-05-08 ENCOUNTER — RESULT REVIEW (OUTPATIENT)
Age: 73
End: 2023-05-08

## 2023-05-09 ENCOUNTER — APPOINTMENT (OUTPATIENT)
Dept: PULMONOLOGY | Facility: CLINIC | Age: 73
End: 2023-05-09
Payer: COMMERCIAL

## 2023-05-09 VITALS
BODY MASS INDEX: 17.61 KG/M2 | SYSTOLIC BLOOD PRESSURE: 129 MMHG | DIASTOLIC BLOOD PRESSURE: 81 MMHG | HEART RATE: 84 BPM | WEIGHT: 123 LBS | OXYGEN SATURATION: 96 % | HEIGHT: 70 IN | RESPIRATION RATE: 17 BRPM

## 2023-05-09 PROCEDURE — 99214 OFFICE O/P EST MOD 30 MIN: CPT

## 2023-05-09 NOTE — HISTORY OF PRESENT ILLNESS
[TextBox_4] : Patient doing very well. Appetite good, last scans showed no evidence active disease and he is due for repeat scanning soon. Not taking trelegy regularly, and notes continued dyspnea with exertion. Not smoking.

## 2023-05-12 ENCOUNTER — RESULT REVIEW (OUTPATIENT)
Age: 73
End: 2023-05-12

## 2023-05-12 ENCOUNTER — OUTPATIENT (OUTPATIENT)
Dept: OUTPATIENT SERVICES | Facility: HOSPITAL | Age: 73
LOS: 1 days | End: 2023-05-12
Payer: COMMERCIAL

## 2023-05-12 ENCOUNTER — APPOINTMENT (OUTPATIENT)
Dept: CT IMAGING | Facility: CLINIC | Age: 73
End: 2023-05-12
Payer: COMMERCIAL

## 2023-05-12 DIAGNOSIS — Z00.8 ENCOUNTER FOR OTHER GENERAL EXAMINATION: ICD-10-CM

## 2023-05-12 DIAGNOSIS — Z98.890 OTHER SPECIFIED POSTPROCEDURAL STATES: Chronic | ICD-10-CM

## 2023-05-12 DIAGNOSIS — C34.90 MALIGNANT NEOPLASM OF UNSPECIFIED PART OF UNSPECIFIED BRONCHUS OR LUNG: ICD-10-CM

## 2023-05-12 PROCEDURE — 74177 CT ABD & PELVIS W/CONTRAST: CPT

## 2023-05-12 PROCEDURE — 74177 CT ABD & PELVIS W/CONTRAST: CPT | Mod: 26

## 2023-05-12 PROCEDURE — 71260 CT THORAX DX C+: CPT | Mod: 26

## 2023-05-12 PROCEDURE — 71260 CT THORAX DX C+: CPT

## 2023-05-23 ENCOUNTER — APPOINTMENT (OUTPATIENT)
Dept: HEMATOLOGY ONCOLOGY | Facility: CLINIC | Age: 73
End: 2023-05-23
Payer: COMMERCIAL

## 2023-05-23 VITALS
WEIGHT: 122.36 LBS | DIASTOLIC BLOOD PRESSURE: 78 MMHG | HEART RATE: 79 BPM | SYSTOLIC BLOOD PRESSURE: 131 MMHG | BODY MASS INDEX: 17.56 KG/M2 | OXYGEN SATURATION: 95 % | RESPIRATION RATE: 16 BRPM | TEMPERATURE: 97.3 F

## 2023-05-23 DIAGNOSIS — R42 DIZZINESS AND GIDDINESS: ICD-10-CM

## 2023-05-23 PROCEDURE — 99214 OFFICE O/P EST MOD 30 MIN: CPT

## 2023-05-23 NOTE — ASSESSMENT
[Palliative] : Goals of care discussed with patient: Palliative [FreeTextEntry1] : 73 yo with newly diagnosed NSCLC stage IV . PDL1 was 80% and tumor NGS showed high TMB. Blood NGS through [facility] revealed- HRAS and TP53 mutations.\par Pt started with carbo/alimta/pembro as front line option on 11/6/2020. Completed 4 cycles wo irAEs. \par Now on Keytruda monotherapy as maintenance. Will continue on Keytruda q3w regimen. PET/CT 12/13/21 and April 2022 reviewed in detail personally and showed excellent response. I reviewed the most recent CT done in August with pt- this shows a stable 2.3 cm right upper lobe nodule corresponding to the patient's treated non-small cell lung cancer, as well as an additional stable 0.7 cm left upper lobe nodule and 1.3 cm right hilar lymphadenopathy. CT scans from November 2022 show stable treated RUL neoplasm, stable 1.2cm right hilar node and 7mm left upper lobe nodule. CT scan done in Jan 2023 shows stable findings except for a micronodule, in the TI- could be inflammatory since pt started having URI/LRI symptoms immediately after this. Completed 2 years of maintenance Febuary 10th 2023. Follow up scans in May 2023 showed resolution of previously seen 3mm subpleural TI nodule, right lung findings stable. \par \par Continue to monitor off treatment\par Will be due for scans in August 2023, orders entered. \par CAD: Recently saw cardiologist Dr. Luna who requested troponin,reviewed all these labs and noted to be OK. \par Syncopal episode: MRI brain did not show acute abnormalities. Had TTE and carotid dopplers, followed with cardiology and thought to be non cardiac in nature. Pre-syncope recurred likely due to dehydration and over-exertion. Discussed adequate hydration with patient. \par Check labs today including CMP and CBC \par Explained that patient should call our office and/or go to the ER should symptoms recur \par Re-emphasized ongoing tobacco cessation. \par OV in 3 months after repeat scans \par \par \par \par \par

## 2023-05-23 NOTE — HISTORY OF PRESENT ILLNESS
[Disease: _____________________] : Disease: [unfilled] [M: ___] : M[unfilled] [AJCC Stage: ____] : AJCC Stage: [unfilled] [Home] : at home, [unfilled] , at the time of the visit. [Medical Office: ([facility])___] : at the medical office located in  [de-identified] : 73 yo M with tobacco smoking hx and 911 exposure ( being followed at 911 clinic,  was diagnosed with COPD/emphysema (dx soon after 9/11), had screening CT in 2019 which showed a lung mass. Work up at [facility]- solitary mass which was resected (benign-organizing PNA). He was referred to pulm grant a year ago by [facility] program but he made an appointment this year with Dr. Pritchard. He referred him for screening CT in Feb which was done in August and showed right upper lobe 3.7 x 3.8 cm mass with interval increase in size since January 7, 2019 worrisome for neoplasm possibly a primary lung neoplasm. Multiple bilateral smaller nodular opacities are new since January 7, 2019 may represent metastatic disease. Enlarged anterior mediastinal lymph node worrisome for metastatic disease. PET/CT 8/25/2020 IMPRESSION: Since outside PET/CT 1/30/2019: Considerable interval increased size and FDG avidity of a now spiculated right upper lobe mass with central necrosis suspicious for primary lung malignancy. New FDG avid enlarged mediastinal lymph nodes and bilateral pulmonary nodules probably metastatic disease. Indeterminate FDG avid left intraparotid focus, not well evaluated secondary to misregistration artifact, however appears new since prior PET/CT, possibly metastatic. He underwent CT guided bx by Dr. Barraza at [facility] which was suggestive of malignancy. He subsequently underwent bronch and EBUS/bx of level 4 LN which was c/w adeno ca, lung primary. He is still doing well from a symptomatic standpoint. He has lost some weight which he usually does every Summer. He denies fevers, chills, dyspnea, chest pain/tightness, cough, wheeze. Continues to work on a full-time basis as a . \par 10/27/20: pt has no new complaints. He had Ct scans and MRi of brain interval enlargement of multiple bilateral pulmonary masses and mediastinal adenopathy consistent with progressive disease. CT abdomen and MRI brain revealed no mets. \par \par 11/6/2020: Pt presents prior to starting treatment today to review regimen, address any concerns and provide written consent. He offers no new complaints. \par \par 11/27/2020: Pt returns for follow up. S/p first cycle. Tolerating well . NOT SMOKING!!!!! Doing well. Had one episode of near syncope (vaso-vagal). Pt states he was very dehydrated. No further episodes since. Pt states he "feels great"\par \par 12/18/2020: Pt returns for follow up. Tolerating treatment wo any AE. Only reports mild occasional fatigue. Scheduled for C#3 today\par \par 1/8/2021: Pt returns for follow up and discussion of findings on recent imaging. He is feeling well. Appetite good. Occasional fatigue. States he is losing his hair and is very unhappy about it. \par \par 1/14/21: Patient came to clinic for follow up today, he is s/p C4 Carbo/Alimta, Keytruda on 1/8/21, he reports worsening fatigue, poor appetite but stable weight, also reports  for past 2-3 days and  today, reports burning sensation feeling in chest that radiates to his throat and  nausea. Denies sob and skin rash. He did not take any BP meds today as his BP was in 100s He also experiences constipation. ECG was done in the office and showed some flip T waves in anterior leads. Plan to send him to [facility] to evaluate.\par \par 1/29/21: Pt had a prolonged recovery period after last treatment. Nausea, dysphagia, weight loss, and chest pain. As noted above, pt was hospitalized. No acute etiology was found and he was discharged home., He feels better now. Started eating again. CT scan in the hospital on 1/14 revealed  \par 1. Small right upper lobe pulmonary embolism. No evidence of pulmonary infarct or right heart strain.\par 2. Right upper lobe pulmonary mass and bilateral pulmonary nodules are unchanged from 01/05/2021.\par 3. There is extensive wall thickening in the proximal duodenum and within left upper quadrant small bowel loops, compatible with enteritis. Upper abdominal free fluid seen between the duodenum and pancreas and within the left upper quadrant, inferior to the pancreas, may be related to enteritis. Superimposed pancreatitis should be excluded clinically.\par 4. Free fluid in the right perinephric space, posterior-medial to the right kidney is new from 01/05/2021. Underlying genitourinary infection/pyelonephritis should be excluded clinically.\par \par He has lost a lot of weight since last visit. \par \par 2/19/21: Pt reports feeling much better. No ae from last treatment with monotherapy Keytruda. Pt has been seeing GI and cologard colon cancer assay was reported as positive and patient has been scheduled for colonoscopy on 3/19/21. No new complaints. \par \par 4/2/21: Pt returns for follow up. He is feeling well. States he had colonoscopy and endoscopy last week and "everything was normal." Tolerating treatment well. Offers no complaints. \par \par 4/23/21: Pt returns for follow up and discussion of findings on recent imaging. Offers no complaints. Feeling well. Scheduled for treatment today. \par \par 5/11/21: Pt feeling well, doing good on Keytruda. Weight is stable, has no complaints.\par \par 6/4/21: pt returns for f/u. Feels well. No irAE but pt describes extremely poor appetite. Losing weight. No other complaints\par \par 7/16/21:  Pt here for f/u. He is currently receiving  Keytruda monotherpy today with no irEAs. Pt denies SOB, rash, diarrhea, cough and itching.  He feels well and offers no complaints. Pt weight is stable and he verbalized increase appetite.  \par \par 8/27/21: Pt returns for follow up. He is feeling well but expresses concern about not being able to gain weight. No other concerns /complaints.\par \par 9/17/21:  Patient presented for f/u visit. Patient denies SOB, KENNEDY, diarrhea, rash and other irAEs.  Patient gained 1lb. Patient offers no other complaints and verbalized that he is feeling well.  \par \par 10/8/21: No complaints. \par \par 12/15/21: No complaints. No irAEs\par \par 1/28/22: Patient seen in treatment room for follow up. Reports doing well without any complaints. Denies worsening SOB, cough CP, abdominal pain, n/v/d, itching. Reports feeling generally well overall. \par \par 3/11/22: Pt has no symptoms to report. He is doing well without any irAEs\par \par 5/5/22: Pt seen via tele-visit for follow up. he has no new symptoms. Tolerating Keytruda without any irAEs. \par \par 7/15/22: Tolerating well with no AEs. Active and working. \par \par 8/26/22: Patient seen today in treatment room for follow up. He is doing well and tolerating Keytruda without AEs\par \par 10/29/22: Pt here for follow up. He is doing well with no AEs. Still smoking occasionally, due to peer influence at work. \par \par 12/30/2022: Seen today for follow up. Noted to have a minor cough and reports that is getting over a cold (not COVID). Overall feeling well. Continues to work and states he is not smoking any longer. \par \par 1/20/2022: Patient seen today in treatment room for follow up. Doing well. Last treatment scheduled for Feb 10th 2023\par \par 2/10/23: GEtting over a cold, has some cough. Tested for COVID at home, was negative per patient. 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 Pt had LOC for 2 mins or so and had urinary incontinence. His friends called 911 and was evaluated on the scene by EMT and everything was OK per patient. He was asked to go to ER but declined. Pt subsequently called Dr. Luna and my office. We advised pt to go to ER but pt again declined. I hence, sent him for brain MRI which showed chronic microvascular changes, but nothing acute. \par \par 4/19/23: Patient seen today for follow up. Of note, he had TTE done that showed grossly preserved EF, and carotid dopplers that did not show hemodynamically significant stenosis. He just saw his cardiologist who does not feel his syncopal episode was cardiac in nature and will follow up with him in one month. Today patient reports that he is feeling terrific. Has not had any additional syncopal episodes, had rehearsal last night where he sang a lot and felt okay. He feels that his syncopal episode was likely related to dehydration and has been drinking much more water. His breathing is stable, still gets short of breath with exertion, does not have any pain. He has returned to work as his wife now has cancer and he needs medical coverage. \par \par 5/23/23: Patient seen today for follow up. He reports that he is overall feeling well, no changes in breathing or cough. 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 Feels that the combination of dancing with singing precipitated the event. He notes that he does not drink water. Spoke to his cardiologist after the event and had a check up and cardiologist feels it was due to dehydration. He continues to work as a . His wife has breast cancer and just underwent radiation.  [de-identified] : adeno ca [de-identified] : PDL1 80% [FreeTextEntry1] : Maintenance Keytruda completed in February 2023

## 2023-05-23 NOTE — REVIEW OF SYSTEMS
[SOB on Exertion] : shortness of breath during exertion [Negative] : Heme/Lymph [Fever] : no fever [Fatigue] : no fatigue [Chest Pain] : no chest pain [Shortness Of Breath] : no shortness of breath [Cough] : no cough [Abdominal Pain] : no abdominal pain [Vomiting] : no vomiting [Joint Pain] : no joint pain [Muscle Pain] : no muscle pain [Dizziness] : dizziness [de-identified] : see above

## 2023-05-23 NOTE — PHYSICAL EXAM
[Fully active, able to carry on all pre-disease performance without restriction] : Status 0 - Fully active, able to carry on all pre-disease performance without restriction [Thin] : thin [Normal] : clear to auscultation bilaterally, no dullness, no wheezing

## 2023-06-06 NOTE — REVIEW OF SYSTEMS
LOW CARBOHYDRATE FOOD CHOICES      The foods in this list do not have the immediate effect on blood glucose level that carbs do, but they still provide calories (in most cases) and, in some cases, fat.  For this reason, portion sizes of low carbohydrate foods should be observed as carefully as those of carbohydrate containing foods.      VEGETABLES(1/2 cup cooked or 1 cup raw)         MEAT SUBSTITUTES  Asparagus                                                                       Cottage cheese, 1/2 cup  Beets                                                                               Cheese, 1 ounce  Broccoli                                                                          Egg, 1  Cabbage                                                                          Peanut butter, 2 tbs.  Carrots                                                                            [place], water packed 1/4 cup  Cauliflower                                                                     Tuna, water packed, 1/4 cup  Celery  Cucumber  Green beans      MEATS                                                                       FATS  Beef, 1 ounce                                                               [place], 1 slice  Chicken, no skin, 1 ounce                                            Cream cheese, 1 Tbs  Fish, 1 ounce                                                                Nuts, 1 Tbs  Ham, 1 ounce                                                               Salad dressing, 1 Tbs  Pork, 1 ounce                                                               Sour cream, 2 Tbs  Seafood, 1 ounce      FREE FOODS  Bouillon and broth  Diet sodas  Coffee  Tea  Sugar-free gelatin desserts  Sugar-free popsicles   [Recent Change In Weight] : ~T recent weight change [Negative] : Heme/Lymph [Fatigue] : no fatigue [Chest Pain] : no chest pain [SOB on Exertion] : no shortness of breath during exertion [FreeTextEntry2] : gained a little weight, working full time again

## 2023-08-01 ENCOUNTER — APPOINTMENT (OUTPATIENT)
Dept: CT IMAGING | Facility: CLINIC | Age: 73
End: 2023-08-01

## 2023-08-01 ENCOUNTER — OUTPATIENT (OUTPATIENT)
Dept: OUTPATIENT SERVICES | Facility: HOSPITAL | Age: 73
LOS: 1 days | End: 2023-08-01
Payer: COMMERCIAL

## 2023-08-01 DIAGNOSIS — C34.11 MALIGNANT NEOPLASM OF UPPER LOBE, RIGHT BRONCHUS OR LUNG: ICD-10-CM

## 2023-08-01 DIAGNOSIS — Z98.890 OTHER SPECIFIED POSTPROCEDURAL STATES: Chronic | ICD-10-CM

## 2023-08-01 PROCEDURE — 71260 CT THORAX DX C+: CPT

## 2023-08-01 PROCEDURE — 74177 CT ABD & PELVIS W/CONTRAST: CPT | Mod: 26

## 2023-08-01 PROCEDURE — 71260 CT THORAX DX C+: CPT | Mod: 26

## 2023-08-01 PROCEDURE — 74177 CT ABD & PELVIS W/CONTRAST: CPT

## 2023-08-07 ENCOUNTER — RX RENEWAL (OUTPATIENT)
Age: 73
End: 2023-08-07

## 2023-08-07 ENCOUNTER — OUTPATIENT (OUTPATIENT)
Dept: OUTPATIENT SERVICES | Facility: HOSPITAL | Age: 73
LOS: 1 days | Discharge: HOME | End: 2023-08-07

## 2023-08-07 DIAGNOSIS — Z98.890 OTHER SPECIFIED POSTPROCEDURAL STATES: Chronic | ICD-10-CM

## 2023-08-07 DIAGNOSIS — C34.11 MALIGNANT NEOPLASM OF UPPER LOBE, RIGHT BRONCHUS OR LUNG: ICD-10-CM

## 2023-08-07 RX ORDER — ROSUVASTATIN CALCIUM 5 MG/1
5 TABLET, FILM COATED ORAL DAILY
Qty: 30 | Refills: 5 | Status: ACTIVE | COMMUNITY
Start: 2021-02-08 | End: 1900-01-01

## 2023-08-15 ENCOUNTER — APPOINTMENT (OUTPATIENT)
Dept: HEMATOLOGY ONCOLOGY | Facility: CLINIC | Age: 73
End: 2023-08-15
Payer: COMMERCIAL

## 2023-08-15 VITALS
WEIGHT: 121.23 LBS | BODY MASS INDEX: 17.4 KG/M2 | TEMPERATURE: 97.9 F | SYSTOLIC BLOOD PRESSURE: 123 MMHG | RESPIRATION RATE: 16 BRPM | DIASTOLIC BLOOD PRESSURE: 75 MMHG | HEART RATE: 85 BPM | OXYGEN SATURATION: 99 %

## 2023-08-15 DIAGNOSIS — C34.11 MALIGNANT NEOPLASM OF UPPER LOBE, RIGHT BRONCHUS OR LUNG: ICD-10-CM

## 2023-08-15 PROCEDURE — 99214 OFFICE O/P EST MOD 30 MIN: CPT

## 2023-08-15 NOTE — HISTORY OF PRESENT ILLNESS
[Disease: _____________________] : Disease: [unfilled] [M: ___] : M[unfilled] [AJCC Stage: ____] : AJCC Stage: [unfilled] [de-identified] : 71 yo M with tobacco smoking hx and 911 exposure ( being followed at 911 clinic,  was diagnosed with COPD/emphysema (dx soon after 9/11), had screening CT in 2019 which showed a lung mass. Work up at [employer]- solitary mass which was resected (benign-organizing PNA). He was referred to pulm grant a year ago by [facility] program but he made an appointment this year with Dr. Pritchard. He referred him for screening CT in Feb which was done in August and showed right upper lobe 3.7 x 3.8 cm mass with interval increase in size since January 7, 2019 worrisome for neoplasm possibly a primary lung neoplasm. Multiple bilateral smaller nodular opacities are new since January 7, 2019 may represent metastatic disease. Enlarged anterior mediastinal lymph node worrisome for metastatic disease. PET/CT 8/25/2020 IMPRESSION: Since outside PET/CT 1/30/2019: Considerable interval increased size and FDG avidity of a now spiculated right upper lobe mass with central necrosis suspicious for primary lung malignancy. New FDG avid enlarged mediastinal lymph nodes and bilateral pulmonary nodules probably metastatic disease. Indeterminate FDG avid left intraparotid focus, not well evaluated secondary to misregistration artifact, however appears new since prior PET/CT, possibly metastatic. He underwent CT guided bx by Dr. Barraza at [facility] which was suggestive of malignancy. He subsequently underwent bronch and EBUS/bx of level 4 LN which was c/w adeno ca, lung primary. He is still doing well from a symptomatic standpoint. He has lost some weight which he usually does every Summer. He denies fevers, chills, dyspnea, chest pain/tightness, cough, wheeze. Continues to work on a full-time basis as a .  10/27/20: pt has no new complaints. He had Ct scans and MRi of brain interval enlargement of multiple bilateral pulmonary masses and mediastinal adenopathy consistent with progressive disease. CT abdomen and MRI brain revealed no mets.   11/6/2020: Pt presents prior to starting treatment today to review regimen, address any concerns and provide written consent. He offers no new complaints.   11/27/2020: Pt returns for follow up. S/p first cycle. Tolerating well . NOT SMOKING!!!!! Doing well. Had one episode of near syncope (vaso-vagal). Pt states he was very dehydrated. No further episodes since. Pt states he "feels great"  12/18/2020: Pt returns for follow up. Tolerating treatment wo any AE. Only reports mild occasional fatigue. Scheduled for C#3 today  1/8/2021: Pt returns for follow up and discussion of findings on recent imaging. He is feeling well. Appetite good. Occasional fatigue. States he is losing his hair and is very unhappy about it.   1/14/21: Patient came to clinic for follow up today, he is s/p C4 Carbo/Alimta, Keytruda on 1/8/21, he reports worsening fatigue, poor appetite but stable weight, also reports  for past 2-3 days and  today, reports burning sensation feeling in chest that radiates to his throat and  nausea. Denies sob and skin rash. He did not take any BP meds today as his BP was in 100s He also experiences constipation. ECG was done in the office and showed some flip T waves in anterior leads. Plan to send him to [facility] to evaluate.  1/29/21: Pt had a prolonged recovery period after last treatment. Nausea, dysphagia, weight loss, and chest pain. As noted above, pt was hospitalized. No acute etiology was found and he was discharged home., He feels better now. Started eating again. CT scan in the hospital on 1/14 revealed   1. Small right upper lobe pulmonary embolism. No evidence of pulmonary infarct or right heart strain. 2. Right upper lobe pulmonary mass and bilateral pulmonary nodules are unchanged from 01/05/2021. 3. There is extensive wall thickening in the proximal duodenum and within left upper quadrant small bowel loops, compatible with enteritis. Upper abdominal free fluid seen between the duodenum and pancreas and within the left upper quadrant, inferior to the pancreas, may be related to enteritis. Superimposed pancreatitis should be excluded clinically. 4. Free fluid in the right perinephric space, posterior-medial to the right kidney is new from 01/05/2021. Underlying genitourinary infection/pyelonephritis should be excluded clinically.  He has lost a lot of weight since last visit.   2/19/21: Pt reports feeling much better. No ae from last treatment with monotherapy Keytruda. Pt has been seeing GI and cologard colon cancer assay was reported as positive and patient has been scheduled for colonoscopy on 3/19/21. No new complaints.   4/2/21: Pt returns for follow up. He is feeling well. States he had colonoscopy and endoscopy last week and "everything was normal." Tolerating treatment well. Offers no complaints.   4/23/21: Pt returns for follow up and discussion of findings on recent imaging. Offers no complaints. Feeling well. Scheduled for treatment today.   5/11/21: Pt feeling well, doing good on Keytruda. Weight is stable, has no complaints.  6/4/21: pt returns for f/u. Feels well. No irAE but pt describes extremely poor appetite. Losing weight. No other complaints  7/16/21:  Pt here for f/u. He is currently receiving  Keytruda monotherpy today with no irEAs. Pt denies SOB, rash, diarrhea, cough and itching.  He feels well and offers no complaints. Pt weight is stable and he verbalized increase appetite.    8/27/21: Pt returns for follow up. He is feeling well but expresses concern about not being able to gain weight. No other concerns /complaints.  9/17/21:  Patient presented for f/u visit. Patient denies SOB, KENNEDY, diarrhea, rash and other irAEs.  Patient gained 1lb. Patient offers no other complaints and verbalized that he is feeling well.    10/8/21: No complaints.   12/15/21: No complaints. No irAEs  1/28/22: Patient seen in treatment room for follow up. Reports doing well without any complaints. Denies worsening SOB, cough CP, abdominal pain, n/v/d, itching. Reports feeling generally well overall.   3/11/22: Pt has no symptoms to report. He is doing well without any irAEs  5/5/22: Pt seen via tele-visit for follow up. he has no new symptoms. Tolerating Keytruda without any irAEs.   7/15/22: Tolerating well with no AEs. Active and working.   8/26/22: Patient seen today in treatment room for follow up. He is doing well and tolerating Keytruda without AEs  10/29/22: Pt here for follow up. He is doing well with no AEs. Still smoking occasionally, due to peer influence at work.   12/30/2022: Seen today for follow up. Noted to have a minor cough and reports that is getting over a cold (not COVID). Overall feeling well. Continues to work and states he is not smoking any longer.   1/20/2022: Patient seen today in treatment room for follow up. Doing well. Last treatment scheduled for Feb 10th 2023  2/10/23: GEtting over a cold, has some cough. Tested for COVID at home, was negative per patient.   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 Pt had LOC for 2 mins or so and had urinary incontinence. His friends called 911 and was evaluated on the scene by EMT and everything was OK per patient. He was asked to go to ER but declined. Pt subsequently called Dr. Luna and my office. We advised pt to go to ER but pt again declined. I hence, sent him for brain MRI which showed chronic microvascular changes, but nothing acute.   4/19/23: Patient seen today for follow up. Of note, he had TTE done that showed grossly preserved EF, and carotid dopplers that did not show hemodynamically significant stenosis. He just saw his cardiologist who does not feel his syncopal episode was cardiac in nature and will follow up with him in one month. Today patient reports that he is feeling terrific. Has not had any additional syncopal episodes, had rehearsal last night where he sang a lot and felt okay. He feels that his syncopal episode was likely related to dehydration and has been drinking much more water. His breathing is stable, still gets short of breath with exertion, does not have any pain. He has returned to work as his wife now has cancer and he needs medical coverage.   5/23/23: Patient seen today for follow up. He reports that he is overall feeling well, no changes in breathing or cough. 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 Feels that the combination of dancing with singing precipitated the event. He notes that he does not drink water. Spoke to his cardiologist after the event and had a check up and cardiologist feels it was due to dehydration. He continues to work as a . His wife has breast cancer and just underwent radiation.   8/15/23: Patient seen today for follow up. He continues off of keytruda and is doing overall well. Has gained some weight. Has not had any additional fainting episodes and continues to sing. He has been drinking more fluids, including electrolyte enhanced beverages, and has been able to perform outdoors and in the heat without recurrence of dizziness. His breathing is stable, no wheezing or coughing, denies chest pain.  [de-identified] : adeno ca [de-identified] : PDL1 80% [FreeTextEntry1] : Maintenance Keytruda completed in February 2023

## 2023-08-15 NOTE — REVIEW OF SYSTEMS
[SOB on Exertion] : shortness of breath during exertion [Negative] : Heme/Lymph [de-identified] : see above [Fever] : no fever [Fatigue] : no fatigue [Chest Pain] : no chest pain [Shortness Of Breath] : no shortness of breath [Cough] : no cough [Abdominal Pain] : no abdominal pain [Vomiting] : no vomiting [Joint Pain] : no joint pain [Muscle Pain] : no muscle pain [Dizziness] : no dizziness [Fainting] : no fainting

## 2023-08-15 NOTE — ASSESSMENT
[Palliative] : Goals of care discussed with patient: Palliative [FreeTextEntry1] : 73 yo with newly diagnosed NSCLC stage IV . PDL1 was 80% and tumor NGS showed high TMB. Blood NGS through [facility] revealed- HRAS and TP53 mutations. Pt started with carbo/alimta/pembro as front line option on 11/6/2020. Completed 4 cycles wo irAEs.  Now on Keytruda monotherapy as maintenance. Will continue on Keytruda q3w regimen. PET/CT 12/13/21 and April 2022 reviewed in detail personally and showed excellent response. I reviewed the most recent CT done in August with pt- this shows a stable 2.3 cm right upper lobe nodule corresponding to the patient's treated non-small cell lung cancer, as well as an additional stable 0.7 cm left upper lobe nodule and 1.3 cm right hilar lymphadenopathy. CT scans from November 2022 show stable treated RUL neoplasm, stable 1.2cm right hilar node and 7mm left upper lobe nodule. CT scan done in Jan 2023 shows stable findings except for a micronodule, in the TI- could be inflammatory since pt started having URI/LRI symptoms immediately after this. Completed 2 years of maintenance Febuary 10th 2023. Follow up scans in May 2023 showed resolution of previously seen 3mm subpleural TI nodule, right lung findings stable. Scans from August 2023 are overall stable.   Continue to monitor off treatment Will be due for scans in November 2023, orders entered  CAD: Recently saw cardiologist Dr. Luna who requested troponin, reviewed all these labs and noted to be OK.  Syncopal episode: Resolved and have not recurred. MRI brain did not show acute abnormalities. Had TTE and carotid dopplers, followed with cardiology and thought to be non cardiac in nature. Pre-syncope recurred likely due to dehydration and over-exertion. Discussed adequate hydration with patient which has helped  Explained that patient should call our office and/or go to the ER should symptoms recur  Re-emphasized ongoing tobacco cessation.  Labs to be done at next visit as per patients request.  OV in 3 months after repeat scans

## 2023-08-27 NOTE — ED CDU PROVIDER INITIAL DAY NOTE - PRO INTERPRETER NEED 2
[facility] Medicine  History & Physical    Patient Name: Barbara Rizo  MRN: [id number]  Patient Class: IP- Inpatient  Admission Date: 8/26/2023  Attending Physician: Bony Dalton MD   Primary Care Provider: Paras Oro MD         Patient information was obtained from patient and ER records.     Subjective:     Principal Problem:Hyponatremia    Chief Complaint:   Chief Complaint   Patient presents with    Shortness of Breath     Patient come sin with increased SOB for several days, normally wears oxygen at nighty, states turned round and fell today, did not hit her head, is on blood thinners H/O blood thinners.         HPI: This is an 84-year-old female with a past medical history of HFpEF (EF: 65%, GIIDD), AFib (on Eliquis), hypertension, type 2 diabetes, hyperlipidemia, CKD 4, breast cancer, who presents with shortness of breath.    Patient presents with shortness of breath and generalized weakness that started 2 days prior to presentation.  She reports that she is been feeling progressively more fatigued and sustained a fall on her back with injury to her head a day prior to presentation.  Additionally, she reports decreased p.o. intake, nausea but denied vomiting or diarrhea.  She usually drinks 48 oz of water daily.     In the ED, the patient was tachypneic and hypoxic, requiring 5 L O2.  Labs were remarkable for hyponatremia (119 > 119), hyperkalemia (5.3 > 5.2), hypochloremia (89), elevated creatinine (2.2 from a baseline of 1.9), elevated BNP (868).  CT head showed no acute process.  CT chest without contrast showed small left pleural effusion with near complete consolidation of the left lower lobe (atelectasis and/or consolidation), and cardiomegaly with small pericardial effusion.  Patient was given Lasix 40 mg IV, ceftriaxone 1 g IV, and was later given 1 L of NS.  Of note, the patient has a history of hyponatremia requiring hospitalization (2020), and a prior history  of a large right pleural effusion requiring a thoracentesis (2015).  Patient was admitted for further management.      Past Medical History:   Diagnosis Date    A-fib     Breast cancer     invasive ductal carcinoma    CKD (chronic kidney disease)     Diabetes mellitus type II     Fatty liver     GERD (gastroesophageal reflux disease)     Hearing loss of both ears     wears bilateral hearing aids    Hemochromatosis     History of anemia due to CKD     History of pleural effusion     with thoracentesis    Hyperlipidemia     Hypertension     Macular degeneration     Osteoarthritis     Left knee    Osteoporosis     Vaginal delivery     x2    Vitamin D deficiency disease     Wears partial dentures     upper removable bridge       Past Surgical History:   Procedure Laterality Date    AXILLARY NODE DISSECTION Left 10/18/2021    Procedure: LYMPHADENECTOMY, AXILLARY;  Surgeon: Darlene Roca MD;  Location: [facility] OR;  Service: General;  Laterality: Left;    BREAST BIOPSY      BREAST LUMPECTOMY      invasive ductal carcinoma    BREAST SURGERY Bilateral     Reduction r/t h/o fibrocystic disease    CHOLECYSTECTOMY  08/2015    EYE SURGERY      Cat Ext  OU    HYSTERECTOMY      partial due to uterine fibroids    INCISION AND DRAINAGE OF KNEE Left 09/17/2020    Procedure: INCISION AND DRAINAGE, KNEE;  Surgeon: Rolando Wagoner MD;  Location: [facility] OR;  Service: Orthopedics;  Laterality: Left;    INJECTION FOR SENTINEL NODE IDENTIFICATION Left 10/18/2021    Procedure: INJECTION, FOR SENTINEL NODE IDENTIFICATION;  Surgeon: Darlene Roca MD;  Location: [facility] OR;  Service: General;  Laterality: Left;    JOINT REPLACEMENT Left 05/13/2013    TKR    JOINT REPLACEMENT Right 08/03/2015    TKR    MASTECTOMY, PARTIAL Left 10/18/2021    Procedure: MASTECTOMY, PARTIAL -- wire;  Surgeon: Darlene Roca MD;  Location: [facility] OR;  Service: General;  Laterality: Left;  RN PREOP 10/15/2021   VACCINATED-----NEED H/P AND  ORDERS    SENTINEL LYMPH NODE BIOPSY Left 10/18/2021    Procedure: BIOPSY, LYMPH NODE, SENTINEL;  Surgeon: Darlene Roca MD;  Location: [facility] OR;  Service: General;  Laterality: Left;    THORACENTESIS      TOTAL KNEE ARTHROPLASTY Right 2015    left knee done 5/2013    WOUND DRESSING Left 09/17/2020    Procedure: WOUND VAC APPLICATION;  Surgeon: Rolando Wagoner MD;  Location: [facility] OR;  Service: Orthopedics;  Laterality: Left;       Review of patient's allergies indicates:  No Known Allergies    Current Facility-Administered Medications on File Prior to Encounter   Medication    denosumab (PROLIA) injection 60 mg     Current Outpatient Medications on File Prior to Encounter   Medication Sig    allopurinoL (ZYLOPRIM) 100 MG tablet Take 1 tablet by mouth once daily.    amiodarone (PACERONE) 200 MG Tab TAKE ONE-HALF TABLET BY MOUTH EVERY DAY *DO NOT TAKE WITH GRAPEFRUIT JUICE*    amLODIPine (NORVASC) 10 MG tablet TAKE ONE TABLET BY MOUTH ONCE DAILY    apixaban (ELIQUIS) 2.5 mg Tab TAKE ONE TABLET BY MOUTH TWICE DAILY    atorvastatin (LIPITOR) 40 MG tablet TAKE ONE TABLET BY MOUTH ONCE DAILY    calcium carbonate (TUMS) 200 mg calcium (500 mg) chewable tablet Take 2 tablets by mouth.    ergocalciferol (ERGOCALCIFEROL) 50,000 unit Cap Take 50,000 Units by mouth every 30 days.     FLUZONE HIGHDOSE QUAD 21-22  mcg/0.7 mL Syrg     folic acid (FOLVITE) 1 MG tablet TAKE 1 TABLET BY MOUTH EVERY DAY    furosemide (LASIX) 20 MG tablet Take 1 tablet (20 mg total) by mouth 2 (two) times daily.    losartan (COZAAR) 25 MG tablet Take 25 mg by mouth once daily.    mupirocin (BACTROBAN) 2 % ointment Apply topically every evening.    solifenacin (VESICARE) 10 MG tablet Take 1 tablet (10 mg total) by mouth once daily.     Family History       Problem Relation (Age of Onset)    Aneurysm Father    Cancer Mother    Hypertension Mother    No Known Problems Sister, Sister, Brother, Daughter, Son          Tobacco Use     Smoking status: Former     Passive exposure: Past    Smokeless tobacco: Never   Substance and Sexual Activity    Alcohol use: Not Currently    Drug use: No    Sexual activity: Not Currently     Review of Systems   Constitutional:  Positive for appetite change and fatigue.   HENT: Negative.     Eyes: Negative.    Respiratory: Negative.     Cardiovascular: Negative.    Gastrointestinal:  Positive for nausea.   Endocrine: Negative.    Genitourinary: Negative.    Musculoskeletal: Negative.    Skin: Negative.    Allergic/Immunologic: Negative.    Neurological: Negative.    Psychiatric/Behavioral: Negative.       Objective:     Vital Signs (Most Recent):  Temp: 98.7 °F (37.1 °C) (08/26/23 2145)  Pulse: 83 (08/27/23 0200)  Resp: (!) 25 (08/26/23 2145)  BP: 110/66 (08/27/23 0200)  SpO2: 95 % (08/27/23 0200) Vital Signs (24h Range):  Temp:  [98.7 °F (37.1 °C)] 98.7 °F (37.1 °C)  Pulse:  [82-85] 83  Resp:  [20-27] 25  SpO2:  [82 %-100 %] 95 %  BP: (108-137)/(63-83) 110/66     Weight: 79.8 kg (176 lb)  Body mass index is 32.19 kg/m².     Physical Exam  Vitals and nursing note reviewed.   Constitutional:       General: She is not in acute distress.     Appearance: Normal appearance. She is not ill-appearing.   HENT:      Head: Normocephalic and atraumatic.      Nose: Nose normal.      Mouth/Throat:      Mouth: Mucous membranes are moist.   Eyes:      Extraocular Movements: Extraocular movements intact.   Cardiovascular:      Rate and Rhythm: Normal rate.      Pulses: Normal pulses.      Heart sounds: No murmur heard.  Pulmonary:      Effort: Pulmonary effort is normal. No respiratory distress.      Breath sounds: Decreased air movement present.   Abdominal:      General: Abdomen is flat.      Palpations: Abdomen is soft.      Tenderness: There is no abdominal tenderness.   Musculoskeletal:      Right lower leg: Edema (trace) present.      Left lower leg: Edema (trace) present.   Skin:     General: Skin is warm.       Capillary Refill: Capillary refill takes less than 2 seconds.   Neurological:      General: No focal deficit present.      Mental Status: She is alert.   Psychiatric:         Mood and Affect: Mood normal.                Significant Labs: All pertinent labs within the past 24 hours have been reviewed.    Significant Imaging: I have reviewed all pertinent imaging results/findings within the past 24 hours.    Assessment/Plan:     * Hyponatremia  Patient has hyponatremia which is uncontrolled,We will aim to correct the sodium by 4-6mEq in 24 hours. We will monitor sodium Every 4 hours. The hyponatremia is due to Dehydration/hypovolemia and/or SIADH secondary to pneumonia. We will obtain the following studies: Urine sodium, urine osmolality, serum osmolality. We will treat the hyponatremia with IV fluids as follows: 100 ml/hr, fluid restriction.The patient's sodium results have been reviewed and are listed below.  Consult nephrology  Recent Labs   Lab 08/27/23  0127   *       Prolonged Q-T interval on ECG  Noted. Avoid QT prolonging agents.       Pleural effusion  Continue antibiotics  Needs outpatient follow up/imaging to ensure resolution      Community acquired pneumonia  CT chest without contrast showed small left pleural effusion with near complete consolidation of the left lower lobe (atelectasis and/or consolidation), and cardiomegaly with small pericardial effusion  Continue ceftriaxone, doxycycline   Respiratory cultures, if able  Urine Legionella antigen    Chronic kidney disease, stage 4 (severe)  Slightly above baseline.  Continue IV fluids   Continue to monitor  Nephrology consult      Type 2 diabetes mellitus with stage 4 chronic kidney disease, without long-term current use of insulin  Patient's FSGs are controlled on current medication regimen.  Last A1c reviewed-   Lab Results   Component Value Date    HGBA1C 5.5 10/05/2022     Monitor BMPs    Hyperkalemia  Medical management.      Chronic  gout  Resume home medication      Acute respiratory failure with hypoxia  Patient with Hypoxic Respiratory failure which is Acute.  she is not on home oxygen. Supplemental oxygen was provided and noted-    Likely in the setting of pneumonia. Pulmonary edema is a consideration.     Essential hypertension  Resume home medications.      Hyperlipidemia  Resume statin      VTE Risk Mitigation (From admission, onward)         Ordered     apixaban tablet 2.5 mg  2 times daily         08/27/23 0246     IP VTE HIGH RISK PATIENT  Once         08/27/23 0246     Place sequential compression device  Until discontinued         08/27/23 0246                 Shahab Taveras MD  Department of Hospital Medicine  [facility] - Emergency Dept   English

## 2023-10-16 ENCOUNTER — RESULT REVIEW (OUTPATIENT)
Age: 73
End: 2023-10-16

## 2023-10-18 ENCOUNTER — APPOINTMENT (OUTPATIENT)
Dept: OTHER | Facility: CLINIC | Age: 73
End: 2023-10-18
Payer: COMMERCIAL

## 2023-10-18 DIAGNOSIS — Z04.9 ENCOUNTER FOR EXAMINATION AND OBSERVATION FOR UNSPECIFIED REASON: ICD-10-CM

## 2023-10-18 DIAGNOSIS — C34.90 MALIGNANT NEOPLASM OF UNSPECIFIED PART OF UNSPECIFIED BRONCHUS OR LUNG: ICD-10-CM

## 2023-10-18 PROCEDURE — 99214 OFFICE O/P EST MOD 30 MIN: CPT | Mod: 95,25

## 2023-10-18 PROCEDURE — 99397 PER PM REEVAL EST PAT 65+ YR: CPT | Mod: 95

## 2023-10-18 RX ORDER — ALBUTEROL SULFATE 90 UG/1
108 (90 BASE) INHALANT RESPIRATORY (INHALATION)
Qty: 1 | Refills: 2 | Status: ACTIVE | COMMUNITY
Start: 2021-09-17 | End: 1900-01-01

## 2023-11-01 ENCOUNTER — APPOINTMENT (OUTPATIENT)
Dept: CT IMAGING | Facility: CLINIC | Age: 73
End: 2023-11-01
Payer: COMMERCIAL

## 2023-11-01 ENCOUNTER — OUTPATIENT (OUTPATIENT)
Dept: OUTPATIENT SERVICES | Facility: HOSPITAL | Age: 73
LOS: 1 days | End: 2023-11-01
Payer: COMMERCIAL

## 2023-11-01 DIAGNOSIS — C34.11 MALIGNANT NEOPLASM OF UPPER LOBE, RIGHT BRONCHUS OR LUNG: ICD-10-CM

## 2023-11-01 DIAGNOSIS — Z98.890 OTHER SPECIFIED POSTPROCEDURAL STATES: Chronic | ICD-10-CM

## 2023-11-01 PROCEDURE — 74177 CT ABD & PELVIS W/CONTRAST: CPT | Mod: 26

## 2023-11-01 PROCEDURE — 71260 CT THORAX DX C+: CPT | Mod: 26

## 2023-11-01 PROCEDURE — 71260 CT THORAX DX C+: CPT

## 2023-11-01 PROCEDURE — 74177 CT ABD & PELVIS W/CONTRAST: CPT

## 2023-11-03 ENCOUNTER — OUTPATIENT (OUTPATIENT)
Dept: OUTPATIENT SERVICES | Facility: HOSPITAL | Age: 73
LOS: 1 days | Discharge: HOME | End: 2023-11-03

## 2023-11-03 DIAGNOSIS — C34.11 MALIGNANT NEOPLASM OF UPPER LOBE, RIGHT BRONCHUS OR LUNG: ICD-10-CM

## 2023-11-03 DIAGNOSIS — Z98.890 OTHER SPECIFIED POSTPROCEDURAL STATES: Chronic | ICD-10-CM

## 2023-11-06 ENCOUNTER — APPOINTMENT (OUTPATIENT)
Dept: PULMONOLOGY | Facility: CLINIC | Age: 73
End: 2023-11-06
Payer: COMMERCIAL

## 2023-11-06 VITALS
HEART RATE: 83 BPM | HEIGHT: 70 IN | WEIGHT: 121 LBS | TEMPERATURE: 97.7 F | BODY MASS INDEX: 17.32 KG/M2 | SYSTOLIC BLOOD PRESSURE: 124 MMHG | OXYGEN SATURATION: 97 % | DIASTOLIC BLOOD PRESSURE: 75 MMHG | RESPIRATION RATE: 16 BRPM

## 2023-11-06 DIAGNOSIS — J44.9 CHRONIC OBSTRUCTIVE PULMONARY DISEASE, UNSPECIFIED: ICD-10-CM

## 2023-11-06 PROCEDURE — 99214 OFFICE O/P EST MOD 30 MIN: CPT

## 2023-11-06 RX ORDER — FLUTICASONE FUROATE, UMECLIDINIUM BROMIDE AND VILANTEROL TRIFENATATE 100; 62.5; 25 UG/1; UG/1; UG/1
100-62.5-25 POWDER RESPIRATORY (INHALATION) DAILY
Qty: 1 | Refills: 11 | Status: ACTIVE | COMMUNITY
Start: 2020-02-21 | End: 1900-01-01

## 2023-11-14 ENCOUNTER — APPOINTMENT (OUTPATIENT)
Dept: HEMATOLOGY ONCOLOGY | Facility: CLINIC | Age: 73
End: 2023-11-14
Payer: COMMERCIAL

## 2023-11-14 VITALS
OXYGEN SATURATION: 95 % | RESPIRATION RATE: 16 BRPM | DIASTOLIC BLOOD PRESSURE: 88 MMHG | BODY MASS INDEX: 17.84 KG/M2 | TEMPERATURE: 97.4 F | WEIGHT: 124.34 LBS | HEART RATE: 95 BPM | SYSTOLIC BLOOD PRESSURE: 133 MMHG

## 2023-11-14 PROCEDURE — 99214 OFFICE O/P EST MOD 30 MIN: CPT

## 2023-12-31 NOTE — PHYSICAL EXAM
NO detected allergy to amoxicillin  or penicillin.  Ok to do the challenge at home.  [Well Developed] : well developed [No Acute Distress] : no acute distress [Normal Conjunctiva] : normal conjunctiva [No Xanthelasma] : no xanthelasma [Normal Venous Pressure] : normal venous pressure [No Carotid Bruit] : no carotid bruit [Normal S1, S2] : normal S1, S2 [No Murmur] : no murmur [No Rub] : no rub [Clear Lung Fields] : clear lung fields [Good Air Entry] : good air entry [No Respiratory Distress] : no respiratory distress  [Soft] : abdomen soft [Non Tender] : non-tender [Normal Gait] : normal gait [Gait - Sufficient for Exercise Testing] : gait - sufficient for exercise testing [No Edema] : no edema [No Cyanosis] : no cyanosis [No Rash] : no rash [No Skin Lesions] : no skin lesions [Moves all extremities] : moves all extremities [No Focal Deficits] : no focal deficits [Normal Speech] : normal speech [Alert and Oriented] : alert and oriented [de-identified] : clubbing

## 2024-01-31 ENCOUNTER — OUTPATIENT (OUTPATIENT)
Dept: OUTPATIENT SERVICES | Facility: HOSPITAL | Age: 74
LOS: 1 days | Discharge: HOME | End: 2024-01-31

## 2024-01-31 DIAGNOSIS — Z98.890 OTHER SPECIFIED POSTPROCEDURAL STATES: Chronic | ICD-10-CM

## 2024-01-31 DIAGNOSIS — C34.11 MALIGNANT NEOPLASM OF UPPER LOBE, RIGHT BRONCHUS OR LUNG: ICD-10-CM

## 2024-02-07 ENCOUNTER — RESULT REVIEW (OUTPATIENT)
Age: 74
End: 2024-02-07

## 2024-02-12 ENCOUNTER — OUTPATIENT (OUTPATIENT)
Dept: OUTPATIENT SERVICES | Facility: HOSPITAL | Age: 74
LOS: 1 days | End: 2024-02-12
Payer: COMMERCIAL

## 2024-02-12 ENCOUNTER — APPOINTMENT (OUTPATIENT)
Dept: CT IMAGING | Facility: CLINIC | Age: 74
End: 2024-02-12
Payer: COMMERCIAL

## 2024-02-12 DIAGNOSIS — Z98.890 OTHER SPECIFIED POSTPROCEDURAL STATES: Chronic | ICD-10-CM

## 2024-02-12 DIAGNOSIS — C34.11 MALIGNANT NEOPLASM OF UPPER LOBE, RIGHT BRONCHUS OR LUNG: ICD-10-CM

## 2024-02-12 PROCEDURE — 71260 CT THORAX DX C+: CPT | Mod: 26

## 2024-02-12 PROCEDURE — 71260 CT THORAX DX C+: CPT

## 2024-02-12 PROCEDURE — 74177 CT ABD & PELVIS W/CONTRAST: CPT

## 2024-02-12 PROCEDURE — 74177 CT ABD & PELVIS W/CONTRAST: CPT | Mod: 26

## 2024-02-14 ENCOUNTER — RESULT REVIEW (OUTPATIENT)
Age: 74
End: 2024-02-14

## 2024-02-14 ENCOUNTER — APPOINTMENT (OUTPATIENT)
Dept: HEMATOLOGY ONCOLOGY | Facility: CLINIC | Age: 74
End: 2024-02-14
Payer: COMMERCIAL

## 2024-02-14 VITALS
SYSTOLIC BLOOD PRESSURE: 133 MMHG | HEART RATE: 87 BPM | RESPIRATION RATE: 16 BRPM | DIASTOLIC BLOOD PRESSURE: 83 MMHG | OXYGEN SATURATION: 96 % | TEMPERATURE: 97.9 F | BODY MASS INDEX: 18.16 KG/M2 | WEIGHT: 126.52 LBS

## 2024-02-14 LAB
ALBUMIN SERPL ELPH-MCNC: 4.8 G/DL
ALP BLD-CCNC: 105 U/L
ALT SERPL-CCNC: 18 U/L
ANION GAP SERPL CALC-SCNC: 13 MMOL/L
AST SERPL-CCNC: 28 U/L
BASOPHILS # BLD AUTO: 0.07 K/UL — SIGNIFICANT CHANGE UP (ref 0–0.2)
BASOPHILS NFR BLD AUTO: 1 % — SIGNIFICANT CHANGE UP (ref 0–2)
BILIRUB SERPL-MCNC: 0.4 MG/DL
BUN SERPL-MCNC: 20 MG/DL
CALCIUM SERPL-MCNC: 10 MG/DL
CEA SERPL-MCNC: 1.6 NG/ML
CHLORIDE SERPL-SCNC: 104 MMOL/L
CO2 SERPL-SCNC: 24 MMOL/L
CREAT SERPL-MCNC: 1.14 MG/DL
EGFR: 69 ML/MIN/1.73M2
EOSINOPHIL # BLD AUTO: 0.34 K/UL — SIGNIFICANT CHANGE UP (ref 0–0.5)
EOSINOPHIL NFR BLD AUTO: 5 % — SIGNIFICANT CHANGE UP (ref 0–6)
GLUCOSE SERPL-MCNC: 102 MG/DL
HCT VFR BLD CALC: 45.5 % — SIGNIFICANT CHANGE UP (ref 39–50)
HGB BLD-MCNC: 15 G/DL — SIGNIFICANT CHANGE UP (ref 13–17)
IMM GRANULOCYTES NFR BLD AUTO: 0.3 % — SIGNIFICANT CHANGE UP (ref 0–0.9)
LDH SERPL-CCNC: 203 U/L
LYMPHOCYTES # BLD AUTO: 1.39 K/UL — SIGNIFICANT CHANGE UP (ref 1–3.3)
LYMPHOCYTES # BLD AUTO: 20.4 % — SIGNIFICANT CHANGE UP (ref 13–44)
MAGNESIUM SERPL-MCNC: 2.1 MG/DL
MCHC RBC-ENTMCNC: 30.7 PG — SIGNIFICANT CHANGE UP (ref 27–34)
MCHC RBC-ENTMCNC: 33 G/DL — SIGNIFICANT CHANGE UP (ref 32–36)
MCV RBC AUTO: 93.2 FL — SIGNIFICANT CHANGE UP (ref 80–100)
MONOCYTES # BLD AUTO: 0.51 K/UL — SIGNIFICANT CHANGE UP (ref 0–0.9)
MONOCYTES NFR BLD AUTO: 7.5 % — SIGNIFICANT CHANGE UP (ref 2–14)
NEUTROPHILS # BLD AUTO: 4.48 K/UL — SIGNIFICANT CHANGE UP (ref 1.8–7.4)
NEUTROPHILS NFR BLD AUTO: 65.8 % — SIGNIFICANT CHANGE UP (ref 43–77)
NRBC # BLD: 0 /100 WBCS — SIGNIFICANT CHANGE UP (ref 0–0)
PLATELET # BLD AUTO: 254 K/UL — SIGNIFICANT CHANGE UP (ref 150–400)
POTASSIUM SERPL-SCNC: 5 MMOL/L
PROT SERPL-MCNC: 7.6 G/DL
RBC # BLD: 4.88 M/UL — SIGNIFICANT CHANGE UP (ref 4.2–5.8)
RBC # FLD: 13.1 % — SIGNIFICANT CHANGE UP (ref 10.3–14.5)
SODIUM SERPL-SCNC: 141 MMOL/L
WBC # BLD: 6.81 K/UL — SIGNIFICANT CHANGE UP (ref 3.8–10.5)
WBC # FLD AUTO: 6.81 K/UL — SIGNIFICANT CHANGE UP (ref 3.8–10.5)

## 2024-02-14 PROCEDURE — 99214 OFFICE O/P EST MOD 30 MIN: CPT

## 2024-02-14 NOTE — ASSESSMENT
[Palliative] : Goals of care discussed with patient: Palliative [FreeTextEntry1] : Malignant neoplasm of upper lobe, right bronchus or lung (162.3) (C34.11) 72 yo with diagnosed NSCLC stage IV. PDL1 was 80% and tumor NGS showed high TMB. Blood NGS through [facility] revealed- HRAS and TP53 mutations. Pt started with carbo/alimta/pembro as front line option on 11/6/2020. Completed 4 cycles wo irAEs and 2 years maintenance therapy with Keytruda. [facility]   Continue to monitor off treatment Reviewed imaging from 2/12/24. From lung perspective completely stable. New 1cm inferior L. renal nodule. Recommend MRI to further characterize. Order placed Labs today including tumor markers and CMP Pt continues not to smoke!   CAD/Syncopal episode: Syncopal episode: Has not recurred. MRI brain did not show acute abnormalities. Had TTE and carotid dopplers, followed with cardiology and thought to be non cardiac in nature. Pre-syncope recurred likely due to dehydration and over-exertion.   OV following MRI abdomen

## 2024-02-14 NOTE — REVIEW OF SYSTEMS
[SOB on Exertion] : shortness of breath during exertion [Dizziness] : dizziness [Negative] : Heme/Lymph [Fever] : no fever [Fatigue] : no fatigue [Chest Pain] : no chest pain [Shortness Of Breath] : no shortness of breath [Cough] : no cough [Abdominal Pain] : no abdominal pain [Vomiting] : no vomiting [Joint Pain] : no joint pain [Muscle Pain] : no muscle pain [de-identified] : see above

## 2024-02-14 NOTE — HISTORY OF PRESENT ILLNESS
[Disease: _____________________] : Disease: [unfilled] [M: ___] : M[unfilled] [AJCC Stage: ____] : AJCC Stage: [unfilled] [de-identified] : 71 yo M with tobacco smoking hx and 911 exposure ( being followed at 911 clinic,  was diagnosed with COPD/emphysema (dx soon after 9/11), had screening CT in 2019 which showed a lung mass. Work up at [facility]- solitary mass which was resected (benign-organizing PNA). He was referred to pulm grant a year ago by [facility] program but he made an appointment this year with Dr. Pritchard. He referred him for screening CT in Feb which was done in August and showed right upper lobe 3.7 x 3.8 cm mass with interval increase in size since January 7, 2019 worrisome for neoplasm possibly a primary lung neoplasm. Multiple bilateral smaller nodular opacities are new since January 7, 2019 may represent metastatic disease. Enlarged anterior mediastinal lymph node worrisome for metastatic disease. PET/CT 8/25/2020 IMPRESSION: Since outside PET/CT 1/30/2019: Considerable interval increased size and FDG avidity of a now spiculated right upper lobe mass with central necrosis suspicious for primary lung malignancy. New FDG avid enlarged mediastinal lymph nodes and bilateral pulmonary nodules probably metastatic disease. Indeterminate FDG avid left intraparotid focus, not well evaluated secondary to misregistration artifact, however appears new since prior PET/CT, possibly metastatic. He underwent CT guided bx by Dr. Barraza at [facility] which was suggestive of malignancy. He subsequently underwent bronch and EBUS/bx of level 4 LN which was c/w adeno ca, lung primary. He is still doing well from a symptomatic standpoint. He has lost some weight which he usually does every Summer. He denies fevers, chills, dyspnea, chest pain/tightness, cough, wheeze. Continues to work on a full-time basis as a .  10/27/20: pt has no new complaints. He had Ct scans and MRi of brain interval enlargement of multiple bilateral pulmonary masses and mediastinal adenopathy consistent with progressive disease. CT abdomen and MRI brain revealed no mets.   11/6/2020: Pt presents prior to starting treatment today to review regimen, address any concerns and provide written consent. He offers no new complaints.   11/27/2020: Pt returns for follow up. S/p first cycle. Tolerating well . NOT SMOKING!!!!! Doing well. Had one episode of near syncope (vaso-vagal). Pt states he was very dehydrated. No further episodes since. Pt states he "feels great"  12/18/2020: Pt returns for follow up. Tolerating treatment wo any AE. Only reports mild occasional fatigue. Scheduled for C#3 today  1/8/2021: Pt returns for follow up and discussion of findings on recent imaging. He is feeling well. Appetite good. Occasional fatigue. States he is losing his hair and is very unhappy about it.   1/14/21: Patient came to clinic for follow up today, he is s/p C4 Carbo/Alimta, Keytruda on 1/8/21, he reports worsening fatigue, poor appetite but stable weight, also reports  for past 2-3 days and  today, reports burning sensation feeling in chest that radiates to his throat and  nausea. Denies sob and skin rash. He did not take any BP meds today as his BP was in 100s He also experiences constipation. ECG was done in the office and showed some flip T waves in anterior leads. Plan to send him to [facility] to evaluate.  1/29/21: Pt had a prolonged recovery period after last treatment. Nausea, dysphagia, weight loss, and chest pain. As noted above, pt was hospitalized. No acute etiology was found and he was discharged home., He feels better now. Started eating again. CT scan in the hospital on 1/14 revealed   1. Small right upper lobe pulmonary embolism. No evidence of pulmonary infarct or right heart strain. 2. Right upper lobe pulmonary mass and bilateral pulmonary nodules are unchanged from 01/05/2021. 3. There is extensive wall thickening in the proximal duodenum and within left upper quadrant small bowel loops, compatible with enteritis. Upper abdominal free fluid seen between the duodenum and pancreas and within the left upper quadrant, inferior to the pancreas, may be related to enteritis. Superimposed pancreatitis should be excluded clinically. 4. Free fluid in the right perinephric space, posterior-medial to the right kidney is new from 01/05/2021. Underlying genitourinary infection/pyelonephritis should be excluded clinically.  He has lost a lot of weight since last visit.   2/19/21: Pt reports feeling much better. No ae from last treatment with monotherapy Keytruda. Pt has been seeing GI and cologard colon cancer assay was reported as positive and patient has been scheduled for colonoscopy on 3/19/21. No new complaints.   4/2/21: Pt returns for follow up. He is feeling well. States he had colonoscopy and endoscopy last week and "everything was normal." Tolerating treatment well. Offers no complaints.   4/23/21: Pt returns for follow up and discussion of findings on recent imaging. Offers no complaints. Feeling well. Scheduled for treatment today.   5/11/21: Pt feeling well, doing good on Keytruda. Weight is stable, has no complaints.  6/4/21: pt returns for f/u. Feels well. No irAE but pt describes extremely poor appetite. Losing weight. No other complaints  7/16/21:  Pt here for f/u. He is currently receiving  Keytruda monotherpy today with no irEAs. Pt denies SOB, rash, diarrhea, cough and itching.  He feels well and offers no complaints. Pt weight is stable and he verbalized increase appetite.    8/27/21: Pt returns for follow up. He is feeling well but expresses concern about not being able to gain weight. No other concerns /complaints.  9/17/21:  Patient presented for f/u visit. Patient denies SOB, KENNEDY, diarrhea, rash and other irAEs.  Patient gained 1lb. Patient offers no other complaints and verbalized that he is feeling well.    10/8/21: No complaints.   12/15/21: No complaints. No irAEs  1/28/22: Patient seen in treatment room for follow up. Reports doing well without any complaints. Denies worsening SOB, cough CP, abdominal pain, n/v/d, itching. Reports feeling generally well overall.   3/11/22: Pt has no symptoms to report. He is doing well without any irAEs  5/5/22: Pt seen via tele-visit for follow up. he has no new symptoms. Tolerating Keytruda without any irAEs.   7/15/22: Tolerating well with no AEs. Active and working.   8/26/22: Patient seen today in treatment room for follow up. He is doing well and tolerating Keytruda without AEs  10/29/22: Pt here for follow up. He is doing well with no AEs. Still smoking occasionally, due to peer influence at work.   12/30/2022: Seen today for follow up. Noted to have a minor cough and reports that is getting over a cold (not COVID). Overall feeling well. Continues to work and states he is not smoking any longer.   1/20/2022: Patient seen today in treatment room for follow up. Doing well. Last treatment scheduled for Feb 10th 2023  2/10/23: GEtting over a cold, has some cough. Tested for COVID at home, was negative per patient.   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 Pt had LOC for 2 mins or so and had urinary incontinence. His friends called 911 and was evaluated on the scene by EMT and everything was OK per patient. He was asked to go to ER but declined. Pt subsequently called Dr. Luna and my office. We advised pt to go to ER but pt again declined. I hence, sent him for brain MRI which showed chronic microvascular changes, but nothing acute.   4/19/23: Patient seen today for follow up. Of note, he had TTE done that showed grossly preserved EF, and carotid dopplers that did not show hemodynamically significant stenosis. He just saw his cardiologist who does not feel his syncopal episode was cardiac in nature and will follow up with him in one month. Today patient reports that he is feeling terrific. Has not had any additional syncopal episodes, had rehearsal last night where he sang a lot and felt okay. He feels that his syncopal episode was likely related to dehydration and has been drinking much more water. His breathing is stable, still gets short of breath with exertion, does not have any pain. He has returned to work as his wife now has cancer and he needs medical coverage.   5/23/23: Patient seen today for follow up. He reports that he is overall feeling well, no changes in breathing or cough. 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 Feels that the combination of dancing with singing precipitated the event. He notes that he does not drink water. Spoke to his cardiologist after the event and had a check up and cardiologist feels it was due to dehydration. He continues to work as a . His wife has breast cancer and just underwent radiation.   11/14/23: Pt returns for follow up. He is feeling well. Reports had mild URI recently. Feeling mostly recovered. Pt has stated that he is refusing FLU/COVID vaccines. No new complaints. Completed follow up imaging last week. Eager to discuss findings. Pt continues to perform on [employer] and continues to work as .   2/14/24: Pt returns for follow up and discussion of findings on recent imaging. He is feeling well. Offers no new complaints. Continues working as  and performing with [employer].  [de-identified] : adeno ca [de-identified] : PDL1 80% [FreeTextEntry1] : Maintenance Keytruda completed in February 2023

## 2024-02-19 ENCOUNTER — APPOINTMENT (OUTPATIENT)
Dept: MRI IMAGING | Facility: CLINIC | Age: 74
End: 2024-02-19
Payer: COMMERCIAL

## 2024-02-19 ENCOUNTER — OUTPATIENT (OUTPATIENT)
Dept: OUTPATIENT SERVICES | Facility: HOSPITAL | Age: 74
LOS: 1 days | End: 2024-02-19
Payer: COMMERCIAL

## 2024-02-19 DIAGNOSIS — C34.90 MALIGNANT NEOPLASM OF UNSPECIFIED PART OF UNSPECIFIED BRONCHUS OR LUNG: ICD-10-CM

## 2024-02-19 PROCEDURE — 74183 MRI ABD W/O CNTR FLWD CNTR: CPT

## 2024-02-19 PROCEDURE — A9585: CPT

## 2024-02-19 PROCEDURE — 74183 MRI ABD W/O CNTR FLWD CNTR: CPT | Mod: 26

## 2024-03-04 ENCOUNTER — RX RENEWAL (OUTPATIENT)
Age: 74
End: 2024-03-04

## 2024-03-04 RX ORDER — AMLODIPINE BESYLATE 10 MG/1
10 TABLET ORAL DAILY
Qty: 30 | Refills: 11 | Status: ACTIVE | COMMUNITY
Start: 2020-09-04 | End: 1900-01-01

## 2024-04-04 NOTE — H&P PST ADULT - ENERGY EXPENDITURE (METS)
>3 FB   Neck ROM: full  Mouth opening: > = 3 FB   Dental:    (+) poor dentition      Pulmonary:   (+) pneumonia (Oxygen saturation 93%): unresolved,  COPD (Oxygen dependent - 6L NC): no interval change and severe,  shortness of breath: no interval change,    rhonchi:bilateral decreased breath sounds: bilateral        (-) sleep apnea and not a current smoker (>90 pyhx. - quit 2016)                          ROS comment: Chronic respiratory failure with hypoxia  Bullous emphysema  Pulmonary fibrosis  Pneumonia of left lower lobe due to infectious organism  Pulmonary Mycobacterium avium complex (MAC) infection       Cardiovascular:  Exercise tolerance: poor (<4 METS)  (+) CHF (HFpEF):, TABOR: no interval change, pulmonary hypertension: no interval change    (-) past MI, CAD, CABG/stent, dysrhythmias and  angina    ECG reviewed  Rhythm: regular  Rate: normal  Echocardiogram reviewed         Beta Blocker:  Not on Beta Blocker      ROS comment: EKG:  Normal sinus rhythm  Normal ECG  When compared with ECG of 28-MAR-2024 12:10,  Criteria for Anterior infarct are no longer present  T wave inversion no longer evident in Anterior leads    ECHO:   Left ventricle grossly normal in size.   Normal LV segmental wall motion.   Normal left ventricular wall thickness.   Estimated left ventricular ejection fraction is 62±5%.   Estimated wedge pressure is 14 mmHg as per [place] formula.   Does not meet 50% diagnostic criteria for diastolic dysfunction.   Mildly dilated right ventricle.   TAPSE is normal   Physiologic and/or trace mitral regurgitation is present.   Physiologic and/or trace tricuspid regurgitation.   Unable to accurately assess RV systolic pressure.   Physiologic and/or trace pulmonic regurgitation present.   Technically good quality study.   Compared to prior echo, no significant changes noted.   Suggest clinical correlation.         Neuro/Psych:      (-) seizures, TIA and CVA            ROS comment: Chronic low back  6

## 2024-05-03 ENCOUNTER — APPOINTMENT (OUTPATIENT)
Dept: CARDIOLOGY | Facility: CLINIC | Age: 74
End: 2024-05-03
Payer: COMMERCIAL

## 2024-05-03 VITALS
WEIGHT: 127.87 LBS | OXYGEN SATURATION: 98 % | BODY MASS INDEX: 18.31 KG/M2 | HEIGHT: 70 IN | DIASTOLIC BLOOD PRESSURE: 73 MMHG | HEART RATE: 73 BPM | SYSTOLIC BLOOD PRESSURE: 128 MMHG

## 2024-05-03 DIAGNOSIS — I10 ESSENTIAL (PRIMARY) HYPERTENSION: ICD-10-CM

## 2024-05-03 DIAGNOSIS — I34.0 NONRHEUMATIC MITRAL (VALVE) INSUFFICIENCY: ICD-10-CM

## 2024-05-03 DIAGNOSIS — I25.10 ATHEROSCLEROTIC HEART DISEASE OF NATIVE CORONARY ARTERY W/OUT ANGINA PECTORIS: ICD-10-CM

## 2024-05-03 PROCEDURE — 93000 ELECTROCARDIOGRAM COMPLETE: CPT

## 2024-05-03 PROCEDURE — 99406 BEHAV CHNG SMOKING 3-10 MIN: CPT

## 2024-05-03 PROCEDURE — 93010 ELECTROCARDIOGRAM REPORT: CPT

## 2024-05-03 PROCEDURE — 99215 OFFICE O/P EST HI 40 MIN: CPT | Mod: 25

## 2024-05-03 RX ORDER — ASPIRIN ENTERIC COATED TABLETS 81 MG 81 MG/1
81 TABLET, DELAYED RELEASE ORAL DAILY
Refills: 0 | Status: ACTIVE | COMMUNITY
Start: 2022-08-26

## 2024-05-03 RX ORDER — AZITHROMYCIN 250 MG/1
250 TABLET, FILM COATED ORAL
Qty: 6 | Refills: 0 | Status: COMPLETED | COMMUNITY
Start: 2023-02-10 | End: 2024-05-03

## 2024-05-03 NOTE — PHYSICAL EXAM
[No Acute Distress] : no acute distress [Normal Conjunctiva] : normal conjunctiva [No Xanthelasma] : no xanthelasma [Normal Venous Pressure] : normal venous pressure [Normal S1, S2] : normal S1, S2 [Clear Lung Fields] : clear lung fields [Good Air Entry] : good air entry [No Respiratory Distress] : no respiratory distress  [Soft] : abdomen soft [Normal Gait] : normal gait [No Edema] : no edema [No Cyanosis] : no cyanosis [No Varicosities] : no varicosities [No Rash] : no rash [Moves all extremities] : moves all extremities [No Focal Deficits] : no focal deficits [Normal Speech] : normal speech [Alert and Oriented] : alert and oriented [de-identified] : no orthostatic hypotension [Well Developed] : well developed [Well Nourished] : well nourished [No Murmur] : no murmur [No Gallop] : no gallop [de-identified] : digital clubbing

## 2024-05-03 NOTE — REVIEW OF SYSTEMS
[FreeTextEntry1] : I personally reviewed, interpreted and discussed patient's MRI images. No CPA mass.\par I personally reviewed, interpreted and discussed patient's BMP results.\par \par Patient has not felt improvement in hearing.\par \par Advised to continue steroids since she is tolerating the side effects. \par \par RTC next week for repeat audio. Possible need for transtympanic injections if no improvement. [Negative] : Heme/Lymph [Feeling Fatigued] : feeling fatigued

## 2024-05-03 NOTE — REASON FOR VISIT
[FreeTextEntry3] : Dr. Blel [FreeTextEntry1] : ----------------------------------------------------------------------- JAIME MCDONALD is a 74 year old man with WTC exposure (), smoker, hypertension, metastatic lung adenoCA s/p carbo/pemetrexed and pembrolizumab (11/2020-2/2023), pulmonary embolism (Jan 2021), and coronary artery calcifications by CT seen for follow up of cardiovascular risk factors and atherosclerosis.

## 2024-05-03 NOTE — HISTORY OF PRESENT ILLNESS
[FreeTextEntry1] : Interval History: He returns and reports feeling very well. He continues to work as a . He reports not having chest pain, palpitations, dyspnea on exertion, syncope, or claudication. He does complain of fatigue - however recent shift changes at work affected his sleep schedule.  Smokes 5-7 cigarettes per week (gets from colleagues at work - does not purchase packets for his own use).  History: At oncology visit 2021 reported chest discomfort and labile blood pressure with hypertension at home, noted to have new TWI on his ECG and sent to ER for workup. In ER Hs Annika was 27-25-21 ng/L (negative), BNP 5716 (elevated). ECG RBBB, chronic. CT-Angiogram showed new segmental PE, started on anticoagulation with enoxaparin and transitioned to apixaban.  Also noted on the CT were coronary artery calcification and signs of colitis. He was prescribed a course of cipro/flagyl for this by the ER, which he feels did nothing. He denies any chest pain or new shortness of breath. He denies any dizziness, palpitations, syncope, or near syncope. He had mild esophagitis from PD-1 inhibitor initially, which resolved.  He denies any history of heart disease but has right bundle branch block on the ECG, which is chronic. Work as a  causes him moderate stress, and he smoked until cancer diagnosis.  He tells me he takes amlodipine for HTN, but is no longer taking HCTZ or losartan. No recent stress test.  Compliant with apixaban, notes some ecchymoses.  May 2021:  He feels well and denies any new cardiac symptoms or complaints. He is taking medications as directed without any notable adverse effects. Continues on immunotherapy with PD-1 inhibitor, every 3 weeks, tolerating well. He smoked 1 or 2 cigarettes in the past month and has some second hand smoke exposure.  No new chest pain, palps, dizziness/lightheadedness.  Sept 10 2021 Feels well. Continues on pembrolizumab plan for total 24 months. Good disease response to date. Still smoking, but less than 5 per week. No  chest pain, no palps, no change in ET. No bleeding. Dyspnea on exertion is stable.   2022 Interval History: Feels great. Tells me he stopped smoking. Continue to experience dyspnea with exertion, relieved with nebulizers. Symptoms are stable. Has been taking apixaban only once a day.  2023: Feels well. No palpitations. No new complaints. Tolerating maintenance immunotherapy and there are planned cycles remaining. His wife was diagnosed with breast cancer, so he returned to work to retain insurance benefits for her treatment. Medications reviewed and confirmed. Echo from July with no change from prior, but suboptimal study and GLS not performed.  2023: Feels well. No recurrent syncope. He had brain MRI and no mass or acute intracranial pathology identified. He denies chest pain. He continues working as a . He completed immunotherapy and post treatment troponin T and pro-BNP were within normal limits. Recent lipid panel reviewed. An echocardiogram was also performed, normal LV function and mild-moderate MR.   Cardiovascular Testing: ------------------------------------------------ EC/3/2024: sinus w PACs and RBBB 2023: sinus with RBBB, no change from prior 2022: sinus w RBBB, no change from prior 2022: sinus with RBBB, unchanged from prior 9/10/2021: sinus rhythm 73 bpm, RBBB. Unchanged from prior 5/10/2021: sinus rhythm at 86 bpm with RBBB, unchanged from prior  -------------------------------------------------- Echo: 3/31/2023: normal EF, mild-moderate MR with MAC, mild-moderate TR (technically difficult) 2022: mild MR, mild LV dysfunction, no pericardial effusion 2021: technically difficult, MAC, mild MR. Mild LV dysfunction. No pericardial effusion  --------------------------------------------------- CT 2023: R CFA dilated with calcified and non-calcified plaque, L CFA w calcified/non-calcified plaque : CT chest, abdomen with IV contrast: no aortic aneurysm and moderate proximal SMA stenosis

## 2024-05-03 NOTE — ASSESSMENT
[FreeTextEntry1] : ------------------------------------ 74 year old man with lung adenocarcinoma with ROHINI s/p chemo and maintenance PD-1 inhibitor immunotherapy, HTN, atherosclerotic plaque, and history of pulmonary embolism.  He tolerated immunotherapy with minimal if any side effects. We discussed that late effects of immunotherapy are unknown, though given accelerated atherogenesis an aggressive approach to ASCVD risk reduction is prudent.  # Atherosclerosis: bilateral common femoral and SMA disease on his CT scans, history of smoking. - Continue rosuvastatin 5 mg daily - continue aspirin 81 mg daily for now - Lipid panel 1/20/2023: LDL 89, , Non-, HDL 57 - A1c 5.5 % - importance of not smoking and avoiding second hand smoke emphasized - patient declined repeat lipids today due to need to get back to work, says will have these done at Med/Onc follow up - lipid/A1c ordered - Exercise DANIELLA to screen for PAD given HTN, Smoking  # Immunotherapy - troponin T 15 ng/L (normal) - pro-BNP: 364 pg/mL (1/2023), improved from Jan 2021 (5716)  # Mild-moderate MR by echo - asymptomatic. 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 No recurrence and appears noncardiogenic at present. He notes increased attention to staying well hydrated and I advised he continue this. 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 - no recurrent symptoms  Follow up in one year with me.  Above recommendations discussed and all questions were answered to the best of my ability and to his apparent satisfaction.

## 2024-06-05 ENCOUNTER — OUTPATIENT (OUTPATIENT)
Dept: OUTPATIENT SERVICES | Facility: HOSPITAL | Age: 74
LOS: 1 days | End: 2024-06-05
Payer: COMMERCIAL

## 2024-06-05 ENCOUNTER — RESULT REVIEW (OUTPATIENT)
Age: 74
End: 2024-06-05

## 2024-06-05 ENCOUNTER — APPOINTMENT (OUTPATIENT)
Dept: CV DIAGNOSITCS | Facility: HOSPITAL | Age: 74
End: 2024-06-05

## 2024-06-05 DIAGNOSIS — I34.0 NONRHEUMATIC MITRAL (VALVE) INSUFFICIENCY: ICD-10-CM

## 2024-06-05 DIAGNOSIS — Z98.890 OTHER SPECIFIED POSTPROCEDURAL STATES: Chronic | ICD-10-CM

## 2024-06-05 DIAGNOSIS — R93.1 ABNORMAL FINDINGS ON DIAGNOSTIC IMAGING OF HEART AND CORONARY CIRCULATION: ICD-10-CM

## 2024-06-05 PROCEDURE — 93306 TTE W/DOPPLER COMPLETE: CPT | Mod: 26

## 2024-06-14 ENCOUNTER — RESULT REVIEW (OUTPATIENT)
Age: 74
End: 2024-06-14

## 2024-06-22 ENCOUNTER — OUTPATIENT (OUTPATIENT)
Dept: OUTPATIENT SERVICES | Facility: HOSPITAL | Age: 74
LOS: 1 days | End: 2024-06-22
Payer: COMMERCIAL

## 2024-06-22 ENCOUNTER — APPOINTMENT (OUTPATIENT)
Dept: CT IMAGING | Facility: CLINIC | Age: 74
End: 2024-06-22
Payer: COMMERCIAL

## 2024-06-22 DIAGNOSIS — Z98.890 OTHER SPECIFIED POSTPROCEDURAL STATES: Chronic | ICD-10-CM

## 2024-06-22 DIAGNOSIS — C34.90 MALIGNANT NEOPLASM OF UNSPECIFIED PART OF UNSPECIFIED BRONCHUS OR LUNG: ICD-10-CM

## 2024-06-22 PROCEDURE — 71250 CT THORAX DX C-: CPT | Mod: 26

## 2024-06-22 PROCEDURE — 74176 CT ABD & PELVIS W/O CONTRAST: CPT

## 2024-06-22 PROCEDURE — 71250 CT THORAX DX C-: CPT

## 2024-06-22 PROCEDURE — 74176 CT ABD & PELVIS W/O CONTRAST: CPT | Mod: 26

## 2024-06-26 DIAGNOSIS — Z12.9 ENCOUNTER FOR SCREENING FOR MALIGNANT NEOPLASM, SITE UNSPECIFIED: ICD-10-CM

## 2024-06-28 ENCOUNTER — APPOINTMENT (OUTPATIENT)
Dept: HEMATOLOGY ONCOLOGY | Facility: CLINIC | Age: 74
End: 2024-06-28

## 2024-06-28 VITALS
OXYGEN SATURATION: 96 % | BODY MASS INDEX: 18.04 KG/M2 | RESPIRATION RATE: 16 BRPM | WEIGHT: 126 LBS | SYSTOLIC BLOOD PRESSURE: 148 MMHG | HEIGHT: 70 IN | TEMPERATURE: 97.9 F | HEART RATE: 76 BPM | DIASTOLIC BLOOD PRESSURE: 91 MMHG

## 2024-06-28 PROCEDURE — G2211 COMPLEX E/M VISIT ADD ON: CPT

## 2024-06-28 PROCEDURE — 99213 OFFICE O/P EST LOW 20 MIN: CPT

## 2024-07-17 NOTE — H&P PST ADULT - TOBACCO USE
Brief Cardiology Update:    Pericardial effusion with tamponade:   Emergent pericardiocentesis with drain performed -  7/16/24 - 500ml removed.   Since pericardiocentesis - 35ml drain output over night   Limited echocardiogram today demonstrates no obvious pericardial effusion. Discussed the result with Dr Teran who recommends removing the drain.      Pericardial drain removed today 7/17/2024 without obvious issues. Patient tolerated well.   Pressure held over the site for 10 minutes.  The site is dressed with the sterile gauze and a transparent dressing.        Former smoker

## 2024-07-23 ENCOUNTER — NON-APPOINTMENT (OUTPATIENT)
Age: 74
End: 2024-07-23

## 2024-08-07 ENCOUNTER — APPOINTMENT (OUTPATIENT)
Dept: DERMATOLOGY | Facility: CLINIC | Age: 74
End: 2024-08-07

## 2024-08-07 PROBLEM — Z12.83 SCREENING EXAM FOR SKIN CANCER: Status: ACTIVE | Noted: 2020-03-11

## 2024-08-07 PROBLEM — L82.1 SEBORRHEIC KERATOSIS: Status: ACTIVE | Noted: 2024-08-07

## 2024-08-07 PROBLEM — Z87.891 FORMER SMOKER: Status: ACTIVE | Noted: 2024-08-07

## 2024-08-07 PROBLEM — D22.9 MULTIPLE BENIGN MELANOCYTIC NEVI: Status: ACTIVE | Noted: 2024-08-07

## 2024-08-07 PROCEDURE — 99203 OFFICE O/P NEW LOW 30 MIN: CPT | Mod: 25

## 2024-08-07 PROCEDURE — 17004 DESTROY PREMAL LESIONS 15/>: CPT

## 2024-08-07 NOTE — ASSESSMENT
[FreeTextEntry1] : #AKs The risks/benefits/alternatives of cryo-destruction was explained to the patient which, include but are not limited to redness, swelling, pain, blistering, scar, discoloration of skin, and recurrence. The patient expressed understanding of these risks and agreed to the procedure. 15 lesions treated with 2 cycles of LN2. The procedure was well tolerated, without complication. We have discussed related skin care.  #SK - discussed benign nature; no therapy indicated at this time - Cryo gratis, SED as above  # Multiple melanocytic nevi, all benign appearing on today's exam -Sun protection was discussed. The proper use of broad spectrum UVB/UVA sunscreen with SPF 30 or greater was reviewed and the need for re-application after swimming or sweating or 2-3 hours was emphasized. We discussed judicious use of clothing and avoidance of peak periods of sun exposure. I made the patient aware of need for year-round protection. ABCDEs of melanoma reviewed. -Self-skin check also reviewed -Counseled pt to monitor for changes   # Screening for skin cancer -ABCDEs of melanoma, sun safety, and self-skin check reviewed -Counseled pt to notify us of any changing or concerning lesions -RTC 12 months, sooner prn

## 2024-08-07 NOTE — PHYSICAL EXAM
[FreeTextEntry3] :  AAOx3, NAD, well-appearing / pleasant Total body skin exam performed within normal limits with the exception of:  - Patient deferred examination of genitalia  R shoulder with stuck on brown plaque Several pink gritty papules face Fairly uniform and regular brown macules and papules on the trunk and extremities

## 2024-08-07 NOTE — HISTORY OF PRESENT ILLNESS
[FreeTextEntry1] : NP skin check [de-identified] : NP (last seen over 3 years ago) Hx of AKs Here for skin check One spot on R shoulder he is concerned about

## 2024-12-09 ENCOUNTER — RX RENEWAL (OUTPATIENT)
Age: 74
End: 2024-12-09

## 2024-12-18 ENCOUNTER — OUTPATIENT (OUTPATIENT)
Dept: OUTPATIENT SERVICES | Facility: HOSPITAL | Age: 74
LOS: 1 days | Discharge: HOME | End: 2024-12-18

## 2024-12-18 DIAGNOSIS — Z98.890 OTHER SPECIFIED POSTPROCEDURAL STATES: Chronic | ICD-10-CM

## 2024-12-18 DIAGNOSIS — C34.11 MALIGNANT NEOPLASM OF UPPER LOBE, RIGHT BRONCHUS OR LUNG: ICD-10-CM

## 2024-12-19 ENCOUNTER — RESULT REVIEW (OUTPATIENT)
Age: 74
End: 2024-12-19

## 2024-12-19 ENCOUNTER — APPOINTMENT (OUTPATIENT)
Dept: HEMATOLOGY ONCOLOGY | Facility: CLINIC | Age: 74
End: 2024-12-19
Payer: COMMERCIAL

## 2024-12-19 VITALS
HEART RATE: 82 BPM | SYSTOLIC BLOOD PRESSURE: 129 MMHG | RESPIRATION RATE: 16 BRPM | WEIGHT: 124.32 LBS | TEMPERATURE: 97.3 F | OXYGEN SATURATION: 96 % | DIASTOLIC BLOOD PRESSURE: 84 MMHG | BODY MASS INDEX: 17.84 KG/M2

## 2024-12-19 DIAGNOSIS — Z51.12 ENCOUNTER FOR ANTINEOPLASTIC IMMUNOTHERAPY: ICD-10-CM

## 2024-12-19 DIAGNOSIS — C34.11 MALIGNANT NEOPLASM OF UPPER LOBE, RIGHT BRONCHUS OR LUNG: ICD-10-CM

## 2024-12-19 LAB
BASOPHILS # BLD AUTO: 0.08 K/UL — SIGNIFICANT CHANGE UP (ref 0–0.2)
BASOPHILS NFR BLD AUTO: 1.1 % — SIGNIFICANT CHANGE UP (ref 0–2)
EOSINOPHIL # BLD AUTO: 0.2 K/UL — SIGNIFICANT CHANGE UP (ref 0–0.5)
EOSINOPHIL NFR BLD AUTO: 2.9 % — SIGNIFICANT CHANGE UP (ref 0–6)
HCT VFR BLD CALC: 45.5 % — SIGNIFICANT CHANGE UP (ref 39–50)
HGB BLD-MCNC: 15 G/DL — SIGNIFICANT CHANGE UP (ref 13–17)
IMM GRANULOCYTES NFR BLD AUTO: 0.3 % — SIGNIFICANT CHANGE UP (ref 0–0.9)
LYMPHOCYTES # BLD AUTO: 1.31 K/UL — SIGNIFICANT CHANGE UP (ref 1–3.3)
LYMPHOCYTES # BLD AUTO: 18.8 % — SIGNIFICANT CHANGE UP (ref 13–44)
MCHC RBC-ENTMCNC: 31.6 PG — SIGNIFICANT CHANGE UP (ref 27–34)
MCHC RBC-ENTMCNC: 33 G/DL — SIGNIFICANT CHANGE UP (ref 32–36)
MCV RBC AUTO: 96 FL — SIGNIFICANT CHANGE UP (ref 80–100)
MONOCYTES # BLD AUTO: 0.62 K/UL — SIGNIFICANT CHANGE UP (ref 0–0.9)
MONOCYTES NFR BLD AUTO: 8.9 % — SIGNIFICANT CHANGE UP (ref 2–14)
NEUTROPHILS # BLD AUTO: 4.73 K/UL — SIGNIFICANT CHANGE UP (ref 1.8–7.4)
NEUTROPHILS NFR BLD AUTO: 68 % — SIGNIFICANT CHANGE UP (ref 43–77)
NRBC # BLD: 0 /100 WBCS — SIGNIFICANT CHANGE UP (ref 0–0)
NRBC BLD-RTO: 0 /100 WBCS — SIGNIFICANT CHANGE UP (ref 0–0)
PLATELET # BLD AUTO: 239 K/UL — SIGNIFICANT CHANGE UP (ref 150–400)
RBC # BLD: 4.74 M/UL — SIGNIFICANT CHANGE UP (ref 4.2–5.8)
RBC # FLD: 12.5 % — SIGNIFICANT CHANGE UP (ref 10.3–14.5)
WBC # BLD: 6.96 K/UL — SIGNIFICANT CHANGE UP (ref 3.8–10.5)
WBC # FLD AUTO: 6.96 K/UL — SIGNIFICANT CHANGE UP (ref 3.8–10.5)

## 2024-12-19 PROCEDURE — G2211 COMPLEX E/M VISIT ADD ON: CPT

## 2024-12-19 PROCEDURE — 99214 OFFICE O/P EST MOD 30 MIN: CPT

## 2024-12-20 LAB
ALBUMIN SERPL ELPH-MCNC: 4.7 G/DL
ALP BLD-CCNC: 89 U/L
ALT SERPL-CCNC: 14 U/L
ANION GAP SERPL CALC-SCNC: 12 MMOL/L
AST SERPL-CCNC: 25 U/L
BILIRUB SERPL-MCNC: 0.6 MG/DL
BUN SERPL-MCNC: 15 MG/DL
CALCIUM SERPL-MCNC: 10 MG/DL
CEA SERPL-MCNC: 1.9 NG/ML
CHLORIDE SERPL-SCNC: 104 MMOL/L
CO2 SERPL-SCNC: 24 MMOL/L
CREAT SERPL-MCNC: 1.1 MG/DL
EGFR: 71 ML/MIN/1.73M2
GLUCOSE SERPL-MCNC: 95 MG/DL
POTASSIUM SERPL-SCNC: 5.2 MMOL/L
PROT SERPL-MCNC: 7.8 G/DL
SODIUM SERPL-SCNC: 140 MMOL/L
TSH SERPL-ACNC: 2.5 UIU/ML

## 2024-12-24 ENCOUNTER — APPOINTMENT (OUTPATIENT)
Dept: PULMONOLOGY | Facility: CLINIC | Age: 74
End: 2024-12-24
Payer: COMMERCIAL

## 2024-12-24 VITALS
SYSTOLIC BLOOD PRESSURE: 137 MMHG | HEIGHT: 70 IN | DIASTOLIC BLOOD PRESSURE: 88 MMHG | BODY MASS INDEX: 18.04 KG/M2 | WEIGHT: 126 LBS | RESPIRATION RATE: 16 BRPM | OXYGEN SATURATION: 97 % | HEART RATE: 83 BPM | TEMPERATURE: 98.6 F

## 2024-12-24 PROCEDURE — 99214 OFFICE O/P EST MOD 30 MIN: CPT

## 2024-12-26 ENCOUNTER — APPOINTMENT (OUTPATIENT)
Dept: OTHER | Facility: CLINIC | Age: 74
End: 2024-12-26
Payer: COMMERCIAL

## 2024-12-26 DIAGNOSIS — C34.90 MALIGNANT NEOPLASM OF UNSPECIFIED PART OF UNSPECIFIED BRONCHUS OR LUNG: ICD-10-CM

## 2024-12-26 DIAGNOSIS — I26.99 OTHER PULMONARY EMBOLISM W/OUT ACUTE COR PULMONALE: ICD-10-CM

## 2024-12-26 DIAGNOSIS — J44.9 CHRONIC OBSTRUCTIVE PULMONARY DISEASE, UNSPECIFIED: ICD-10-CM

## 2024-12-26 PROCEDURE — 99215 OFFICE O/P EST HI 40 MIN: CPT | Mod: 95,25

## 2024-12-26 PROCEDURE — 99397 PER PM REEVAL EST PAT 65+ YR: CPT | Mod: 95

## 2025-01-05 ENCOUNTER — OUTPATIENT (OUTPATIENT)
Dept: OUTPATIENT SERVICES | Facility: HOSPITAL | Age: 75
LOS: 1 days | End: 2025-01-05
Payer: COMMERCIAL

## 2025-01-05 DIAGNOSIS — C34.11 MALIGNANT NEOPLASM OF UPPER LOBE, RIGHT BRONCHUS OR LUNG: ICD-10-CM

## 2025-01-05 DIAGNOSIS — Z98.890 OTHER SPECIFIED POSTPROCEDURAL STATES: Chronic | ICD-10-CM

## 2025-01-05 DIAGNOSIS — Z00.8 ENCOUNTER FOR OTHER GENERAL EXAMINATION: ICD-10-CM

## 2025-01-05 PROCEDURE — A9585: CPT

## 2025-01-05 PROCEDURE — 70553 MRI BRAIN STEM W/O & W/DYE: CPT

## 2025-01-10 ENCOUNTER — OUTPATIENT (OUTPATIENT)
Dept: OUTPATIENT SERVICES | Facility: HOSPITAL | Age: 75
LOS: 1 days | End: 2025-01-10
Payer: COMMERCIAL

## 2025-01-10 ENCOUNTER — APPOINTMENT (OUTPATIENT)
Dept: CT IMAGING | Facility: CLINIC | Age: 75
End: 2025-01-10
Payer: COMMERCIAL

## 2025-01-10 PROCEDURE — 71260 CT THORAX DX C+: CPT

## 2025-01-10 PROCEDURE — 74177 CT ABD & PELVIS W/CONTRAST: CPT | Mod: 26

## 2025-01-10 PROCEDURE — 71260 CT THORAX DX C+: CPT | Mod: 26

## 2025-01-10 PROCEDURE — 74177 CT ABD & PELVIS W/CONTRAST: CPT

## 2025-04-03 ENCOUNTER — RX RENEWAL (OUTPATIENT)
Age: 75
End: 2025-04-03

## 2025-05-05 ENCOUNTER — NON-APPOINTMENT (OUTPATIENT)
Age: 75
End: 2025-05-05

## 2025-05-05 ENCOUNTER — APPOINTMENT (OUTPATIENT)
Dept: OTHER | Facility: CLINIC | Age: 75
End: 2025-05-05
Payer: COMMERCIAL

## 2025-05-05 DIAGNOSIS — J44.9 CHRONIC OBSTRUCTIVE PULMONARY DISEASE, UNSPECIFIED: ICD-10-CM

## 2025-05-05 PROCEDURE — 99213 OFFICE O/P EST LOW 20 MIN: CPT | Mod: 93

## 2025-06-16 ENCOUNTER — RESULT REVIEW (OUTPATIENT)
Age: 75
End: 2025-06-16

## 2025-06-21 ENCOUNTER — OUTPATIENT (OUTPATIENT)
Dept: OUTPATIENT SERVICES | Facility: HOSPITAL | Age: 75
LOS: 1 days | End: 2025-06-21
Payer: COMMERCIAL

## 2025-06-21 ENCOUNTER — APPOINTMENT (OUTPATIENT)
Dept: CT IMAGING | Facility: IMAGING CENTER | Age: 75
End: 2025-06-21

## 2025-06-21 DIAGNOSIS — Z98.890 OTHER SPECIFIED POSTPROCEDURAL STATES: Chronic | ICD-10-CM

## 2025-06-21 DIAGNOSIS — C34.90 MALIGNANT NEOPLASM OF UNSPECIFIED PART OF UNSPECIFIED BRONCHUS OR LUNG: ICD-10-CM

## 2025-06-21 DIAGNOSIS — Z00.8 ENCOUNTER FOR OTHER GENERAL EXAMINATION: ICD-10-CM

## 2025-06-21 PROCEDURE — 71260 CT THORAX DX C+: CPT

## 2025-06-21 PROCEDURE — 74177 CT ABD & PELVIS W/CONTRAST: CPT | Mod: 26

## 2025-06-21 PROCEDURE — 74177 CT ABD & PELVIS W/CONTRAST: CPT

## 2025-06-21 PROCEDURE — 71260 CT THORAX DX C+: CPT | Mod: 26

## 2025-06-24 ENCOUNTER — APPOINTMENT (OUTPATIENT)
Dept: PULMONOLOGY | Facility: CLINIC | Age: 75
End: 2025-06-24
Payer: COMMERCIAL

## 2025-06-24 VITALS
BODY MASS INDEX: 17.47 KG/M2 | SYSTOLIC BLOOD PRESSURE: 120 MMHG | DIASTOLIC BLOOD PRESSURE: 72 MMHG | HEART RATE: 85 BPM | WEIGHT: 122 LBS | OXYGEN SATURATION: 93 % | RESPIRATION RATE: 17 BRPM | HEIGHT: 70 IN

## 2025-06-24 PROCEDURE — 99214 OFFICE O/P EST MOD 30 MIN: CPT

## 2025-06-26 ENCOUNTER — OUTPATIENT (OUTPATIENT)
Dept: OUTPATIENT SERVICES | Facility: HOSPITAL | Age: 75
LOS: 1 days | Discharge: HOME | End: 2025-06-26

## 2025-06-26 ENCOUNTER — APPOINTMENT (OUTPATIENT)
Dept: HEMATOLOGY ONCOLOGY | Facility: CLINIC | Age: 75
End: 2025-06-26
Payer: COMMERCIAL

## 2025-06-26 VITALS
WEIGHT: 122 LBS | BODY MASS INDEX: 17.47 KG/M2 | HEART RATE: 85 BPM | RESPIRATION RATE: 17 BRPM | TEMPERATURE: 97.9 F | OXYGEN SATURATION: 98 % | HEIGHT: 70 IN | DIASTOLIC BLOOD PRESSURE: 78 MMHG | SYSTOLIC BLOOD PRESSURE: 131 MMHG

## 2025-06-26 DIAGNOSIS — Z98.890 OTHER SPECIFIED POSTPROCEDURAL STATES: Chronic | ICD-10-CM

## 2025-06-26 PROCEDURE — 99213 OFFICE O/P EST LOW 20 MIN: CPT

## 2025-06-26 PROCEDURE — G2211 COMPLEX E/M VISIT ADD ON: CPT

## 2025-07-03 RX ORDER — ENSIFENTRINE 3 MG/2.5ML
3 SUSPENSION RESPIRATORY (INHALATION) TWICE DAILY
Qty: 1 | Refills: 11 | Status: ACTIVE | COMMUNITY
Start: 2025-06-24 | End: 1900-01-01

## 2025-07-07 ENCOUNTER — NON-APPOINTMENT (OUTPATIENT)
Age: 75
End: 2025-07-07

## 2025-08-01 ENCOUNTER — APPOINTMENT (OUTPATIENT)
Dept: CARDIOLOGY | Facility: CLINIC | Age: 75
End: 2025-08-01